# Patient Record
Sex: FEMALE | Race: WHITE | NOT HISPANIC OR LATINO | Employment: FULL TIME | URBAN - NONMETROPOLITAN AREA
[De-identification: names, ages, dates, MRNs, and addresses within clinical notes are randomized per-mention and may not be internally consistent; named-entity substitution may affect disease eponyms.]

---

## 2017-04-21 DIAGNOSIS — K76.0 FATTY (CHANGE OF) LIVER, NOT ELSEWHERE CLASSIFIED: ICD-10-CM

## 2017-05-05 ENCOUNTER — TRANSCRIBE ORDERS (OUTPATIENT)
Dept: LAB | Facility: MEDICAL CENTER | Age: 22
End: 2017-05-05

## 2017-05-05 ENCOUNTER — APPOINTMENT (OUTPATIENT)
Dept: LAB | Facility: MEDICAL CENTER | Age: 22
End: 2017-05-05
Payer: COMMERCIAL

## 2017-05-05 ENCOUNTER — TRANSCRIBE ORDERS (OUTPATIENT)
Dept: ADMINISTRATIVE | Facility: HOSPITAL | Age: 22
End: 2017-05-05

## 2017-05-05 DIAGNOSIS — M25.562 LEFT KNEE PAIN, UNSPECIFIED CHRONICITY: Primary | ICD-10-CM

## 2017-05-05 DIAGNOSIS — K76.0 FATTY (CHANGE OF) LIVER, NOT ELSEWHERE CLASSIFIED: ICD-10-CM

## 2017-05-05 LAB
ALBUMIN SERPL BCP-MCNC: 3.6 G/DL (ref 3.5–5)
ALP SERPL-CCNC: 68 U/L (ref 46–116)
ALT SERPL W P-5'-P-CCNC: 24 U/L (ref 12–78)
ANION GAP SERPL CALCULATED.3IONS-SCNC: 8 MMOL/L (ref 4–13)
AST SERPL W P-5'-P-CCNC: 14 U/L (ref 5–45)
BILIRUB SERPL-MCNC: 0.39 MG/DL (ref 0.2–1)
BUN SERPL-MCNC: 10 MG/DL (ref 5–25)
CALCIUM SERPL-MCNC: 9.4 MG/DL (ref 8.3–10.1)
CHLORIDE SERPL-SCNC: 107 MMOL/L (ref 100–108)
CO2 SERPL-SCNC: 28 MMOL/L (ref 21–32)
CREAT SERPL-MCNC: 0.75 MG/DL (ref 0.6–1.3)
GFR SERPL CREATININE-BSD FRML MDRD: >60 ML/MIN/1.73SQ M
GLUCOSE P FAST SERPL-MCNC: 68 MG/DL (ref 65–99)
POTASSIUM SERPL-SCNC: 3.8 MMOL/L (ref 3.5–5.3)
PROT SERPL-MCNC: 8 G/DL (ref 6.4–8.2)
SODIUM SERPL-SCNC: 143 MMOL/L (ref 136–145)

## 2017-05-05 PROCEDURE — 36415 COLL VENOUS BLD VENIPUNCTURE: CPT

## 2017-05-05 PROCEDURE — 80053 COMPREHEN METABOLIC PANEL: CPT

## 2017-05-12 ENCOUNTER — HOSPITAL ENCOUNTER (OUTPATIENT)
Dept: MRI IMAGING | Facility: HOSPITAL | Age: 22
Discharge: HOME | End: 2017-05-12
Payer: COMMERCIAL

## 2017-05-12 DIAGNOSIS — M25.562 LEFT KNEE PAIN, UNSPECIFIED CHRONICITY: ICD-10-CM

## 2017-05-12 PROCEDURE — 73721 MRI JNT OF LWR EXTRE W/O DYE: CPT

## 2017-05-30 ENCOUNTER — OFFICE VISIT (OUTPATIENT)
Dept: URGENT CARE | Facility: CLINIC | Age: 22
End: 2017-05-30
Payer: COMMERCIAL

## 2017-05-30 PROCEDURE — 99214 OFFICE O/P EST MOD 30 MIN: CPT

## 2017-07-05 ENCOUNTER — APPOINTMENT (OUTPATIENT)
Dept: FAMILY MEDICINE CLINIC | Facility: CLINIC | Age: 22
End: 2017-07-05
Payer: COMMERCIAL

## 2017-07-05 PROCEDURE — 86580 TB INTRADERMAL TEST: CPT | Performed by: FAMILY MEDICINE

## 2017-07-19 ENCOUNTER — ALLSCRIPTS OFFICE VISIT (OUTPATIENT)
Dept: FAMILY MEDICINE CLINIC | Facility: CLINIC | Age: 22
End: 2017-07-19
Payer: COMMERCIAL

## 2017-07-19 DIAGNOSIS — R16.0 HEPATOMEGALY, NOT ELSEWHERE CLASSIFIED: ICD-10-CM

## 2017-07-19 PROCEDURE — 99213 OFFICE O/P EST LOW 20 MIN: CPT | Performed by: FAMILY MEDICINE

## 2017-07-21 ENCOUNTER — APPOINTMENT (OUTPATIENT)
Dept: LAB | Facility: MEDICAL CENTER | Age: 22
End: 2017-07-21
Payer: COMMERCIAL

## 2017-07-21 ENCOUNTER — TRANSCRIBE ORDERS (OUTPATIENT)
Dept: LAB | Facility: MEDICAL CENTER | Age: 22
End: 2017-07-21

## 2017-07-21 DIAGNOSIS — R16.0 HEPATOMEGALY, NOT ELSEWHERE CLASSIFIED: ICD-10-CM

## 2017-07-21 LAB
ALBUMIN SERPL BCP-MCNC: 3.7 G/DL (ref 3.5–5)
ALP SERPL-CCNC: 62 U/L (ref 46–116)
ALT SERPL W P-5'-P-CCNC: 23 U/L (ref 12–78)
ANION GAP SERPL CALCULATED.3IONS-SCNC: 8 MMOL/L (ref 4–13)
AST SERPL W P-5'-P-CCNC: 16 U/L (ref 5–45)
BILIRUB SERPL-MCNC: 0.34 MG/DL (ref 0.2–1)
BUN SERPL-MCNC: 7 MG/DL (ref 5–25)
CALCIUM SERPL-MCNC: 9.3 MG/DL (ref 8.3–10.1)
CHLORIDE SERPL-SCNC: 104 MMOL/L (ref 100–108)
CO2 SERPL-SCNC: 26 MMOL/L (ref 21–32)
CREAT SERPL-MCNC: 0.65 MG/DL (ref 0.6–1.3)
GFR SERPL CREATININE-BSD FRML MDRD: >60 ML/MIN/1.73SQ M
GLUCOSE SERPL-MCNC: 71 MG/DL (ref 65–140)
POTASSIUM SERPL-SCNC: 3.7 MMOL/L (ref 3.5–5.3)
PROT SERPL-MCNC: 7.8 G/DL (ref 6.4–8.2)
SODIUM SERPL-SCNC: 138 MMOL/L (ref 136–145)

## 2017-07-21 PROCEDURE — 36415 COLL VENOUS BLD VENIPUNCTURE: CPT

## 2017-07-21 PROCEDURE — 80053 COMPREHEN METABOLIC PANEL: CPT

## 2017-11-09 ENCOUNTER — GENERIC CONVERSION - ENCOUNTER (OUTPATIENT)
Dept: OTHER | Facility: OTHER | Age: 22
End: 2017-11-09

## 2017-11-09 DIAGNOSIS — E16.2 HYPOGLYCEMIA: ICD-10-CM

## 2018-01-10 NOTE — PROGRESS NOTES
Chief Complaint  pt is here today for a a PPD, TdaP, and BLOOD WORK  Review of Systems    Constitutional: negative  ENT: negative  Cardiovascular: negative  Respiratory: negative  Gastrointestinal: negative  Genitourinary: negative  Musculoskeletal: negative  Integumentary and Breasts: negative  Neurological: negative  Current Meds   1  Cetirizine HCl - 10 MG Oral Tablet; take 1 tablet by mouth daily; Therapy: 42CVF8953 to (Evaluate:53Gpj0182)  Requested for: 13Jun2016; Last   Rx:13Jun2016 Ordered    Allergies    1  No Known Drug Allergies    Plan   Health Maintenance    · PPD   · Tdap (Boostrix)    (LC) Hepatitis B Surf Ab Quant; Status:Active - Retrospective By Protocol Authorization; Requested for:48Vtn8895;   Perform:[facility]; Due:61Bth8727; Last Updated By:Geovanny Valle; 7/18/2016 11:54:19 AM;Ordered;    For:Health Maintenance; Ordered By:Naomi Carson;   (1) VARICELLA ZOSTER IMMUNITY(IGG); Status:Active - Retrospective By Protocol Authorization; Requested for:89Cal5688;   Perform:[facility]; Due:25Olo9344; Last Updated [facility]; 7/18/2016 11:54:20 AM;Ordered;    For:Health Maintenance; Ordered By:Naomi Carson;   (1) RUBELLA IMMUNITY; Status:Active - Retrospective By Protocol Authorization; Requested for:96Pom4093;   Perform:[facility]; Due:68Rxu3286; Last Updated [facility]; 7/18/2016 11:54:21 AM;Ordered;    For:Health Maintenance; Ordered By:Naomi Carson;    Health Maintenance (V70 0) (Z00 00)          Signatures   Electronically signed by : Stacy Slade, [facility]; Jul 18 2016  2:11PM EST                       (Author)    Electronically signed by : Zia Villegas MD; Jan 12 2017  1:05PM EST                       (Author)

## 2018-01-13 VITALS — SYSTOLIC BLOOD PRESSURE: 110 MMHG | WEIGHT: 150 LBS | DIASTOLIC BLOOD PRESSURE: 70 MMHG | HEART RATE: 62 BPM

## 2018-01-16 NOTE — PROGRESS NOTES
Chief Complaint  Patient is here for Hep B vaccine  Active Problems    1  Low iron (280 9) (E61 1)   2  Screening for condition (V82 9) (Z13 9)    Current Meds   1  Cetirizine HCl - 10 MG Oral Tablet; take 1 tablet by mouth daily; Therapy: 82IDY7485 to (Evaluate:55Jav1043)  Requested for: 13Jun2016; Last   Rx:13Jun2016 Ordered    Allergies    1  No Known Drug Allergies    Plan  Health Maintenance    · Hepatitis B; INJECT 0 5  ML Intramuscular; To Be Done: 30TKY1595    Signatures   Electronically signed by : LISETTE Carrasco;  Aug  8 2016  8:32AM EST                       (Author)    Electronically signed by : Jocelyn Pena MD; Jan 12 2017  1:05PM EST                       (Author)

## 2018-01-17 NOTE — RESULT NOTES
Verified Results  (1) HEP B SURFACE ANTIBODY 73XRU1374 02:29PM Doyle Dov     Test Name Result Flag Reference   HEPATITIS B SURFACE ANTIBODY 143 30 mIU/mL     Protective Immunity: Hep B Surface Antibody >= 10 mIu/ml (Traceable to [facility] Reference Preparation)

## 2018-01-17 NOTE — PROGRESS NOTES
Chief Complaint  Patient is here for her 2nd ppd and a hemoglobin  Active Problems    1  Screening for condition (V82 9) (Z13 9)    Current Meds   1  Cetirizine HCl - 10 MG Oral Tablet; take 1 tablet by mouth daily; Therapy: 98NNE1546 to (Evaluate:72Wlm6670)  Requested for: 13Jun2016; Last   Rx:13Jun2016 Ordered    Allergies    1  No Known Drug Allergies    Assessment    1   Low iron (280 9) (E61 1)    Plan   PPD; INJECT 0 1  ML Intradermal; Dose: 0 1; Route: Intradermal; Site: Right Forearm; Done: 96VOX9602 08:06AM; Status: Complete - Retrospective By Protocol Authorization;  Ordered  For: Health Maintenance; Ordered By:Jordon Carson; Effective Date:25Jul2016; Administered by: Keturah Nolen: 7/25/2016 8:06:00 AM; Last Updated By: Keturah Nolen; 8/3/2016 3:08:37 PM     Signatures   Electronically signed by : Dorota Gonzalez, [facility]; Jul 25 2016  9:19AM EST                       (Author)    Electronically signed by : Kenisha Hopkins MD; Sep 13 2016  8:15PM EST                       (Author)

## 2018-03-19 ENCOUNTER — OFFICE VISIT (OUTPATIENT)
Dept: URGENT CARE | Facility: CLINIC | Age: 23
End: 2018-03-19
Payer: COMMERCIAL

## 2018-03-19 VITALS
OXYGEN SATURATION: 99 % | TEMPERATURE: 97.5 F | DIASTOLIC BLOOD PRESSURE: 86 MMHG | HEART RATE: 70 BPM | SYSTOLIC BLOOD PRESSURE: 129 MMHG

## 2018-03-19 DIAGNOSIS — J30.9 CHRONIC ALLERGIC RHINITIS, UNSPECIFIED SEASONALITY, UNSPECIFIED TRIGGER: Primary | ICD-10-CM

## 2018-03-19 PROCEDURE — 99213 OFFICE O/P EST LOW 20 MIN: CPT | Performed by: NURSE PRACTITIONER

## 2018-03-19 RX ORDER — FLUTICASONE PROPIONATE 50 MCG
2 SPRAY, SUSPENSION (ML) NASAL DAILY
Qty: 16 G | Refills: 0 | Status: SHIPPED | OUTPATIENT
Start: 2018-03-19 | End: 2018-12-04

## 2018-03-19 RX ORDER — PREDNISONE 20 MG/1
TABLET ORAL
Qty: 15 TABLET | Refills: 0 | Status: SHIPPED | OUTPATIENT
Start: 2018-03-19 | End: 2018-05-24 | Stop reason: ALTCHOICE

## 2018-03-19 NOTE — PATIENT INSTRUCTIONS
Discussed viral vs allergic vs bacterial infection  RX medication as directed  Continue OTC cough/cold medications as directed, take allergy medication daily  Call or return for problems/concerns

## 2018-03-19 NOTE — PROGRESS NOTES
[address] [facility]        NAME: Eduard To is a 25 y o  female  : 1995    MRN: [id number]  DATE: 2018  TIME: 9:11 AM    Assessment and Plan   Chronic allergic rhinitis, unspecified seasonality, unspecified trigger [J30 9]  1  Chronic allergic rhinitis, unspecified seasonality, unspecified trigger  predniSONE 20 mg tablet    fluticasone (FLONASE) 50 mcg/act nasal spray         Patient Instructions     Patient Instructions   Discussed viral vs allergic vs bacterial infection  RX medication as directed  Continue OTC cough/cold medications as directed, take allergy medication daily  Call or return for problems/concerns        Follow up with PCP in 3-5 days  Proceed to  ER if symptoms worsen  Chief Complaint     Chief Complaint   Patient presents with    Earache     and sinus congestion x 3 days         History of Present Illness       3 days of sore throat, sinus Ha, congestion  Has allergies, has not been on her allergy medication  No fevers      Earache    Associated symptoms include coughing, headaches, rhinorrhea and a sore throat  Pertinent negatives include no diarrhea, rash or vomiting  Review of Systems   Review of Systems   Constitutional: Negative for activity change, chills, fatigue and fever  HENT: Positive for congestion, ear pain, postnasal drip, rhinorrhea, sinus pressure and sore throat  Eyes: Negative for pain, discharge and redness  Respiratory: Positive for cough  Negative for wheezing  Cardiovascular: Negative for chest pain  Gastrointestinal: Negative for constipation, diarrhea, nausea and vomiting  Musculoskeletal: Negative for myalgias  Skin: Negative for rash  Neurological: Positive for headaches  Negative for dizziness           Current Medications       Current Outpatient Prescriptions:     fluticasone (FLONASE) 50 mcg/act nasal spray, 2 sprays into each nostril daily, Disp: 16 g, Rfl: 0    predniSONE 20 mg tablet, 20mg PO BID x 5 days then 20mg PO daily x 5 days, Disp: 15 tablet, Rfl: 0    Current Allergies     Allergies as of 03/19/2018 - Reviewed 03/19/2018   Allergen Reaction Noted    Levofloxacin Hives 11/23/2016    Sulfa antibiotics Hives 11/23/2016            The following portions of the patient's history were reviewed and updated as appropriate: allergies, current medications, past family history, past medical history, past social history, past surgical history and problem list      No past medical history on file  No past surgical history on file  No family history on file  Medications have been verified  Objective   /86   Pulse 70   Temp 97 5 °F (36 4 °C)   SpO2 99%        Physical Exam     Physical Exam   Constitutional: She is oriented to person, place, and time  She appears well-developed and well-nourished  No distress  HENT:   Head: Normocephalic and atraumatic  Right Ear: Tympanic membrane, external ear and ear canal normal    Left Ear: Tympanic membrane, external ear and ear canal normal    Nose: Rhinorrhea present  No epistaxis  Right sinus exhibits no maxillary sinus tenderness and no frontal sinus tenderness  Left sinus exhibits no maxillary sinus tenderness and no frontal sinus tenderness  Mouth/Throat: Uvula is midline, oropharynx is clear and moist and mucous membranes are normal    Cardiovascular: Normal rate, regular rhythm and normal heart sounds  Exam reveals no gallop and no friction rub  No murmur heard  Pulmonary/Chest: Effort normal and breath sounds normal  No respiratory distress  She has no wheezes  She has no rales  Abdominal: She exhibits no distension  Musculoskeletal: Normal range of motion  Neurological: She is alert and oriented to person, place, and time  Skin: She is not diaphoretic  Psychiatric: She has a normal mood and affect  Her behavior is normal  Judgment and thought content normal    Nursing note and vitals reviewed

## 2018-05-24 ENCOUNTER — OFFICE VISIT (OUTPATIENT)
Dept: URGENT CARE | Facility: CLINIC | Age: 23
End: 2018-05-24
Payer: COMMERCIAL

## 2018-05-24 VITALS
WEIGHT: 178 LBS | HEIGHT: 67 IN | SYSTOLIC BLOOD PRESSURE: 121 MMHG | RESPIRATION RATE: 18 BRPM | TEMPERATURE: 98.9 F | DIASTOLIC BLOOD PRESSURE: 76 MMHG | HEART RATE: 80 BPM | OXYGEN SATURATION: 99 % | BODY MASS INDEX: 27.94 KG/M2

## 2018-05-24 DIAGNOSIS — J01.00 ACUTE MAXILLARY SINUSITIS, RECURRENCE NOT SPECIFIED: Primary | ICD-10-CM

## 2018-05-24 PROCEDURE — G0382 LEV 3 HOSP TYPE B ED VISIT: HCPCS | Performed by: PHYSICIAN ASSISTANT

## 2018-05-24 PROCEDURE — 99283 EMERGENCY DEPT VISIT LOW MDM: CPT | Performed by: PHYSICIAN ASSISTANT

## 2018-05-24 RX ORDER — AMOXICILLIN AND CLAVULANATE POTASSIUM 875; 125 MG/1; MG/1
1 TABLET, FILM COATED ORAL EVERY 12 HOURS SCHEDULED
Qty: 20 TABLET | Refills: 0 | Status: SHIPPED | OUTPATIENT
Start: 2018-05-24 | End: 2018-06-03

## 2018-05-24 NOTE — PROGRESS NOTES
[address]  (office) [phone]  (fax) [phone]        NAME: Yvetta Hashimoto is a 25 y o  female  : 1995    MRN: [id number]  DATE: May 24, 2018  TIME: 6:07 PM    Assessment and Plan   Acute maxillary sinusitis, recurrence not specified [J01 00]  1  Acute maxillary sinusitis, recurrence not specified  amoxicillin-clavulanate (AUGMENTIN) 875-125 mg per tablet       Patient Instructions   I have prescribed an antibiotic for the infection  Please take the antibiotic as prescribed and finish the entire prescription  I recommend that the patient takes an over the counter probiotic or eats yogurt with live cultures in it [place]) to keep good bacteria in the gut and help prevent diarrhea  Wash hands frequently to prevent the spread of infection  Can use over the counter cough and cold medications to help with symptoms  Ibuprofen and/or tylenol as needed for pain or fever  If not improving over the next 3-5 days, follow up with PCP  To present to the ER if symptoms worsen  Chief Complaint     Chief Complaint   Patient presents with    Sore Throat     x 5 days  Achilles Fu LPN    Earache     Bilateral ear pain    Cold Like Symptoms         History of Present Illness   Yvetta Hashimoto presents to the clinic c/o    Sore Throat    This is a new problem  The current episode started in the past 7 days  The problem has been gradually worsening  Neither side of throat is experiencing more pain than the other  There has been no fever  The pain is moderate  Associated symptoms include ear pain  Pertinent negatives include no abdominal pain, congestion, coughing, diarrhea, ear discharge, headaches, neck pain, shortness of breath, swollen glands, trouble swallowing or vomiting  She has tried nothing for the symptoms  The treatment provided no relief  Earache    There is pain in both ears  This is a new problem   The current episode started in the past 7 days  The problem occurs constantly  The problem has been unchanged  There has been no fever  Pertinent negatives include no abdominal pain, coughing, diarrhea, ear discharge, headaches, neck pain, rash, rhinorrhea, sore throat or vomiting  She has tried nothing for the symptoms  The treatment provided no relief  Review of Systems   Review of Systems   Constitutional: Negative for activity change, appetite change, chills, diaphoresis, fatigue and fever  HENT: Positive for ear pain, sinus pain and sinus pressure  Negative for congestion, ear discharge, facial swelling, rhinorrhea, sneezing, sore throat and trouble swallowing  Eyes: Negative for photophobia, pain, discharge, redness, itching and visual disturbance  Respiratory: Negative for apnea, cough, chest tightness, shortness of breath and wheezing  Cardiovascular: Negative for chest pain  Gastrointestinal: Negative for abdominal distention, abdominal pain, anal bleeding, blood in stool, constipation, diarrhea, nausea and vomiting  Genitourinary: Negative for dysuria, flank pain, frequency, hematuria and urgency  Musculoskeletal: Negative for arthralgias, back pain, gait problem, joint swelling, myalgias, neck pain and neck stiffness  Skin: Negative for color change, rash and wound  Allergic/Immunologic: Negative for immunocompromised state  Neurological: Negative for dizziness, facial asymmetry and headaches  Hematological: Negative for adenopathy  Psychiatric/Behavioral: Negative for confusion and decreased concentration           Current Medications     Long-Term Prescriptions   Medication Sig Dispense Refill    fluticasone (FLONASE) 50 mcg/act nasal spray 2 sprays into each nostril daily 16 g 0       Current Allergies     Allergies as of 05/24/2018 - Reviewed 05/24/2018   Allergen Reaction Noted    Levofloxacin Hives 11/23/2016    Sulfa antibiotics Hives 11/23/2016            The following portions of the patient's history were reviewed and updated as appropriate: allergies, current medications, past family history, past medical history, past social history, past surgical history and problem list   No past medical history on file  No past surgical history on file  Social History     Social History    Marital status: Single     Spouse name: N/A    Number of children: N/A    Years of education: N/A     Occupational History    Not on file  Social History Main Topics    Smoking status: Never Smoker    Smokeless tobacco: Never Used    Alcohol use Yes      Comment: socially    Drug use: No    Sexual activity: Not on file     Other Topics Concern    Not on file     Social History Narrative    No narrative on file       Objective   /76 (BP Location: Right arm, Patient Position: Sitting, Cuff Size: Standard)   Pulse 80   Temp 98 9 °F (37 2 °C) (Tympanic)   Resp 18   Ht 5' 7" (1 702 m)   Wt 80 7 kg (178 lb)   SpO2 99%   BMI 27 88 kg/m²      Physical Exam     Physical Exam   Constitutional: She is oriented to person, place, and time  She appears well-developed and well-nourished  No distress  HENT:   Head: Normocephalic and atraumatic  Right Ear: Tympanic membrane and external ear normal    Left Ear: Tympanic membrane and external ear normal    Nose: Mucosal edema present  Right sinus exhibits maxillary sinus tenderness  Right sinus exhibits no frontal sinus tenderness  Left sinus exhibits maxillary sinus tenderness  Left sinus exhibits no frontal sinus tenderness  Mouth/Throat: No oropharyngeal exudate or posterior oropharyngeal erythema  Eyes: Conjunctivae and EOM are normal  Pupils are equal, round, and reactive to light  Right eye exhibits no discharge  Left eye exhibits no discharge  No scleral icterus  Neck: Normal range of motion  Neck supple  No JVD present  No tracheal deviation present  No thyromegaly present  Cardiovascular: Normal rate, regular rhythm and normal heart sounds    Exam reveals no gallop and no friction rub  No murmur heard  Pulmonary/Chest: Effort normal and breath sounds normal  No stridor  No respiratory distress  She has no wheezes  She has no rales  She exhibits no tenderness  Abdominal: Soft  Bowel sounds are normal  She exhibits no distension and no mass  There is no tenderness  There is no rebound and no guarding  Musculoskeletal: Normal range of motion  She exhibits no tenderness or deformity  Lymphadenopathy:     She has no cervical adenopathy  Neurological: She is alert and oriented to person, place, and time  She has normal reflexes  Coordination normal    Skin: Skin is warm and dry  No rash noted  She is not diaphoretic  No erythema  No pallor  Psychiatric: She has a normal mood and affect  Her behavior is normal  Judgment and thought content normal    Nursing note and vitals reviewed        Fernando Mathur PA-C

## 2018-08-10 ENCOUNTER — VBI (OUTPATIENT)
Dept: ADMINISTRATIVE | Facility: OTHER | Age: 23
End: 2018-08-10

## 2018-11-06 ENCOUNTER — OFFICE VISIT (OUTPATIENT)
Dept: URGENT CARE | Facility: CLINIC | Age: 23
End: 2018-11-06
Payer: COMMERCIAL

## 2018-11-06 VITALS
HEIGHT: 67 IN | HEART RATE: 92 BPM | WEIGHT: 178 LBS | TEMPERATURE: 97 F | DIASTOLIC BLOOD PRESSURE: 60 MMHG | OXYGEN SATURATION: 99 % | RESPIRATION RATE: 20 BRPM | SYSTOLIC BLOOD PRESSURE: 108 MMHG | BODY MASS INDEX: 27.94 KG/M2

## 2018-11-06 DIAGNOSIS — J01.40 ACUTE NON-RECURRENT PANSINUSITIS: Primary | ICD-10-CM

## 2018-11-06 PROCEDURE — G0382 LEV 3 HOSP TYPE B ED VISIT: HCPCS | Performed by: FAMILY MEDICINE

## 2018-11-06 PROCEDURE — 99283 EMERGENCY DEPT VISIT LOW MDM: CPT | Performed by: FAMILY MEDICINE

## 2018-11-06 RX ORDER — DOXYCYCLINE 100 MG/1
100 TABLET ORAL 2 TIMES DAILY
Qty: 20 TABLET | Refills: 0 | Status: SHIPPED | OUTPATIENT
Start: 2018-11-06 | End: 2018-11-16

## 2018-11-06 RX ORDER — AMOXICILLIN AND CLAVULANATE POTASSIUM 875; 125 MG/1; MG/1
1 TABLET, FILM COATED ORAL EVERY 12 HOURS SCHEDULED
COMMUNITY
End: 2018-12-04

## 2018-11-06 NOTE — LETTER
November 6, 2018     Patient: Jacob Wong   YOB: 1995   Date of Visit: 11/6/2018       To Whom it May Concern:    Jacob Wong was seen in my clinic on 11/6/2018  If you have any questions or concerns, please don't hesitate to call           Sincerely,          Sarina aMguire DO        CC: No Recipients

## 2018-11-07 NOTE — PATIENT INSTRUCTIONS
Plain Mucinex during the day and Mucinex p m  during the evening  Follow up with PCP in 3-5 days  Proceed to  ER if symptoms worsen  Sinusitis   AMBULATORY CARE:   Sinusitis  is inflammation or infection of your sinuses  It is most often caused by a virus  Acute sinusitis may last up to 12 weeks  Chronic sinusitis lasts longer than 12 weeks  Recurrent sinusitis means you have 4 or more times in 1 year  Common symptoms include the following:   · Fever    · Pain, pressure, redness, or swelling around the forehead, cheeks, or eyes    · Thick yellow or green discharge from your nose    · Tenderness when you touch your face over your sinuses    · Dry cough that happens mostly at night or when you lie down    · Headache and face pain that is worse when you lean forward    · Tooth pain, or pain when you chew  Seek care immediately if:   · Your eye and eyelid are red, swollen, and painful  · You cannot open your eye  · You have vision changes, such as double vision  · Your eyeball bulges out or you cannot move your eye  · You are more sleepy than normal, or you notice changes in your ability to think, move, or talk  · You have a stiff neck, a fever, or a bad headache  · You have swelling of your forehead or scalp  Contact your healthcare provider if:   · Your symptoms do not improve after 3 days  · Your symptoms do not go away after 10 days  · You have nausea and are vomiting  · Your nose is bleeding  · You have questions or concerns about your condition or care  Treatment for sinusitis:  Your symptoms may go away on their own  Your healthcare provider may recommend watchful waiting for up to 10 days before starting antibiotics  You may  need any of the following:  · Acetaminophen  decreases pain and fever  It is available without a doctor's order  Ask how much to take and how often to take it  Follow directions   Read the labels of all other medicines you are using to see if they also contain acetaminophen, or ask your doctor or pharmacist  Acetaminophen can cause liver damage if not taken correctly  Do not use more than 4 grams (4,000 milligrams) total of acetaminophen in one day  · NSAIDs , such as ibuprofen, help decrease swelling, pain, and fever  This medicine is available with or without a doctor's order  NSAIDs can cause stomach bleeding or kidney problems in certain people  If you take blood thinner medicine, always ask your healthcare provider if NSAIDs are safe for you  Always read the medicine label and follow directions  · Nasal steroid sprays  may help decrease inflammation in your nose and sinuses  · Decongestants  help reduce swelling and drain mucus in the nose and sinuses  They may help you breathe easier  · Antihistamines  help dry mucus in the nose and relieve sneezing  · Antibiotics  help treat or prevent a bacterial infection  · Take your medicine as directed  Contact your healthcare provider if you think your medicine is not helping or if you have side effects  Tell him or her if you are allergic to any medicine  Keep a list of the medicines, vitamins, and herbs you take  Include the amounts, and when and why you take them  Bring the list or the pill bottles to follow-up visits  Carry your medicine list with you in case of an emergency  Self-care:   · Rinse your sinuses  Use a sinus rinse device to rinse your nasal passages with a saline (salt water) solution or distilled water  Do not use tap water  This will help thin the mucus in your nose and rinse away pollen and dirt  It will also help reduce swelling so you can breathe normally  Ask your healthcare provider how often to do this  · Breathe in steam   Heat a bowl of water until you see steam  Lean over the bowl and make a tent over your head with a large towel  Breathe deeply for about 20 minutes  Be careful not to get too close to the steam or burn yourself  Do this 3 times a day   You can also breathe deeply when you take a hot shower  · Sleep with your head elevated  Place an extra pillow under your head before you go to sleep to help your sinuses drain  · Drink liquids as directed  Ask your healthcare provider how much liquid to drink each day and which liquids are best for you  Liquids will thin the mucus in your nose and help it drain  Avoid drinks that contain alcohol or caffeine  · Do not smoke, and avoid secondhand smoke  Nicotine and other chemicals in cigarettes and cigars can make your symptoms worse  Ask your healthcare provider for information if you currently smoke and need help to quit  E-cigarettes or smokeless tobacco still contain nicotine  Talk to your healthcare provider before you use these products  Prevent the spread of germs that cause sinusitis:  Wash your hands often with soap and water  Wash your hands after you use the bathroom, change a child's diaper, or sneeze  Wash your hands before you prepare or eat food  Follow up with your healthcare provider as directed: You may be referred to an ear, nose, and throat specialist  Write down your questions so you remember to ask them during your visits  © 2017 [facility] Information is for End User's use only and may not be sold, redistributed or otherwise used for commercial purposes  All illustrations and images included in CareNotes® are the copyrighted property of A D A M [employer] Inc  or [employer] Soriano  The above information is an  only  It is not intended as medical advice for individual conditions or treatments  Talk to your doctor, nurse or pharmacist before following any medical regimen to see if it is safe and effective for you

## 2018-11-07 NOTE — PROGRESS NOTES
[facility]        NAME: Anand Jain is a 21 y o  female  : 1995    MRN: [id number]  DATE: 2018  TIME: 8:28 PM    Assessment and Plan   Acute non-recurrent pansinusitis [J01 40]  1  Acute non-recurrent pansinusitis  doxycycline (ADOXA) 100 MG tablet         Patient Instructions     Plain Mucinex during the day and Mucinex p m  during the evening  Follow up with PCP in 3-5 days  Proceed to  ER if symptoms worsen  Chief Complaint     Chief Complaint   Patient presents with    Fever     fever,body aches,ear pain for 10 days  Presently on Augmentin for sinusitis 6 days with minimal improvement         History of Present Illness       Fever (fever,body aches,ear pain for 10 days  Presently on Augmentin for sinusitis 6 days with minimal improvement)      Fever   Associated symptoms include chills, congestion and fatigue  Pertinent negatives include no fever  Review of Systems   Review of Systems   Constitutional: Positive for chills and fatigue  Negative for fever  HENT: Positive for congestion, postnasal drip and sinus pain  Respiratory: Negative  Cardiovascular: Negative            Current Medications       Current Outpatient Prescriptions:     amoxicillin-clavulanate (AUGMENTIN) 875-125 mg per tablet, Take 1 tablet by mouth every 12 (twelve) hours, Disp: , Rfl:     doxycycline (ADOXA) 100 MG tablet, Take 1 tablet (100 mg total) by mouth 2 (two) times a day for 10 days, Disp: 20 tablet, Rfl: 0    fluticasone (FLONASE) 50 mcg/act nasal spray, 2 sprays into each nostril daily (Patient not taking: Reported on 2018 ), Disp: 16 g, Rfl: 0    Current Allergies     Allergies as of 2018 - Reviewed 2018   Allergen Reaction Noted    Levofloxacin Hives 2016    Sulfa antibiotics Hives 2016            The following portions of the patient's history were reviewed and updated as appropriate: allergies, current medications, past family history, past medical history, past social history, past surgical history and problem list      History reviewed  No pertinent past medical history  History reviewed  No pertinent surgical history  History reviewed  No pertinent family history  Medications have been verified  Objective   /60   Pulse 92   Temp (!) 97 °F (36 1 °C) (Tympanic)   Resp 20   Ht 5' 7" (1 702 m)   Wt 80 7 kg (178 lb)   LMP 10/22/2018   SpO2 99%   BMI 27 88 kg/m²        Physical Exam     Physical Exam   Constitutional: She is oriented to person, place, and time  She appears well-developed and well-nourished  She appears ill  HENT:   Right Ear: External ear normal    Left Ear: External ear normal    Nose: Right sinus exhibits maxillary sinus tenderness and frontal sinus tenderness  Left sinus exhibits maxillary sinus tenderness and frontal sinus tenderness  Mouth/Throat: Oropharynx is clear and moist  No oropharyngeal exudate  Eyes: Conjunctivae are normal    Neck: Normal range of motion  Neck supple  Cardiovascular: Normal rate, regular rhythm and normal heart sounds  No murmur heard  Pulmonary/Chest: Effort normal and breath sounds normal  No respiratory distress  She has no wheezes  She has no rales  She exhibits no tenderness  Abdominal: Soft  Musculoskeletal: Normal range of motion  Lymphadenopathy:     She has no cervical adenopathy  Neurological: She is alert and oriented to person, place, and time  Skin: Skin is warm  No rash noted  No erythema

## 2018-12-04 ENCOUNTER — TELEPHONE (OUTPATIENT)
Dept: INTERNAL MEDICINE CLINIC | Facility: CLINIC | Age: 23
End: 2018-12-04

## 2018-12-04 ENCOUNTER — OFFICE VISIT (OUTPATIENT)
Dept: INTERNAL MEDICINE CLINIC | Facility: CLINIC | Age: 23
End: 2018-12-04
Payer: COMMERCIAL

## 2018-12-04 VITALS
OXYGEN SATURATION: 97 % | WEIGHT: 183.4 LBS | DIASTOLIC BLOOD PRESSURE: 74 MMHG | BODY MASS INDEX: 28.79 KG/M2 | TEMPERATURE: 98.4 F | HEIGHT: 67 IN | HEART RATE: 75 BPM | SYSTOLIC BLOOD PRESSURE: 118 MMHG | RESPIRATION RATE: 18 BRPM

## 2018-12-04 DIAGNOSIS — E55.9 VITAMIN D DEFICIENCY: ICD-10-CM

## 2018-12-04 DIAGNOSIS — Z13.1 SCREENING FOR DIABETES MELLITUS: ICD-10-CM

## 2018-12-04 DIAGNOSIS — Z13.0 SCREENING, ANEMIA, DEFICIENCY, IRON: ICD-10-CM

## 2018-12-04 DIAGNOSIS — Z13.29 SCREENING FOR HYPOTHYROIDISM: ICD-10-CM

## 2018-12-04 DIAGNOSIS — Z13.220 SCREENING FOR HYPERLIPIDEMIA: ICD-10-CM

## 2018-12-04 DIAGNOSIS — J30.2 SEASONAL ALLERGIES: ICD-10-CM

## 2018-12-04 DIAGNOSIS — K76.9 LESION OF LIVER: Primary | ICD-10-CM

## 2018-12-04 DIAGNOSIS — R16.0 LIVER MASS: Primary | ICD-10-CM

## 2018-12-04 PROCEDURE — 3008F BODY MASS INDEX DOCD: CPT | Performed by: PHYSICIAN ASSISTANT

## 2018-12-04 PROCEDURE — 99214 OFFICE O/P EST MOD 30 MIN: CPT | Performed by: PHYSICIAN ASSISTANT

## 2018-12-04 RX ORDER — MOMETASONE FUROATE 50 UG/1
2 SPRAY, METERED NASAL DAILY
Qty: 17 G | Refills: 0 | Status: SHIPPED | OUTPATIENT
Start: 2018-12-04 | End: 2018-12-04 | Stop reason: SDUPTHER

## 2018-12-04 RX ORDER — MOMETASONE FUROATE 50 UG/1
2 SPRAY, METERED NASAL DAILY
Qty: 17 G | Refills: 0 | Status: SHIPPED | OUTPATIENT
Start: 2018-12-04

## 2018-12-04 RX ORDER — COPPER 313.4 MG/1
1 INTRAUTERINE DEVICE INTRAUTERINE ONCE
COMMUNITY
End: 2020-02-08

## 2018-12-04 NOTE — TELEPHONE ENCOUNTER
Reviewed patients ultrasound and MRI abdomen  I would recommend repeat MRI abdomen with Eovist contrast to get a better image and see if the mass has grown since 2017  I will put in the order

## 2018-12-04 NOTE — PROGRESS NOTES
Assessment/Plan:    Seasonal allergies  Start nasonex for allergy symptoms  Liver mass  According to pt this is likely FNP  Will get records of ultrasound and MRI and tailor treatment plan once reviewed  Will update routine labs  Diagnoses and all orders for this visit:    Screening, anemia, deficiency, iron  -     CBC and differential    Screening for diabetes mellitus  -     Comprehensive metabolic panel    Screening for hyperlipidemia  -     Lipid Panel with Direct LDL reflex    Screening for hypothyroidism  -     TSH, 3rd generation with Free T4 reflex    Vitamin D deficiency  -     Vitamin D 25 hydroxy    Seasonal allergies  -     Discontinue: mometasone (NASONEX) 50 mcg/act nasal spray; 2 sprays into each nostril daily  -     mometasone (NASONEX) 50 mcg/act nasal spray; 2 sprays into each nostril daily    Other orders  -     intrauterine copper (PARAGARD) IUD; 1 each by Intrauterine route once          Subjective:      Patient ID: Benjaman Gowers is a 21 y o  female  Pt presents to establish care as a transfer from [facility]  PMHx includes liver mass likely FNH  PSurgHx includes lipoma removal and tonsillectomy  Allergy includes levaquin and bee stings  Medications include paraguard IUD and epipen  She denies tobacco or drug use  She works as an ultrasound technologist  She is due for labs  She had flu vaccine and gardasil  She is concerned about this liver mass  She underwent ultrasound and MRI but these records are not yet available  Will call to get a copy and then determine next step  She is also complaining of nasal congestion, runny nose, itchy watery eyes for several weeks  The following portions of the patient's history were reviewed and updated as appropriate:   She  has a past medical history of Liver mass    She   Patient Active Problem List    Diagnosis Date Noted    Seasonal allergies 12/04/2018    Liver mass 12/04/2018     She  has a past surgical history that includes Knee surgery and Tonsillectomy  Her family history includes Woods's disease in her father; Coronary artery disease in her father; Heart attack in her father; Lupus in her paternal grandfather; No Known Problems in her maternal grandmother, mother, and paternal grandmother; Testicular cancer in her father  She  reports that she has never smoked  She has never used smokeless tobacco  She reports that she drinks alcohol  She reports that she does not use drugs  Current Outpatient Prescriptions   Medication Sig Dispense Refill    intrauterine copper (PARAGARD) IUD 1 each by Intrauterine route once      mometasone (NASONEX) 50 mcg/act nasal spray 2 sprays into each nostril daily 17 g 0     No current facility-administered medications for this visit  Current Outpatient Prescriptions on File Prior to Visit   Medication Sig    [DISCONTINUED] amoxicillin-clavulanate (AUGMENTIN) 875-125 mg per tablet Take 1 tablet by mouth every 12 (twelve) hours    [DISCONTINUED] fluticasone (FLONASE) 50 mcg/act nasal spray 2 sprays into each nostril daily (Patient not taking: Reported on 11/6/2018 )     No current facility-administered medications on file prior to visit  She is allergic to levofloxacin       Review of Systems   Constitutional: Negative for chills and fever  HENT: Positive for congestion, postnasal drip and rhinorrhea  Negative for ear pain, hearing loss, sinus pain, sinus pressure, sore throat and trouble swallowing  Eyes: Positive for itching  Negative for pain and visual disturbance  Respiratory: Negative for cough, chest tightness, shortness of breath and wheezing  Cardiovascular: Negative  Negative for chest pain, palpitations and leg swelling  Gastrointestinal: Negative for abdominal pain, blood in stool, constipation, diarrhea, nausea and vomiting  Endocrine: Negative for cold intolerance, heat intolerance, polydipsia, polyphagia and polyuria     Genitourinary: Negative for difficulty urinating, dysuria, flank pain and urgency  Musculoskeletal: Negative for arthralgias, back pain, gait problem and myalgias  Skin: Negative for rash  Allergic/Immunologic: Negative  Neurological: Negative for dizziness, weakness, light-headedness and headaches  Hematological: Negative  Psychiatric/Behavioral: Negative for behavioral problems, dysphoric mood and sleep disturbance  The patient is not nervous/anxious  Objective:      /74 (BP Location: Left arm, Patient Position: Sitting, Cuff Size: Large)   Pulse 75   Temp 98 4 °F (36 9 °C)   Resp 18   Ht 5' 7" (1 702 m)   Wt 83 2 kg (183 lb 6 4 oz)   SpO2 97%   BMI 28 72 kg/m²          Physical Exam   Constitutional: She is oriented to person, place, and time  She appears well-developed and well-nourished  No distress  HENT:   Head: Normocephalic and atraumatic  Right Ear: External ear normal    Left Ear: External ear normal    Nose: Mucosal edema and rhinorrhea present  Mouth/Throat: Oropharynx is clear and moist  No oropharyngeal exudate  Eyes: Pupils are equal, round, and reactive to light  Conjunctivae and EOM are normal  Right eye exhibits no discharge  Left eye exhibits no discharge  No scleral icterus  Neck: Normal range of motion  Neck supple  No thyromegaly present  Cardiovascular: Normal rate, regular rhythm and normal heart sounds  Exam reveals no gallop and no friction rub  No murmur heard  Pulmonary/Chest: Effort normal and breath sounds normal  No respiratory distress  She has no wheezes  She has no rales  Abdominal: Soft  Bowel sounds are normal  She exhibits no distension  There is no tenderness  Musculoskeletal: Normal range of motion  She exhibits no edema, tenderness or deformity  Neurological: She is alert and oriented to person, place, and time  No cranial nerve deficit  Skin: Skin is warm and dry  She is not diaphoretic  Psychiatric: She has a normal mood and affect   Her behavior is normal  Judgment and thought content normal

## 2018-12-04 NOTE — ASSESSMENT & PLAN NOTE
According to pt this is likely FNP  Will get records of ultrasound and MRI and tailor treatment plan once reviewed  Will update routine labs

## 2018-12-14 ENCOUNTER — APPOINTMENT (OUTPATIENT)
Dept: LAB | Facility: HOSPITAL | Age: 23
End: 2018-12-14
Payer: COMMERCIAL

## 2018-12-14 ENCOUNTER — TRANSCRIBE ORDERS (OUTPATIENT)
Dept: ADMINISTRATIVE | Facility: HOSPITAL | Age: 23
End: 2018-12-14

## 2018-12-14 DIAGNOSIS — E55.9 VITAMIN D DEFICIENCY DISEASE: ICD-10-CM

## 2018-12-14 DIAGNOSIS — Z13.0 SCREENING FOR IRON DEFICIENCY ANEMIA: ICD-10-CM

## 2018-12-14 DIAGNOSIS — Z13.220 SCREENING FOR LIPOID DISORDERS: ICD-10-CM

## 2018-12-14 DIAGNOSIS — Z13.1 SCREENING FOR DIABETES MELLITUS: ICD-10-CM

## 2018-12-14 DIAGNOSIS — Z13.29 SCREENING FOR THYROID DISORDER: ICD-10-CM

## 2018-12-14 DIAGNOSIS — Z13.0 SCREENING FOR IRON DEFICIENCY ANEMIA: Primary | ICD-10-CM

## 2018-12-14 LAB
ALBUMIN SERPL BCP-MCNC: 3.8 G/DL (ref 3.5–5)
ALP SERPL-CCNC: 92 U/L (ref 46–116)
ALT SERPL W P-5'-P-CCNC: 29 U/L (ref 12–78)
ANION GAP SERPL CALCULATED.3IONS-SCNC: 8 MMOL/L (ref 4–13)
AST SERPL W P-5'-P-CCNC: 14 U/L (ref 5–45)
BILIRUB SERPL-MCNC: 0.2 MG/DL (ref 0.2–1)
BUN SERPL-MCNC: 11 MG/DL (ref 5–25)
CALCIUM SERPL-MCNC: 8.5 MG/DL (ref 8.3–10.1)
CHLORIDE SERPL-SCNC: 102 MMOL/L (ref 100–108)
CO2 SERPL-SCNC: 30 MMOL/L (ref 21–32)
CREAT SERPL-MCNC: 0.81 MG/DL (ref 0.6–1.3)
GFR SERPL CREATININE-BSD FRML MDRD: 103 ML/MIN/1.73SQ M
GLUCOSE SERPL-MCNC: 101 MG/DL (ref 65–140)
POTASSIUM SERPL-SCNC: 4 MMOL/L (ref 3.5–5.3)
PROT SERPL-MCNC: 7.5 G/DL (ref 6.4–8.2)
SODIUM SERPL-SCNC: 140 MMOL/L (ref 136–145)

## 2018-12-14 PROCEDURE — 80053 COMPREHEN METABOLIC PANEL: CPT

## 2018-12-14 PROCEDURE — 36415 COLL VENOUS BLD VENIPUNCTURE: CPT

## 2018-12-16 LAB
25(OH)D3 SERPL-MCNC: 23 NG/ML (ref 30–100)
ALBUMIN SERPL-MCNC: 4.3 G/DL (ref 3.6–5.1)
ALBUMIN/GLOB SERPL: 1.4 (CALC) (ref 1–2.5)
ALP SERPL-CCNC: 85 U/L (ref 33–115)
ALT SERPL-CCNC: 13 U/L (ref 6–29)
AST SERPL-CCNC: 13 U/L (ref 10–30)
BASOPHILS # BLD AUTO: 19 CELLS/UL (ref 0–200)
BASOPHILS NFR BLD AUTO: 0.3 %
BILIRUB SERPL-MCNC: 0.4 MG/DL (ref 0.2–1.2)
BUN SERPL-MCNC: 11 MG/DL (ref 7–25)
BUN/CREAT SERPL: NORMAL (CALC) (ref 6–22)
CALCIUM SERPL-MCNC: 9.1 MG/DL (ref 8.6–10.2)
CHLORIDE SERPL-SCNC: 104 MMOL/L (ref 98–110)
CHOLEST SERPL-MCNC: 158 MG/DL
CHOLEST/HDLC SERPL: 2.5 (CALC)
CO2 SERPL-SCNC: 27 MMOL/L (ref 20–32)
CREAT SERPL-MCNC: 0.65 MG/DL (ref 0.5–1.1)
EOSINOPHIL # BLD AUTO: 51 CELLS/UL (ref 15–500)
EOSINOPHIL NFR BLD AUTO: 0.8 %
ERYTHROCYTE [DISTWIDTH] IN BLOOD BY AUTOMATED COUNT: 13.2 % (ref 11–15)
GLOBULIN SER CALC-MCNC: 3 G/DL (CALC) (ref 1.9–3.7)
GLUCOSE SERPL-MCNC: 79 MG/DL (ref 65–99)
HCT VFR BLD AUTO: 35.4 % (ref 35–45)
HDLC SERPL-MCNC: 64 MG/DL
HGB BLD-MCNC: 11.2 G/DL (ref 11.7–15.5)
LDLC SERPL CALC-MCNC: 80 MG/DL (CALC)
LYMPHOCYTES # BLD AUTO: 1574 CELLS/UL (ref 850–3900)
LYMPHOCYTES NFR BLD AUTO: 24.6 %
MCH RBC QN AUTO: 26.3 PG (ref 27–33)
MCHC RBC AUTO-ENTMCNC: 31.6 G/DL (ref 32–36)
MCV RBC AUTO: 83.1 FL (ref 80–100)
MONOCYTES # BLD AUTO: 416 CELLS/UL (ref 200–950)
MONOCYTES NFR BLD AUTO: 6.5 %
NEUTROPHILS # BLD AUTO: 4339 CELLS/UL (ref 1500–7800)
NEUTROPHILS NFR BLD AUTO: 67.8 %
NONHDLC SERPL-MCNC: 94 MG/DL (CALC)
PLATELET # BLD AUTO: 300 THOUSAND/UL (ref 140–400)
PMV BLD REES-ECKER: 10.6 FL (ref 7.5–12.5)
POTASSIUM SERPL-SCNC: 4.4 MMOL/L (ref 3.5–5.3)
PROT SERPL-MCNC: 7.3 G/DL (ref 6.1–8.1)
RBC # BLD AUTO: 4.26 MILLION/UL (ref 3.8–5.1)
SL AMB EGFR AFRICAN AMERICAN: 145 ML/MIN/1.73M2
SL AMB EGFR NON AFRICAN AMERICAN: 125 ML/MIN/1.73M2
SODIUM SERPL-SCNC: 139 MMOL/L (ref 135–146)
TRIGL SERPL-MCNC: 64 MG/DL
TSH SERPL-ACNC: 1.91 MIU/L
WBC # BLD AUTO: 6.4 THOUSAND/UL (ref 3.8–10.8)

## 2018-12-17 ENCOUNTER — HOSPITAL ENCOUNTER (OUTPATIENT)
Dept: MRI IMAGING | Facility: HOSPITAL | Age: 23
Discharge: HOME | End: 2018-12-17
Payer: COMMERCIAL

## 2018-12-17 DIAGNOSIS — K76.9 LESION OF LIVER: ICD-10-CM

## 2018-12-17 PROCEDURE — 74183 MRI ABD W/O CNTR FLWD CNTR: CPT

## 2018-12-17 PROCEDURE — A9581 GADOXETATE DISODIUM INJ: HCPCS | Performed by: RADIOLOGY

## 2018-12-17 RX ADMIN — GADOXETATE DISODIUM 8 ML: 181.43 INJECTION, SOLUTION INTRAVENOUS at 13:31

## 2018-12-20 ENCOUNTER — OFFICE VISIT (OUTPATIENT)
Dept: URGENT CARE | Facility: CLINIC | Age: 23
End: 2018-12-20
Payer: COMMERCIAL

## 2018-12-20 VITALS
RESPIRATION RATE: 18 BRPM | OXYGEN SATURATION: 97 % | SYSTOLIC BLOOD PRESSURE: 126 MMHG | TEMPERATURE: 97.5 F | HEIGHT: 67 IN | HEART RATE: 94 BPM | WEIGHT: 180 LBS | DIASTOLIC BLOOD PRESSURE: 71 MMHG | BODY MASS INDEX: 28.25 KG/M2

## 2018-12-20 DIAGNOSIS — J01.00 ACUTE MAXILLARY SINUSITIS, RECURRENCE NOT SPECIFIED: Primary | ICD-10-CM

## 2018-12-20 PROCEDURE — 99213 OFFICE O/P EST LOW 20 MIN: CPT | Performed by: PHYSICIAN ASSISTANT

## 2018-12-20 RX ORDER — METHYLPREDNISOLONE 4 MG/1
TABLET ORAL
Qty: 21 TABLET | Refills: 0 | Status: SHIPPED | OUTPATIENT
Start: 2018-12-20 | End: 2020-02-08

## 2018-12-20 NOTE — PROGRESS NOTES
[facility] [address] [facility]  (office) [phone]  (fax) [phone]        NAME: Youlanda Koyanagi is a 21 y o  female  : 1995    MRN: [id number]  DATE: 2018  TIME: 9:08 AM    Assessment and Plan   Acute maxillary sinusitis, recurrence not specified [J01 00]  1  Acute maxillary sinusitis, recurrence not specified  methylprednisolone (MEDROL) 4 mg tablet       Patient Instructions   Infection appears viral   Recommend symptomatic treatment  Can take ibuprofen or tylenol as needed for pain or fever  Over the counter cough and cold medications to help with symptoms  Use salt water gargles for sore throat and throat lozenges  Cough drops as needed  Wash hands frequently to prevent the spread of infection  Continue nasal sprays and Sudafed  If not improving over the next 7-10 days, follow up with PCP  Symptoms may persist for 10-14 days  To present to the ER if symptoms worsen  Chief Complaint     Chief Complaint   Patient presents with    Cold Like Symptoms     nasal congestion, sinus pressure, left ear pain started this am         History of Present Illness   Youlanda Koyanagi presents to the clinic c/o    Sinusitis   This is a new problem  The current episode started today  The problem is unchanged  There has been no fever  The pain is moderate  Associated symptoms include congestion, sinus pressure and a sore throat  Pertinent negatives include no chills, coughing, diaphoresis, ear pain, headaches, hoarse voice, neck pain, shortness of breath, sneezing or swollen glands  Past treatments include nothing  The treatment provided no relief  Review of Systems   Review of Systems   Constitutional: Negative for activity change, appetite change, chills, diaphoresis, fatigue and fever  HENT: Positive for congestion, sinus pressure and sore throat   Negative for ear discharge, ear pain, facial swelling, hoarse voice, rhinorrhea, sinus pain and sneezing  Eyes: Negative for photophobia, pain, discharge, redness, itching and visual disturbance  Respiratory: Negative for apnea, cough, chest tightness, shortness of breath and wheezing  Cardiovascular: Negative for chest pain  Gastrointestinal: Negative for abdominal distention, abdominal pain, anal bleeding, blood in stool, constipation, diarrhea, nausea and vomiting  Genitourinary: Negative for dysuria, flank pain, frequency, hematuria and urgency  Musculoskeletal: Negative for arthralgias, back pain, gait problem, joint swelling, myalgias, neck pain and neck stiffness  Skin: Negative for color change, rash and wound  Allergic/Immunologic: Negative for immunocompromised state  Neurological: Negative for dizziness, facial asymmetry and headaches  Hematological: Negative for adenopathy  Psychiatric/Behavioral: Negative for confusion and decreased concentration           Current Medications     Long-Term Prescriptions   Medication Sig Dispense Refill    intrauterine copper (PARAGARD) IUD 1 each by Intrauterine route once      mometasone (NASONEX) 50 mcg/act nasal spray 2 sprays into each nostril daily 17 g 0       Current Allergies     Allergies as of 12/20/2018 - Reviewed 12/20/2018   Allergen Reaction Noted    Levofloxacin Hives 11/23/2016            The following portions of the patient's history were reviewed and updated as appropriate: allergies, current medications, past family history, past medical history, past social history, past surgical history and problem list   Past Medical History:   Diagnosis Date    Liver mass      Past Surgical History:   Procedure Laterality Date    KNEE SURGERY      TONSILLECTOMY       Social History     Social History    Marital status: Single     Spouse name: N/A    Number of children: N/A    Years of education: N/A     Occupational History    EMPLOYED      Social History Main Topics    Smoking status: Never Smoker    Smokeless tobacco: Never Used    Alcohol use Yes      Comment: socially    Drug use: No    Sexual activity: Not on file     Other Topics Concern    Not on file     Social History Narrative    EMPLOYED       Objective   /71 (BP Location: Right arm, Patient Position: Sitting, Cuff Size: Standard)   Pulse 94   Temp 97 5 °F (36 4 °C) (Tympanic)   Resp 18   Ht 5' 7" (1 702 m)   Wt 81 6 kg (180 lb)   SpO2 97%   BMI 28 19 kg/m²      Physical Exam     Physical Exam   Constitutional: She is oriented to person, place, and time  She appears well-developed and well-nourished  No distress  HENT:   Head: Normocephalic and atraumatic  Right Ear: Tympanic membrane and external ear normal    Left Ear: Tympanic membrane and external ear normal    Nose: No mucosal edema  Right sinus exhibits maxillary sinus tenderness  Right sinus exhibits no frontal sinus tenderness  Left sinus exhibits maxillary sinus tenderness  Left sinus exhibits no frontal sinus tenderness  Mouth/Throat: Oropharynx is clear and moist  No oropharyngeal exudate or posterior oropharyngeal erythema  Eyes: Pupils are equal, round, and reactive to light  Conjunctivae and EOM are normal  Right eye exhibits no discharge  Left eye exhibits no discharge  No scleral icterus  Neck: Normal range of motion  Neck supple  No JVD present  No tracheal deviation present  No thyromegaly present  Cardiovascular: Normal rate, regular rhythm and normal heart sounds  Exam reveals no gallop and no friction rub  No murmur heard  Pulmonary/Chest: Effort normal and breath sounds normal  No stridor  No respiratory distress  She has no decreased breath sounds  She has no wheezes  She has no rhonchi  She has no rales  She exhibits no tenderness  Musculoskeletal: Normal range of motion  She exhibits no tenderness or deformity  Lymphadenopathy:     She has no cervical adenopathy  Neurological: She is alert and oriented to person, place, and time  She has normal reflexes  Coordination normal    Skin: Skin is warm and dry  No rash noted  She is not diaphoretic  No erythema  No pallor  Psychiatric: She has a normal mood and affect  Her behavior is normal  Judgment and thought content normal    Nursing note and vitals reviewed        Amelia Samuels PA-C

## 2019-03-04 ENCOUNTER — TRANSCRIBE ORDERS (OUTPATIENT)
Dept: ADMINISTRATIVE | Facility: HOSPITAL | Age: 24
End: 2019-03-04

## 2019-03-04 DIAGNOSIS — H93.A1 PULSATILE TINNITUS OF RIGHT EAR: Primary | ICD-10-CM

## 2019-03-21 ENCOUNTER — HOSPITAL ENCOUNTER (OUTPATIENT)
Dept: MRI IMAGING | Facility: HOSPITAL | Age: 24
Discharge: HOME | End: 2019-03-21
Payer: COMMERCIAL

## 2019-03-21 ENCOUNTER — HOSPITAL ENCOUNTER (OUTPATIENT)
Dept: MRI IMAGING | Facility: HOSPITAL | Age: 24
End: 2019-03-21
Payer: COMMERCIAL

## 2019-03-21 DIAGNOSIS — H93.A1 PULSATILE TINNITUS OF RIGHT EAR: ICD-10-CM

## 2019-03-21 PROCEDURE — 70544 MR ANGIOGRAPHY HEAD W/O DYE: CPT

## 2019-03-21 PROCEDURE — A9585 GADOBUTROL INJECTION: HCPCS | Performed by: SPECIALIST

## 2019-03-21 PROCEDURE — 70553 MRI BRAIN STEM W/O & W/DYE: CPT

## 2019-03-21 PROCEDURE — 70549 MR ANGIOGRAPH NECK W/O&W/DYE: CPT

## 2019-03-21 RX ADMIN — GADOBUTROL 8 ML: 604.72 INJECTION INTRAVENOUS at 09:58

## 2019-05-08 ENCOUNTER — OFFICE VISIT (OUTPATIENT)
Dept: URGENT CARE | Facility: CLINIC | Age: 24
End: 2019-05-08
Payer: COMMERCIAL

## 2019-05-08 VITALS
HEIGHT: 67 IN | DIASTOLIC BLOOD PRESSURE: 82 MMHG | BODY MASS INDEX: 28.25 KG/M2 | SYSTOLIC BLOOD PRESSURE: 126 MMHG | TEMPERATURE: 99.9 F | WEIGHT: 180 LBS | RESPIRATION RATE: 18 BRPM | HEART RATE: 88 BPM | OXYGEN SATURATION: 99 %

## 2019-05-08 DIAGNOSIS — J06.9 VIRAL URI: Primary | ICD-10-CM

## 2019-05-08 PROCEDURE — 99213 OFFICE O/P EST LOW 20 MIN: CPT

## 2019-05-08 RX ORDER — PREDNISONE 10 MG/1
TABLET ORAL
Qty: 30 TABLET | Refills: 0 | Status: SHIPPED | OUTPATIENT
Start: 2019-05-08 | End: 2020-02-08

## 2019-09-25 ENCOUNTER — APPOINTMENT (OUTPATIENT)
Dept: URGENT CARE | Facility: CLINIC | Age: 24
End: 2019-09-25

## 2019-09-25 DIAGNOSIS — Z02.1 PHYSICAL EXAM, PRE-EMPLOYMENT: Primary | ICD-10-CM

## 2019-09-25 PROCEDURE — 86480 TB TEST CELL IMMUN MEASURE: CPT | Performed by: FAMILY MEDICINE

## 2019-09-27 LAB
GAMMA INTERFERON BACKGROUND BLD IA-ACNC: 0.04 IU/ML
M TB IFN-G BLD-IMP: NEGATIVE
M TB IFN-G CD4+ BCKGRND COR BLD-ACNC: 0 IU/ML
M TB IFN-G CD4+ BCKGRND COR BLD-ACNC: 0 IU/ML
MITOGEN IGNF BCKGRD COR BLD-ACNC: >10 IU/ML

## 2019-11-21 ENCOUNTER — TELEPHONE (OUTPATIENT)
Dept: OBGYN CLINIC | Facility: MEDICAL CENTER | Age: 24
End: 2019-11-21

## 2019-11-21 NOTE — TELEPHONE ENCOUNTER
----- Message from Terrell Morton sent at 11/21/2019  8:42 AM EST -----  Pt coming in for a possible IUD removal  New to us  I looked up her ID# on an[employer]  ID # is [id number]  I contacted ramya garcia's insurance  Pt effective 10/7/17  Pt is covered at 100% for removal  No PA needed   Refugio Serrato Ref# [id number]

## 2019-11-27 ENCOUNTER — OFFICE VISIT (OUTPATIENT)
Dept: OBGYN CLINIC | Facility: MEDICAL CENTER | Age: 24
End: 2019-11-27
Payer: COMMERCIAL

## 2019-11-27 VITALS — DIASTOLIC BLOOD PRESSURE: 70 MMHG | SYSTOLIC BLOOD PRESSURE: 110 MMHG | BODY MASS INDEX: 26.33 KG/M2 | WEIGHT: 168.1 LBS

## 2019-11-27 DIAGNOSIS — Z30.432 ENCOUNTER FOR IUD REMOVAL: ICD-10-CM

## 2019-11-27 DIAGNOSIS — Z01.419 ENCOUNTER FOR WELL WOMAN EXAM WITH ROUTINE GYNECOLOGICAL EXAM: Primary | ICD-10-CM

## 2019-11-27 PROCEDURE — 99385 PREV VISIT NEW AGE 18-39: CPT | Performed by: OBSTETRICS & GYNECOLOGY

## 2019-11-27 PROCEDURE — 58301 REMOVE INTRAUTERINE DEVICE: CPT | Performed by: OBSTETRICS & GYNECOLOGY

## 2019-11-27 NOTE — PROGRESS NOTES
ASSESSMENT & PLAN: Melina Tate is a 25 y o  Walda Dial with normal gynecologic exam     1   Routine well woman exam done today  2  Pap:  The patient's last pap was 2018  It was normal     Pap was not done today  Current ASCCP Guidelines reviewed  Due   3  STD testing  was not done   4  The following were reviewed in today's visit: breast self exam  5  Paraguard removed     CC:  Annual Gynecologic Examination    HPI: Melina Tate is a 25 y o  Walda Dial who presents for annual gynecologic examination  She has the following concerns:  Patient has a history of FNH  Paraguard was placed about 2 years ago  Has been experiencing increased bleeding, where she changes her super plus tampon every 45 minutes, and increased cramping  She states her bleeding is heavy for 4-7 days  Was using Lysteda with some relief  at times, she would bleed through her clothes  Would like to stay off contraception for now  Discussed progestin releasing IUDs as well  Health Maintenance:    She wears her seatbelt routinely  She does perform regular monthly self breast exams  She feels safe at home  Past Medical History:   Diagnosis Date    Liver mass        Past Surgical History:   Procedure Laterality Date    KNEE SURGERY      TONSILLECTOMY         OB/Gyn History:    Pt has menstrual issues  See above    History of sexually transmitted infection: No   History of abnormal pap smears: No      Patient is currently sexually active  The current method of family planning is none      OB History        0    Para   0    Term   0       0    AB   0    Living   0       SAB   0    TAB   0    Ectopic   0    Multiple   0    Live Births   0                 Family History   Problem Relation Age of Onset    No Known Problems Mother     Coronary artery disease Father     Testicular cancer Father     Heart attack Father     Royce's disease Father     No Known Problems Maternal Grandmother     No Known Problems Paternal Grandmother     Lupus Paternal Grandfather        Social History:  Social History     Socioeconomic History    Marital status: Single     Spouse name: Not on file    Number of children: Not on file    Years of education: Not on file    Highest education level: Not on file   Occupational History    Occupation: EMPLOYED   Social Needs    Financial resource strain: Not on file    Food insecurity:     Worry: Not on file     Inability: Not on file    Transportation needs:     Medical: Not on file     Non-medical: Not on file   Tobacco Use    Smoking status: Never Smoker    Smokeless tobacco: Never Used   Substance and Sexual Activity    Alcohol use: Yes     Frequency: 2-4 times a month     Comment: socially    Drug use: No    Sexual activity: Yes     Partners: Male     Birth control/protection: Implant   Lifestyle    Physical activity:     Days per week: Not on file     Minutes per session: Not on file    Stress: Not on file   Relationships    Social connections:     Talks on phone: Not on file     Gets together: Not on file     Attends Alevism service: Not on file     Active member of club or organization: Not on file     Attends meetings of clubs or organizations: Not on file     Relationship status: Not on file    Intimate partner violence:     Fear of current or ex partner: Not on file     Emotionally abused: Not on file     Physically abused: Not on file     Forced sexual activity: Not on file   Other Topics Concern    Not on file   Social History Narrative    EMPLOYED     Patient is single    Patient is currently employed     Allergies   Allergen Reactions    Levofloxacin Hives         Current Outpatient Medications:     intrauterine copper (PARAGARD) IUD, 1 each by Intrauterine route once, Disp: , Rfl:     methylprednisolone (MEDROL) 4 mg tablet, Medrol dosepak, take as directed (Patient not taking: Reported on 11/27/2019), Disp: 21 tablet, Rfl: 0    mometasone (NASONEX) 50 mcg/act nasal spray, 2 sprays into each nostril daily (Patient not taking: Reported on 11/27/2019), Disp: 17 g, Rfl: 0    predniSONE 10 mg tablet, 5 X 2 days, 4 X 2, 3 X 2, 2 X 2, 1 x 2 (Patient not taking: Reported on 11/27/2019), Disp: 30 tablet, Rfl: 0    Review of Systems:  Constitutional :no fever, feels well, no tiredness, no recent weight gain or loss  ENT: no ear ache, no loss of hearing, no nosebleeds or nasal discharge, no sore throat or hoarseness  Cardiovascular: no complaints of slow or fast heart beat, no chest pain, no palpitations, no leg claudication or lower extremity edema  Respiratory: no complaints of shortness of shortness of breath, no FRIAS  Breasts:no complaints of breast pain, breast lump, or nipple discharge  Gastrointestinal: no complaints of abdominal pain, constipation, nausea, vomiting, or diarrhea or bloody stools  Genitourinary : no complaints of dysuria, incontinence, pelvic pain, no dysmenorrhea, vaginal discharge or abnormal vaginal bleeding and as noted in HPI  Musculoskeletal: no complaints of arthralgia, no myalgia, no joint swelling or stiffness, no limb pain or swelling    Integumentary: no complaints of skin rash or lesion, itching or dry skin  Neurological: no complaints of headache, no confusion, no numbness or tingling, no dizziness or fainting    Objective      /70   Wt 76 2 kg (168 lb 1 6 oz)   LMP 11/20/2019   BMI 26 33 kg/m²     General:   appears stated age, cooperative, alert normal mood and affect   Neck: normal, supple,trachea midline, no masses   Heart: regular rate and rhythm, S1, S2 normal, no murmur, click, rub or gallop   Lungs: clear to auscultation bilaterally   Breasts: normal appearance, no masses or tenderness, Inspection negative, No nipple retraction or dimpling, No nipple discharge or bleeding, No axillary or supraclavicular adenopathy, Normal to palpation without dominant masses   Abdomen: soft, non-tender, without masses or organomegaly Vulva: normal female genitalia, Bartholin's, Urethra, Chical normal, no lesions, normal female hair distribution   Vagina: normal vagina, no discharge, exudate, lesion, or erythema   Urethra: normal   Cervix: Normal, no discharge  IUD strings present  IUD removed today, see separate procedure note   Uterus: normal size, contour, position, consistency, mobility, non-tender   Adnexa: normal adnexa and no mass, fullness, tenderness   Lymphatic palpation of lymph nodes in neck, axilla, groin and/or other locations: no lymphadenopathy or masses noted   Skin normal skin turgor and no rashes     Psychiatric orientation to person, place, and time: normal  mood and affect: normal

## 2019-11-27 NOTE — PROGRESS NOTES
Iud removal  Date/Time: 11/27/2019 2:30 PM  Performed by: Hans Canavan, MD  Authorized by: Hans Canavan, MD     Consent:     Consent obtained:  Verbal and written    Consent given by:  Patient    Procedure risks and benefits discussed: yes      Patient questions answered: yes      Patient agrees, verbalizes understanding, and wants to proceed: yes    Procedure:     Removed with no complications: yes      Other reason for removal:  Intolerable side effects

## 2020-01-28 ENCOUNTER — TELEPHONE (OUTPATIENT)
Dept: INTERNAL MEDICINE CLINIC | Facility: CLINIC | Age: 25
End: 2020-01-28

## 2020-01-28 DIAGNOSIS — M25.542 ARTHRALGIA OF LEFT HAND: ICD-10-CM

## 2020-01-28 DIAGNOSIS — Z13.29 SCREENING FOR THYROID DISORDER: ICD-10-CM

## 2020-01-28 DIAGNOSIS — I10 BENIGN ESSENTIAL HTN: Primary | ICD-10-CM

## 2020-01-28 DIAGNOSIS — Z13.220 SCREENING, LIPID: ICD-10-CM

## 2020-01-28 DIAGNOSIS — Z13.1 SCREENING FOR DIABETES MELLITUS: ICD-10-CM

## 2020-01-28 DIAGNOSIS — E55.9 VITAMIN D DEFICIENCY: ICD-10-CM

## 2020-01-28 DIAGNOSIS — Z13.0 SCREENING FOR DEFICIENCY ANEMIA: ICD-10-CM

## 2020-01-28 NOTE — TELEPHONE ENCOUNTER
Pt called-she has an appt next Saturday with you but she has extreme pain in her ring finger of left hand  She has a constant pain in her finger-it was really bad on Saturday but it is not as bad now but still hurting  There is no known injury  It is tender and a little swollen around the knucke  It feels like a tendonitis or arthritis or something she said-any advice for her?

## 2020-01-30 ENCOUNTER — APPOINTMENT (OUTPATIENT)
Dept: RADIOLOGY | Facility: CLINIC | Age: 25
End: 2020-01-30
Payer: COMMERCIAL

## 2020-01-30 ENCOUNTER — APPOINTMENT (OUTPATIENT)
Dept: LAB | Facility: CLINIC | Age: 25
End: 2020-01-30
Payer: COMMERCIAL

## 2020-01-30 DIAGNOSIS — E55.9 VITAMIN D DEFICIENCY: ICD-10-CM

## 2020-01-30 DIAGNOSIS — M25.542 ARTHRALGIA OF LEFT HAND: ICD-10-CM

## 2020-01-30 DIAGNOSIS — Z13.0 SCREENING FOR DEFICIENCY ANEMIA: ICD-10-CM

## 2020-01-30 DIAGNOSIS — Z13.220 SCREENING, LIPID: ICD-10-CM

## 2020-01-30 DIAGNOSIS — Z13.1 SCREENING FOR DIABETES MELLITUS: ICD-10-CM

## 2020-01-30 DIAGNOSIS — Z13.29 SCREENING FOR THYROID DISORDER: ICD-10-CM

## 2020-01-30 LAB
25(OH)D3 SERPL-MCNC: 17.9 NG/ML (ref 30–100)
ALBUMIN SERPL BCP-MCNC: 3.9 G/DL (ref 3.5–5)
ALP SERPL-CCNC: 74 U/L (ref 46–116)
ALT SERPL W P-5'-P-CCNC: 21 U/L (ref 12–78)
ANION GAP SERPL CALCULATED.3IONS-SCNC: 2 MMOL/L (ref 4–13)
AST SERPL W P-5'-P-CCNC: 11 U/L (ref 5–45)
BASOPHILS # BLD AUTO: 0.02 THOUSANDS/ΜL (ref 0–0.1)
BASOPHILS NFR BLD AUTO: 0 % (ref 0–1)
BILIRUB SERPL-MCNC: 0.42 MG/DL (ref 0.2–1)
BUN SERPL-MCNC: 10 MG/DL (ref 5–25)
CALCIUM SERPL-MCNC: 9 MG/DL (ref 8.3–10.1)
CHLORIDE SERPL-SCNC: 109 MMOL/L (ref 100–108)
CHOLEST SERPL-MCNC: 163 MG/DL (ref 50–200)
CO2 SERPL-SCNC: 29 MMOL/L (ref 21–32)
CREAT SERPL-MCNC: 0.72 MG/DL (ref 0.6–1.3)
CRP SERPL QL: <3 MG/L
EOSINOPHIL # BLD AUTO: 0.05 THOUSAND/ΜL (ref 0–0.61)
EOSINOPHIL NFR BLD AUTO: 1 % (ref 0–6)
ERYTHROCYTE [DISTWIDTH] IN BLOOD BY AUTOMATED COUNT: 14.5 % (ref 11.6–15.1)
GFR SERPL CREATININE-BSD FRML MDRD: 118 ML/MIN/1.73SQ M
GLUCOSE P FAST SERPL-MCNC: 92 MG/DL (ref 65–99)
HCT VFR BLD AUTO: 38.7 % (ref 34.8–46.1)
HDLC SERPL-MCNC: 60 MG/DL
HGB BLD-MCNC: 11.7 G/DL (ref 11.5–15.4)
IMM GRANULOCYTES # BLD AUTO: 0.01 THOUSAND/UL (ref 0–0.2)
IMM GRANULOCYTES NFR BLD AUTO: 0 % (ref 0–2)
LDLC SERPL CALC-MCNC: 91 MG/DL (ref 0–100)
LYMPHOCYTES # BLD AUTO: 1.73 THOUSANDS/ΜL (ref 0.6–4.47)
LYMPHOCYTES NFR BLD AUTO: 34 % (ref 14–44)
MCH RBC QN AUTO: 26.1 PG (ref 26.8–34.3)
MCHC RBC AUTO-ENTMCNC: 30.2 G/DL (ref 31.4–37.4)
MCV RBC AUTO: 86 FL (ref 82–98)
MONOCYTES # BLD AUTO: 0.36 THOUSAND/ΜL (ref 0.17–1.22)
MONOCYTES NFR BLD AUTO: 7 % (ref 4–12)
NEUTROPHILS # BLD AUTO: 2.97 THOUSANDS/ΜL (ref 1.85–7.62)
NEUTS SEG NFR BLD AUTO: 58 % (ref 43–75)
NONHDLC SERPL-MCNC: 103 MG/DL
NRBC BLD AUTO-RTO: 0 /100 WBCS
PLATELET # BLD AUTO: 282 THOUSANDS/UL (ref 149–390)
PMV BLD AUTO: 10.7 FL (ref 8.9–12.7)
POTASSIUM SERPL-SCNC: 4.8 MMOL/L (ref 3.5–5.3)
PROT SERPL-MCNC: 8 G/DL (ref 6.4–8.2)
RBC # BLD AUTO: 4.48 MILLION/UL (ref 3.81–5.12)
RHEUMATOID FACT SER QL LA: NEGATIVE
SODIUM SERPL-SCNC: 140 MMOL/L (ref 136–145)
TRIGL SERPL-MCNC: 61 MG/DL
TSH SERPL DL<=0.05 MIU/L-ACNC: 2.21 UIU/ML (ref 0.36–3.74)
URATE SERPL-MCNC: 6 MG/DL (ref 2–6.8)
WBC # BLD AUTO: 5.14 THOUSAND/UL (ref 4.31–10.16)

## 2020-01-30 PROCEDURE — 80053 COMPREHEN METABOLIC PANEL: CPT

## 2020-01-30 PROCEDURE — 86618 LYME DISEASE ANTIBODY: CPT

## 2020-01-30 PROCEDURE — 36415 COLL VENOUS BLD VENIPUNCTURE: CPT

## 2020-01-30 PROCEDURE — 86140 C-REACTIVE PROTEIN: CPT

## 2020-01-30 PROCEDURE — 82306 VITAMIN D 25 HYDROXY: CPT

## 2020-01-30 PROCEDURE — 80061 LIPID PANEL: CPT

## 2020-01-30 PROCEDURE — 85025 COMPLETE CBC W/AUTO DIFF WBC: CPT

## 2020-01-30 PROCEDURE — 86038 ANTINUCLEAR ANTIBODIES: CPT

## 2020-01-30 PROCEDURE — 86430 RHEUMATOID FACTOR TEST QUAL: CPT

## 2020-01-30 PROCEDURE — 73130 X-RAY EXAM OF HAND: CPT

## 2020-01-30 PROCEDURE — 84550 ASSAY OF BLOOD/URIC ACID: CPT

## 2020-01-30 PROCEDURE — 84443 ASSAY THYROID STIM HORMONE: CPT

## 2020-01-31 LAB
B BURGDOR IGG+IGM SER-ACNC: <0.91 ISR (ref 0–0.9)
RYE IGE QN: NEGATIVE

## 2020-02-08 ENCOUNTER — OFFICE VISIT (OUTPATIENT)
Dept: INTERNAL MEDICINE CLINIC | Facility: CLINIC | Age: 25
End: 2020-02-08
Payer: COMMERCIAL

## 2020-02-08 VITALS
DIASTOLIC BLOOD PRESSURE: 76 MMHG | HEIGHT: 67 IN | SYSTOLIC BLOOD PRESSURE: 118 MMHG | OXYGEN SATURATION: 99 % | RESPIRATION RATE: 16 BRPM | BODY MASS INDEX: 26.84 KG/M2 | TEMPERATURE: 98.8 F | HEART RATE: 74 BPM | WEIGHT: 171 LBS

## 2020-02-08 DIAGNOSIS — Z91.030 BEE STING ALLERGY: ICD-10-CM

## 2020-02-08 DIAGNOSIS — R53.83 FATIGUE, UNSPECIFIED TYPE: Primary | ICD-10-CM

## 2020-02-08 PROCEDURE — 3074F SYST BP LT 130 MM HG: CPT | Performed by: PHYSICIAN ASSISTANT

## 2020-02-08 PROCEDURE — 1036F TOBACCO NON-USER: CPT | Performed by: PHYSICIAN ASSISTANT

## 2020-02-08 PROCEDURE — 3078F DIAST BP <80 MM HG: CPT | Performed by: PHYSICIAN ASSISTANT

## 2020-02-08 PROCEDURE — 3008F BODY MASS INDEX DOCD: CPT | Performed by: PHYSICIAN ASSISTANT

## 2020-02-08 PROCEDURE — 99213 OFFICE O/P EST LOW 20 MIN: CPT | Performed by: PHYSICIAN ASSISTANT

## 2020-02-08 RX ORDER — ERGOCALCIFEROL 1.25 MG/1
50000 CAPSULE ORAL WEEKLY
Qty: 4 CAPSULE | Refills: 3 | Status: SHIPPED | OUTPATIENT
Start: 2020-02-08 | End: 2022-01-25 | Stop reason: SDUPTHER

## 2020-02-08 RX ORDER — EPINEPHRINE 0.3 MG/.3ML
0.3 INJECTION SUBCUTANEOUS ONCE
Qty: 0.6 ML | Refills: 2 | Status: SHIPPED | OUTPATIENT
Start: 2020-02-08 | End: 2022-01-18

## 2020-02-08 NOTE — PROGRESS NOTES
Assessment/Plan:  Problem List Items Addressed This Visit        Other    Fatigue - Primary     Will check labs to evaluate  Treat according to results  Relevant Medications    ergocalciferol (VITAMIN D2) 50,000 units    Other Relevant Orders    Vitamin B12    Iron    TIBC    Ferritin      Other Visit Diagnoses     Bee sting allergy        Relevant Medications    EPINEPHrine (EPIPEN) 0 3 mg/0 3 mL SOAJ           Diagnoses and all orders for this visit:    Fatigue, unspecified type  -     ergocalciferol (VITAMIN D2) 50,000 units; Take 1 capsule (50,000 Units total) by mouth once a week  -     Vitamin B12; Future  -     Iron; Future  -     TIBC; Future  -     Ferritin; Future    Bee sting allergy  -     EPINEPHrine (EPIPEN) 0 3 mg/0 3 mL SOAJ; Inject 0 3 mL (0 3 mg total) into a muscle once for 1 dose        Fatigue  Will check labs to evaluate  Treat according to results  Subjective:      Patient ID: Olegario Allen is a 25 y o  female  Pt presents for routine visit  She is doing well overall  Labs are up to date  She does complain of increased fatigue  Vitals are all normal        The following portions of the patient's history were reviewed and updated as appropriate:   She has a past medical history of Liver mass  ,  does not have any pertinent problems on file  ,   has a past surgical history that includes Knee surgery and Tonsillectomy  ,  family history includes [place]'s disease in her father; Coronary artery disease in her father; Heart attack in her father; Lupus in her paternal grandfather; No Known Problems in her maternal grandmother, mother, and paternal grandmother; Testicular cancer in her father  ,   reports that she has never smoked  She has never used smokeless tobacco  She reports that she drinks alcohol  She reports that she does not use drugs  ,  is allergic to levofloxacin     Current Outpatient Medications   Medication Sig Dispense Refill    mometasone (NASONEX) 50 mcg/act nasal spray 2 sprays into each nostril daily 17 g 0    EPINEPHrine (EPIPEN) 0 3 mg/0 3 mL SOAJ Inject 0 3 mL (0 3 mg total) into a muscle once for 1 dose 0 6 mL 2    ergocalciferol (VITAMIN D2) 50,000 units Take 1 capsule (50,000 Units total) by mouth once a week 4 capsule 3     No current facility-administered medications for this visit  Review of Systems   Constitutional: Negative for chills and fever  HENT: Negative for congestion, ear pain, hearing loss, postnasal drip, rhinorrhea, sinus pressure, sinus pain, sore throat and trouble swallowing  Eyes: Negative for pain and visual disturbance  Respiratory: Negative for cough, chest tightness, shortness of breath and wheezing  Cardiovascular: Negative  Negative for chest pain, palpitations and leg swelling  Gastrointestinal: Negative for abdominal pain, blood in stool, constipation, diarrhea, nausea and vomiting  Endocrine: Negative for cold intolerance, heat intolerance, polydipsia, polyphagia and polyuria  Genitourinary: Negative for difficulty urinating, dysuria, flank pain and urgency  Musculoskeletal: Negative for arthralgias, back pain, gait problem and myalgias  Skin: Negative for rash  Allergic/Immunologic: Negative  Neurological: Negative for dizziness, weakness, light-headedness and headaches  Hematological: Negative  Psychiatric/Behavioral: Negative for behavioral problems, dysphoric mood and sleep disturbance  The patient is not nervous/anxious            PHQ-9 Depression Screening    PHQ-9:    Frequency of the following problems over the past two weeks:       Little interest or pleasure in doing things:  0 - not at all  Feeling down, depressed, or hopeless:  0 - not at all  PHQ-2 Score:  0          Objective:  Vitals:    02/08/20 0938   BP: 118/76   BP Location: Left arm   Patient Position: Sitting   Cuff Size: Large   Pulse: 74   Resp: 16   Temp: 98 8 °F (37 1 °C)   SpO2: 99%   Weight: 77 6 kg (171 lb)   Height: 5' 7" (1 702 m)     Body mass index is 26 78 kg/m²  Physical Exam   Constitutional: She is oriented to person, place, and time  She appears well-developed and well-nourished  No distress  HENT:   Head: Normocephalic and atraumatic  Right Ear: External ear normal    Left Ear: External ear normal    Nose: Nose normal    Mouth/Throat: Oropharynx is clear and moist  No oropharyngeal exudate  Eyes: Pupils are equal, round, and reactive to light  Conjunctivae and EOM are normal  Right eye exhibits no discharge  Left eye exhibits no discharge  No scleral icterus  Neck: Normal range of motion  Neck supple  No thyromegaly present  Cardiovascular: Normal rate, regular rhythm and normal heart sounds  Exam reveals no gallop and no friction rub  No murmur heard  Pulmonary/Chest: Effort normal and breath sounds normal  No respiratory distress  She has no wheezes  She has no rales  Abdominal: Soft  Bowel sounds are normal  She exhibits no distension  There is no tenderness  Musculoskeletal: Normal range of motion  She exhibits no edema, tenderness or deformity  Neurological: She is alert and oriented to person, place, and time  No cranial nerve deficit  Skin: Skin is warm and dry  She is not diaphoretic  Psychiatric: She has a normal mood and affect  Her behavior is normal  Judgment and thought content normal      BMI Counseling: Body mass index is 26 78 kg/m²  The BMI is above normal  Nutrition recommendations include decreasing portion sizes, consuming healthier snacks and limiting drinks that contain sugar

## 2020-02-13 ENCOUNTER — APPOINTMENT (OUTPATIENT)
Dept: LAB | Facility: CLINIC | Age: 25
End: 2020-02-13
Payer: COMMERCIAL

## 2020-02-13 DIAGNOSIS — R53.83 FATIGUE, UNSPECIFIED TYPE: ICD-10-CM

## 2020-02-13 LAB
FERRITIN SERPL-MCNC: 4 NG/ML (ref 8–388)
IRON SERPL-MCNC: 41 UG/DL (ref 50–170)
TIBC SERPL-MCNC: 401 UG/DL (ref 250–450)
VIT B12 SERPL-MCNC: 403 PG/ML (ref 100–900)

## 2020-02-13 PROCEDURE — 82607 VITAMIN B-12: CPT

## 2020-02-13 PROCEDURE — 36415 COLL VENOUS BLD VENIPUNCTURE: CPT

## 2020-02-13 PROCEDURE — 83540 ASSAY OF IRON: CPT

## 2020-02-13 PROCEDURE — 83550 IRON BINDING TEST: CPT

## 2020-02-13 PROCEDURE — 82728 ASSAY OF FERRITIN: CPT

## 2020-05-19 DIAGNOSIS — J45.990 EXERCISE-INDUCED ASTHMA: Primary | ICD-10-CM

## 2020-05-19 RX ORDER — ALBUTEROL SULFATE 90 UG/1
2 AEROSOL, METERED RESPIRATORY (INHALATION) EVERY 6 HOURS PRN
Qty: 1 INHALER | Refills: 5 | Status: SHIPPED | OUTPATIENT
Start: 2020-05-19

## 2020-06-23 DIAGNOSIS — R16.0 LIVER MASS: Primary | ICD-10-CM

## 2020-07-09 ENCOUNTER — HOSPITAL ENCOUNTER (OUTPATIENT)
Dept: MRI IMAGING | Facility: HOSPITAL | Age: 25
Discharge: HOME | End: 2020-07-09
Payer: COMMERCIAL

## 2020-07-09 DIAGNOSIS — R16.0 LIVER MASS: ICD-10-CM

## 2020-07-09 PROCEDURE — 74183 MRI ABD W/O CNTR FLWD CNTR: CPT

## 2020-07-09 PROCEDURE — A9581 GADOXETATE DISODIUM INJ: HCPCS | Performed by: PHYSICIAN ASSISTANT

## 2020-07-09 RX ADMIN — GADOXETATE DISODIUM 8 ML: 181.43 INJECTION, SOLUTION INTRAVENOUS at 18:33

## 2020-07-15 ENCOUNTER — TELEPHONE (OUTPATIENT)
Dept: INTERNAL MEDICINE CLINIC | Facility: CLINIC | Age: 25
End: 2020-07-15

## 2020-07-15 NOTE — TELEPHONE ENCOUNTER
Pt called, she was concerned about her MRI results when she read it in my chart  I gave her the message you has in her chart  Pt wants to know if she should f/u with anything else since it said she could've had pneumonia

## 2020-07-20 DIAGNOSIS — R06.02 SOB (SHORTNESS OF BREATH): Primary | ICD-10-CM

## 2020-07-24 ENCOUNTER — TRANSCRIBE ORDERS (OUTPATIENT)
Dept: RADIOLOGY | Facility: HOSPITAL | Age: 25
End: 2020-07-24

## 2020-07-24 ENCOUNTER — HOSPITAL ENCOUNTER (OUTPATIENT)
Dept: RADIOLOGY | Facility: HOSPITAL | Age: 25
Discharge: HOME | End: 2020-07-24
Payer: COMMERCIAL

## 2020-07-24 DIAGNOSIS — R06.02 SOB (SHORTNESS OF BREATH): ICD-10-CM

## 2020-07-24 PROCEDURE — 71046 X-RAY EXAM CHEST 2 VIEWS: CPT

## 2020-10-08 ENCOUNTER — OFFICE VISIT (OUTPATIENT)
Dept: OBGYN CLINIC | Facility: CLINIC | Age: 25
End: 2020-10-08
Payer: COMMERCIAL

## 2020-10-08 VITALS
WEIGHT: 175.4 LBS | HEIGHT: 67 IN | SYSTOLIC BLOOD PRESSURE: 120 MMHG | TEMPERATURE: 97.7 F | BODY MASS INDEX: 27.53 KG/M2 | DIASTOLIC BLOOD PRESSURE: 76 MMHG | HEART RATE: 71 BPM

## 2020-10-08 DIAGNOSIS — Z30.011 ENCOUNTER FOR BCP (BIRTH CONTROL PILLS) INITIAL PRESCRIPTION: Primary | ICD-10-CM

## 2020-10-08 DIAGNOSIS — K76.89 FOCAL NODULAR HYPERPLASIA OF LIVER: ICD-10-CM

## 2020-10-08 PROCEDURE — 99213 OFFICE O/P EST LOW 20 MIN: CPT | Performed by: OBSTETRICS & GYNECOLOGY

## 2020-10-08 RX ORDER — ACETAMINOPHEN AND CODEINE PHOSPHATE 120; 12 MG/5ML; MG/5ML
1 SOLUTION ORAL DAILY
Qty: 84 TABLET | Refills: 3 | Status: SHIPPED | OUTPATIENT
Start: 2020-10-08 | End: 2022-01-18

## 2020-10-12 ENCOUNTER — TELEPHONE (OUTPATIENT)
Dept: OBGYN CLINIC | Facility: CLINIC | Age: 25
End: 2020-10-12

## 2020-10-12 DIAGNOSIS — K76.89 FOCAL NODULAR HYPERPLASIA OF LIVER: Primary | ICD-10-CM

## 2020-10-29 ENCOUNTER — TELEPHONE (OUTPATIENT)
Dept: OTHER | Facility: HOSPITAL | Age: 25
End: 2020-10-29

## 2020-11-02 ENCOUNTER — CLINICAL SUPPORT (OUTPATIENT)
Dept: OBGYN CLINIC | Facility: CLINIC | Age: 25
End: 2020-11-02
Payer: COMMERCIAL

## 2020-11-02 VITALS
HEART RATE: 89 BPM | SYSTOLIC BLOOD PRESSURE: 118 MMHG | HEIGHT: 67 IN | BODY MASS INDEX: 27.69 KG/M2 | WEIGHT: 176.4 LBS | DIASTOLIC BLOOD PRESSURE: 76 MMHG

## 2020-11-02 DIAGNOSIS — Z23 NEED FOR VACCINATION: Primary | ICD-10-CM

## 2020-11-02 PROCEDURE — 90471 IMMUNIZATION ADMIN: CPT | Performed by: OBSTETRICS & GYNECOLOGY

## 2020-11-02 PROCEDURE — 96372 THER/PROPH/DIAG INJ SC/IM: CPT | Performed by: OBSTETRICS & GYNECOLOGY

## 2020-11-02 PROCEDURE — 90651 9VHPV VACCINE 2/3 DOSE IM: CPT | Performed by: OBSTETRICS & GYNECOLOGY

## 2020-12-02 ENCOUNTER — TELEMEDICINE (OUTPATIENT)
Dept: INTERNAL MEDICINE CLINIC | Facility: CLINIC | Age: 25
End: 2020-12-02
Payer: COMMERCIAL

## 2020-12-02 VITALS — TEMPERATURE: 99.2 F | WEIGHT: 178 LBS | HEIGHT: 67 IN | BODY MASS INDEX: 27.94 KG/M2

## 2020-12-02 DIAGNOSIS — B34.9 VIRAL INFECTION, UNSPECIFIED: ICD-10-CM

## 2020-12-02 DIAGNOSIS — B34.9 VIRAL INFECTION, UNSPECIFIED: Primary | ICD-10-CM

## 2020-12-02 PROCEDURE — 99213 OFFICE O/P EST LOW 20 MIN: CPT | Performed by: PHYSICIAN ASSISTANT

## 2020-12-02 PROCEDURE — U0003 INFECTIOUS AGENT DETECTION BY NUCLEIC ACID (DNA OR RNA); SEVERE ACUTE RESPIRATORY SYNDROME CORONAVIRUS 2 (SARS-COV-2) (CORONAVIRUS DISEASE [COVID-19]), AMPLIFIED PROBE TECHNIQUE, MAKING USE OF HIGH THROUGHPUT TECHNOLOGIES AS DESCRIBED BY CMS-2020-01-R: HCPCS | Performed by: PHYSICIAN ASSISTANT

## 2020-12-03 ENCOUNTER — TELEPHONE (OUTPATIENT)
Dept: INTERNAL MEDICINE CLINIC | Facility: CLINIC | Age: 25
End: 2020-12-03

## 2020-12-03 ENCOUNTER — TELEMEDICINE (OUTPATIENT)
Dept: INTERNAL MEDICINE CLINIC | Facility: CLINIC | Age: 25
End: 2020-12-03
Payer: COMMERCIAL

## 2020-12-03 VITALS — HEIGHT: 67 IN | BODY MASS INDEX: 27.94 KG/M2 | WEIGHT: 178 LBS | TEMPERATURE: 99.1 F

## 2020-12-03 DIAGNOSIS — U07.1 COVID-19: Primary | ICD-10-CM

## 2020-12-03 LAB — SARS-COV-2 RNA SPEC QL NAA+PROBE: DETECTED

## 2020-12-03 PROCEDURE — 99213 OFFICE O/P EST LOW 20 MIN: CPT | Performed by: PHYSICIAN ASSISTANT

## 2020-12-05 ENCOUNTER — TELEMEDICINE (OUTPATIENT)
Dept: INTERNAL MEDICINE CLINIC | Facility: CLINIC | Age: 25
End: 2020-12-05
Payer: COMMERCIAL

## 2020-12-05 DIAGNOSIS — U07.1 COVID-19: Primary | ICD-10-CM

## 2020-12-05 PROCEDURE — 99213 OFFICE O/P EST LOW 20 MIN: CPT | Performed by: PHYSICIAN ASSISTANT

## 2020-12-07 ENCOUNTER — TELEMEDICINE (OUTPATIENT)
Dept: INTERNAL MEDICINE CLINIC | Facility: CLINIC | Age: 25
End: 2020-12-07
Payer: COMMERCIAL

## 2020-12-07 ENCOUNTER — TELEPHONE (OUTPATIENT)
Dept: INTERNAL MEDICINE CLINIC | Facility: CLINIC | Age: 25
End: 2020-12-07

## 2020-12-07 VITALS — HEIGHT: 67 IN | WEIGHT: 178 LBS | BODY MASS INDEX: 27.94 KG/M2

## 2020-12-07 DIAGNOSIS — U07.1 COVID-19: Primary | ICD-10-CM

## 2020-12-07 PROCEDURE — 99213 OFFICE O/P EST LOW 20 MIN: CPT | Performed by: PHYSICIAN ASSISTANT

## 2020-12-29 ENCOUNTER — ANNUAL EXAM (OUTPATIENT)
Dept: OBGYN CLINIC | Facility: CLINIC | Age: 25
End: 2020-12-29
Payer: COMMERCIAL

## 2020-12-29 VITALS
HEART RATE: 88 BPM | DIASTOLIC BLOOD PRESSURE: 88 MMHG | TEMPERATURE: 98 F | SYSTOLIC BLOOD PRESSURE: 112 MMHG | BODY MASS INDEX: 27.81 KG/M2 | WEIGHT: 177.2 LBS | HEIGHT: 67 IN

## 2020-12-29 DIAGNOSIS — Z01.419 ENCOUNTER FOR GYNECOLOGICAL EXAMINATION (GENERAL) (ROUTINE) WITHOUT ABNORMAL FINDINGS: Primary | ICD-10-CM

## 2020-12-29 DIAGNOSIS — N92.1 BREAKTHROUGH BLEEDING ON OCPS: ICD-10-CM

## 2020-12-29 DIAGNOSIS — Z12.4 CERVICAL CANCER SCREENING: ICD-10-CM

## 2020-12-29 DIAGNOSIS — Z11.3 SCREENING EXAMINATION FOR STD (SEXUALLY TRANSMITTED DISEASE): ICD-10-CM

## 2020-12-29 PROCEDURE — 87591 N.GONORRHOEAE DNA AMP PROB: CPT | Performed by: OBSTETRICS & GYNECOLOGY

## 2020-12-29 PROCEDURE — G0145 SCR C/V CYTO,THINLAYER,RESCR: HCPCS | Performed by: OBSTETRICS & GYNECOLOGY

## 2020-12-29 PROCEDURE — 99395 PREV VISIT EST AGE 18-39: CPT | Performed by: OBSTETRICS & GYNECOLOGY

## 2020-12-29 PROCEDURE — 87491 CHLMYD TRACH DNA AMP PROBE: CPT | Performed by: OBSTETRICS & GYNECOLOGY

## 2021-01-01 LAB
C TRACH DNA SPEC QL NAA+PROBE: NEGATIVE
N GONORRHOEA DNA SPEC QL NAA+PROBE: NEGATIVE

## 2021-01-04 ENCOUNTER — CLINICAL SUPPORT (OUTPATIENT)
Dept: OBGYN CLINIC | Facility: CLINIC | Age: 26
End: 2021-01-04
Payer: COMMERCIAL

## 2021-01-04 VITALS
TEMPERATURE: 97.8 F | HEIGHT: 67 IN | HEART RATE: 80 BPM | BODY MASS INDEX: 27.91 KG/M2 | WEIGHT: 177.8 LBS | DIASTOLIC BLOOD PRESSURE: 78 MMHG | SYSTOLIC BLOOD PRESSURE: 122 MMHG

## 2021-01-04 DIAGNOSIS — Z23 NEED FOR VACCINATION: Primary | ICD-10-CM

## 2021-01-04 LAB
LAB AP GYN PRIMARY INTERPRETATION: NORMAL
Lab: NORMAL

## 2021-01-04 PROCEDURE — 90651 9VHPV VACCINE 2/3 DOSE IM: CPT

## 2021-01-04 PROCEDURE — 90471 IMMUNIZATION ADMIN: CPT

## 2021-03-03 PROCEDURE — 88305 TISSUE EXAM BY PATHOLOGIST: CPT | Performed by: STUDENT IN AN ORGANIZED HEALTH CARE EDUCATION/TRAINING PROGRAM

## 2021-03-04 ENCOUNTER — LAB REQUISITION (OUTPATIENT)
Dept: LAB | Facility: HOSPITAL | Age: 26
End: 2021-03-04
Payer: COMMERCIAL

## 2021-03-04 DIAGNOSIS — D48.5 NEOPLASM OF UNCERTAIN BEHAVIOR OF SKIN: ICD-10-CM

## 2021-03-07 ENCOUNTER — IMMUNIZATIONS (OUTPATIENT)
Dept: FAMILY MEDICINE CLINIC | Facility: HOSPITAL | Age: 26
End: 2021-03-07

## 2021-03-07 DIAGNOSIS — Z23 ENCOUNTER FOR IMMUNIZATION: Primary | ICD-10-CM

## 2021-03-07 PROCEDURE — 0001A SARS-COV-2 / COVID-19 MRNA VACCINE (PFIZER-BIONTECH) 30 MCG: CPT

## 2021-03-07 PROCEDURE — 91300 SARS-COV-2 / COVID-19 MRNA VACCINE (PFIZER-BIONTECH) 30 MCG: CPT

## 2021-03-28 ENCOUNTER — IMMUNIZATIONS (OUTPATIENT)
Dept: FAMILY MEDICINE CLINIC | Facility: HOSPITAL | Age: 26
End: 2021-03-28

## 2021-03-28 DIAGNOSIS — Z23 ENCOUNTER FOR IMMUNIZATION: Primary | ICD-10-CM

## 2021-03-28 PROCEDURE — 91300 SARS-COV-2 / COVID-19 MRNA VACCINE (PFIZER-BIONTECH) 30 MCG: CPT

## 2021-03-28 PROCEDURE — 0002A SARS-COV-2 / COVID-19 MRNA VACCINE (PFIZER-BIONTECH) 30 MCG: CPT

## 2021-04-07 ENCOUNTER — TELEPHONE (OUTPATIENT)
Dept: OBGYN CLINIC | Facility: CLINIC | Age: 26
End: 2021-04-07

## 2021-04-07 DIAGNOSIS — K76.89 FOCAL NODULAR HYPERPLASIA OF LIVER: Primary | ICD-10-CM

## 2021-04-07 DIAGNOSIS — Z01.812 PRE-PROCEDURE LAB EXAM: ICD-10-CM

## 2021-04-07 NOTE — TELEPHONE ENCOUNTER
Goldy called regarding patients upcoming MRI on 4/12/21, states she will need a BUN/Creatinine done prior to study  Could this order but put in? Imagining center will call patient to have done

## 2021-04-09 ENCOUNTER — APPOINTMENT (OUTPATIENT)
Dept: LAB | Facility: HOSPITAL | Age: 26
End: 2021-04-09
Payer: COMMERCIAL

## 2021-04-09 ENCOUNTER — TRANSCRIBE ORDERS (OUTPATIENT)
Dept: LAB | Facility: HOSPITAL | Age: 26
End: 2021-04-09

## 2021-04-09 ENCOUNTER — APPOINTMENT (OUTPATIENT)
Dept: LAB | Facility: HOSPITAL | Age: 26
End: 2021-04-09
Attending: OBSTETRICS & GYNECOLOGY
Payer: COMMERCIAL

## 2021-04-09 DIAGNOSIS — Z00.8 HEALTH EXAMINATION IN POPULATION SURVEY: ICD-10-CM

## 2021-04-09 DIAGNOSIS — Z00.8 HEALTH EXAMINATION IN POPULATION SURVEY: Primary | ICD-10-CM

## 2021-04-09 LAB
ANION GAP SERPL CALCULATED.3IONS-SCNC: 9 MMOL/L (ref 4–13)
BUN SERPL-MCNC: 8 MG/DL (ref 5–25)
CALCIUM SERPL-MCNC: 9.4 MG/DL (ref 8.3–10.1)
CHLORIDE SERPL-SCNC: 103 MMOL/L (ref 100–108)
CHOLEST SERPL-MCNC: 160 MG/DL (ref 50–200)
CO2 SERPL-SCNC: 25 MMOL/L (ref 21–32)
CREAT SERPL-MCNC: 0.76 MG/DL (ref 0.6–1.3)
EST. AVERAGE GLUCOSE BLD GHB EST-MCNC: 108 MG/DL
GFR SERPL CREATININE-BSD FRML MDRD: 109 ML/MIN/1.73SQ M
GLUCOSE P FAST SERPL-MCNC: 75 MG/DL (ref 65–99)
HBA1C MFR BLD: 5.4 %
HDLC SERPL-MCNC: 57 MG/DL
LDLC SERPL CALC-MCNC: 87 MG/DL (ref 0–100)
NONHDLC SERPL-MCNC: 103 MG/DL
POTASSIUM SERPL-SCNC: 4.1 MMOL/L (ref 3.5–5.3)
SODIUM SERPL-SCNC: 137 MMOL/L (ref 136–145)
TRIGL SERPL-MCNC: 79 MG/DL

## 2021-04-09 PROCEDURE — 80048 BASIC METABOLIC PNL TOTAL CA: CPT

## 2021-04-09 PROCEDURE — 80061 LIPID PANEL: CPT

## 2021-04-09 PROCEDURE — 83036 HEMOGLOBIN GLYCOSYLATED A1C: CPT

## 2021-04-09 PROCEDURE — 36415 COLL VENOUS BLD VENIPUNCTURE: CPT

## 2021-04-12 ENCOUNTER — HOSPITAL ENCOUNTER (OUTPATIENT)
Dept: MRI IMAGING | Facility: HOSPITAL | Age: 26
Discharge: HOME | End: 2021-04-12
Attending: OBSTETRICS & GYNECOLOGY
Payer: COMMERCIAL

## 2021-04-12 DIAGNOSIS — K76.89 FOCAL NODULAR HYPERPLASIA OF LIVER: ICD-10-CM

## 2021-04-12 PROCEDURE — 74183 MRI ABD W/O CNTR FLWD CNTR: CPT

## 2021-04-12 PROCEDURE — G1004 CDSM NDSC: HCPCS

## 2021-04-12 PROCEDURE — A9581 GADOXETATE DISODIUM INJ: HCPCS | Performed by: OBSTETRICS & GYNECOLOGY

## 2021-04-12 RX ADMIN — GADOXETATE DISODIUM 8 ML: 181.43 INJECTION, SOLUTION INTRAVENOUS at 18:12

## 2021-05-06 ENCOUNTER — CLINICAL SUPPORT (OUTPATIENT)
Dept: OBGYN CLINIC | Facility: CLINIC | Age: 26
End: 2021-05-06
Payer: COMMERCIAL

## 2021-05-06 VITALS
SYSTOLIC BLOOD PRESSURE: 128 MMHG | BODY MASS INDEX: 27.88 KG/M2 | HEART RATE: 99 BPM | TEMPERATURE: 97.5 F | WEIGHT: 177.6 LBS | DIASTOLIC BLOOD PRESSURE: 78 MMHG | HEIGHT: 67 IN

## 2021-05-06 DIAGNOSIS — Z23 NEED FOR VACCINATION: Primary | ICD-10-CM

## 2021-05-06 PROCEDURE — 96372 THER/PROPH/DIAG INJ SC/IM: CPT | Performed by: OBSTETRICS & GYNECOLOGY

## 2021-05-06 PROCEDURE — 90651 9VHPV VACCINE 2/3 DOSE IM: CPT | Performed by: OBSTETRICS & GYNECOLOGY

## 2021-05-20 ENCOUNTER — TELEPHONE (OUTPATIENT)
Dept: HEMATOLOGY ONCOLOGY | Facility: CLINIC | Age: 26
End: 2021-05-20

## 2021-05-20 NOTE — TELEPHONE ENCOUNTER
Geri fontana rcd an email from patient that she was interested in family cancer risk evaluation  I left pt a voice mail to call the [facility] AT [place] to schedule this appointment

## 2021-06-03 NOTE — TELEPHONE ENCOUNTER
LM #3 for pt to call the [facility] AT [place] if she wishes to schedule this appointment

## 2021-06-17 ENCOUNTER — APPOINTMENT (OUTPATIENT)
Dept: URGENT CARE | Facility: CLINIC | Age: 26
End: 2021-06-17
Payer: OTHER MISCELLANEOUS

## 2021-06-17 PROCEDURE — 99283 EMERGENCY DEPT VISIT LOW MDM: CPT

## 2021-06-17 PROCEDURE — G0382 LEV 3 HOSP TYPE B ED VISIT: HCPCS

## 2021-06-25 ENCOUNTER — APPOINTMENT (OUTPATIENT)
Dept: URGENT CARE | Facility: CLINIC | Age: 26
End: 2021-06-25
Payer: OTHER MISCELLANEOUS

## 2021-06-25 PROCEDURE — 99213 OFFICE O/P EST LOW 20 MIN: CPT

## 2021-06-28 ENCOUNTER — EVALUATION (OUTPATIENT)
Dept: PHYSICAL THERAPY | Facility: CLINIC | Age: 26
End: 2021-06-28
Payer: OTHER MISCELLANEOUS

## 2021-06-28 DIAGNOSIS — M25.521 RIGHT ELBOW PAIN: Primary | ICD-10-CM

## 2021-06-28 PROCEDURE — 97161 PT EVAL LOW COMPLEX 20 MIN: CPT | Performed by: PHYSICAL THERAPIST

## 2021-06-28 PROCEDURE — 97112 NEUROMUSCULAR REEDUCATION: CPT | Performed by: PHYSICAL THERAPIST

## 2021-06-28 NOTE — PROGRESS NOTES
PT Evaluation     Today's date: 2021  Patient name: Epi Patton  : 1995  MRN: [id number]  Referring provider: Aminata Robb*  Dx:   Encounter Diagnosis     ICD-10-CM    1  Right elbow pain  M25 521        Start Time: 935  Stop Time:   Total time in clinic (min): 40 minutes    Assessment  Assessment details: 32year old female patient reports to PT with acute on chronic R elbow pain  No red flags noted and patient denies numbness/tingling  Patient cervical region seems to be clear as patient has full, equal, and painless cervical AROM  Patient symptoms correlate with R elbow lateral and medial epicondylitis with radial tunnel syndrome based on subjective and objective findings  Desk/work space and lifting ergonomics reviewed  Patient will benefit from skilled PT services to address current impairments and functional limitations to help patient return to her PLOF  Impairments: abnormal movement, activity intolerance, impaired physical strength, lacks appropriate home exercise program, pain with function and poor body mechanics    Symptom irritability: moderateUnderstanding of Dx/Px/POC: good   Prognosis: good    Goals  STG  1  Patient will be independent with completion of HEP throughout therapy  2  Patient will have at worst 5/10 pain so patient can fall asleep with less discomfort in 3 weeks  LTG  1  Patient will type and use her mouse without increase in symptoms in 4 weeks  2  Patient will  objects with her R hand without increase in symptoms so she can complete her normal work duties in 6 weeks       Plan  Patient would benefit from: skilled physical therapy  Planned therapy interventions: joint mobilization, manual therapy, abdominal trunk stabilization, home exercise program, functional ROM exercises, flexibility, neuromuscular re-education, patient education, strengthening, stretching, therapeutic activities and therapeutic exercise  Frequency: 2x week  Duration in weeks: 6  Treatment plan discussed with: patient        Subjective Evaluation    History of Present Illness  Mechanism of injury: Patient reports with R elbow pain that started about 2 weeks ago  Notes always have had discomfort but when she went home and rested it went away  This time it didn't go away  Patient reports has pain in R lateral elbow with pain/throbbing down her forearm and numbness/tingling in her R hand mainly in her ulnar side  Patient thinks she does have some decreased strength but hasn't noticed it as much lately but also notes she doesn't use it as much  Patient has difficulty falling asleep  Patient also has difficulty gripping  Patient notes over the weekend had nothing in her hand  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 her pain is already flared up  Patient notes she has had her pain/discomfort for at least 5 months but it wasn't severe and it started in her shoulder  Pain  Current pain rating: 3  At best pain ratin  At worst pain ratin  Quality: dull ache, throbbing and radiating  Aggravating factors: sitting    Treatments  Current treatment: physical therapy  Patient Goals  Patient goals for therapy: decreased pain, increased strength, return to sport/leisure activities and return to work          Objective     Palpation     Additional Palpation Details  Palpation to R radial tunnel provoked forearm and hand symptoms     Tenderness     Right Elbow   Tenderness in the lateral epicondyle and medial epicondyle  Right Wrist/Hand   Tenderness in the lateral epicondyle and medial epicondyle       Active Range of Motion     Left Elbow   Normal active range of motion    Right Elbow   Normal active range of motion    Strength/Myotome Testing     Additional Strength Details  R Wrist extension/flexion MMT painful     General Comments:      Cervical/Thoracic Comments  Cervical AROM full and painless into flex/ext/R rot/L rot/R sb/L sb Precautions: none       Manuals 6/28             L wrist extensor/flexor wad IASTM/STM             R wrist extension MWM                                        Neuro Re-Ed             Supine serratus punches  r            Serratus wall slides                                                    Desk ergonmics r            Lifting ergonimics r            Ther Ex             Wrist extensor/flexor isometrics r            Wrist extension/flexion stretch w/ elbow straight r                         MH pre             sidelying shoulder ER             Prone low trap retraction             Shoulder IR             Prone mid trap T's             Ther Activity                                       Gait Training                                       Modalities

## 2021-06-29 ENCOUNTER — OFFICE VISIT (OUTPATIENT)
Dept: PHYSICAL THERAPY | Facility: CLINIC | Age: 26
End: 2021-06-29
Payer: OTHER MISCELLANEOUS

## 2021-06-29 DIAGNOSIS — M25.521 RIGHT ELBOW PAIN: Primary | ICD-10-CM

## 2021-06-29 PROCEDURE — 97110 THERAPEUTIC EXERCISES: CPT | Performed by: PHYSICAL THERAPIST

## 2021-06-29 PROCEDURE — 97140 MANUAL THERAPY 1/> REGIONS: CPT | Performed by: PHYSICAL THERAPIST

## 2021-06-29 PROCEDURE — 97010 HOT OR COLD PACKS THERAPY: CPT | Performed by: PHYSICAL THERAPIST

## 2021-06-29 NOTE — PROGRESS NOTES
Daily Note     Today's date: 2021  Patient name: Alysa Bolanos  : 1995  MRN: [id number]  Referring provider: GIBSON Jarquin  Dx:   Encounter Diagnosis     ICD-10-CM    1  Right elbow pain  M25 521                   Subjective: Patient notes had rough night last night  Objective: See treatment diary below      Assessment: Tolerated treatment well  Patient would benefit from continued PT  Manuals focused on this visit  Patient had some radial neural tension  Plan: Progress treatment as tolerated         Precautions: none       Manuals            L wrist extensor/flexor wad IASTM/STM  MK           R wrist extension MWM   2x5 1 lb            Radial nerve glides  2x10                         Neuro Re-Ed             Supine serratus punches  r            Serratus wall slides                                                    Desk ergonmics r            Lifting ergonimics r            Ther Ex             Wrist extensor/flexor isometrics r            Wrist extension/flexion stretch w/ elbow straight r                         MH pre  10 min            sidelying shoulder ER             Prone low trap retraction             Shoulder IR             Prone mid trap T's             Ther Activity                                       Gait Training                                       Modalities

## 2021-07-02 ENCOUNTER — APPOINTMENT (OUTPATIENT)
Dept: URGENT CARE | Facility: CLINIC | Age: 26
End: 2021-07-02
Payer: OTHER MISCELLANEOUS

## 2021-07-02 PROCEDURE — 99214 OFFICE O/P EST MOD 30 MIN: CPT

## 2021-07-06 ENCOUNTER — OFFICE VISIT (OUTPATIENT)
Dept: PHYSICAL THERAPY | Facility: CLINIC | Age: 26
End: 2021-07-06
Payer: OTHER MISCELLANEOUS

## 2021-07-06 DIAGNOSIS — M25.521 RIGHT ELBOW PAIN: Primary | ICD-10-CM

## 2021-07-06 PROCEDURE — 97112 NEUROMUSCULAR REEDUCATION: CPT

## 2021-07-06 PROCEDURE — 97140 MANUAL THERAPY 1/> REGIONS: CPT

## 2021-07-06 NOTE — PROGRESS NOTES
Daily Note     Today's date: 2021  Patient name: Aramis Osborne  : 1995  MRN: [id number]  Referring provider: GIBSON Schwartz  Dx:   Encounter Diagnosis     ICD-10-CM    1  Right elbow pain  M25 521                 Subjective: Pt reports that she was sore over the weekend following last session but had pain today at work  Objective: See treatment diary below     Precautions: none     Manuals           L wrist extensor/flexor wad IASTM/STM  [address] SH & MFD          R wrist extension MWM   2x5 1 lb            Radial nerve glides  2x10                         Neuro Re-Ed             Supine serratus punches  r  5"x20          Serratus wall slides                                                    Desk ergonmics r            Lifting ergonimics r            Ther Ex             Wrist extensor/flexor isometrics r  Self assisted conc, weighted eccentric 2x10 1#          Wrist extension/flexion stretch w/ elbow straight r  20"x5 ea                                    sidelying shoulder ER   5"x20          Prone low trap retraction   5"x20          Shoulder IR   Strap stretch 20"x5          Prone mid trap T's   5"x20                                                                                        Modalities   6          MHP pre  10'                            Assessment: Tolerated treatment well  Patient demonstrated fatigue post treatment, exhibited good technique with therapeutic exercises and would benefit from continued PT    Plan: Continue per plan of care  Progress treatment as tolerated       Oral Kugel, PTA

## 2021-07-08 ENCOUNTER — OFFICE VISIT (OUTPATIENT)
Dept: PHYSICAL THERAPY | Facility: CLINIC | Age: 26
End: 2021-07-08
Payer: OTHER MISCELLANEOUS

## 2021-07-08 DIAGNOSIS — M25.521 RIGHT ELBOW PAIN: Primary | ICD-10-CM

## 2021-07-08 PROCEDURE — 97112 NEUROMUSCULAR REEDUCATION: CPT

## 2021-07-08 PROCEDURE — 97140 MANUAL THERAPY 1/> REGIONS: CPT

## 2021-07-08 NOTE — PROGRESS NOTES
Daily Note     Today's date: 2021  Patient name: Radha Farley  : 1995  MRN: [id number]  Referring provider: GIBSON Goncalves  Dx:   Encounter Diagnosis     ICD-10-CM    1  Right elbow pain  M25 521                 Subjective: Pt notes significant improvement in forearm and elbow symptoms but notes no change in R shoulder pain  Objective: See treatment diary below     Precautions: none     Manuals          L wrist extensor/flexor wad IASTM/STM  [address] SH & MFD SH         R wrist extension MWM   2x5 1 lb            Radial nerve glides  2x10            R scap musculature TPR/STM    SH         Neuro Re-Ed            Supine serratus punches  r  5"x20 5"x20 2#         Serratus wall slides                                                    Desk ergonomics r            Lifting ergonomics r            Ther Ex            Wrist extensor/flexor isometrics r  Self assisted conc, weighted eccentric 2x10 1# Self assisted conc, weighted eccentric 2x10 1#         Wrist extension/flexion stretch w/ elbow straight r  20"x5 ea 20"x5 ea         therabar pro & sup    Red 15x ea         Rubber band finger extension    20x         sidelying shoulder ER   5"x20 2# 5"x15  5"x5 no wt         Prone low trap retraction   5"x20 5"x20         Shoulder IR   Strap stretch 20"x5 [place]   5# 2x10         Prone mid trap T's   5"x20 5"x20                                                                                       Modalities            MHP pre  10'  10'                          Assessment: Tolerated treatment well  Patient demonstrated fatigue post treatment, exhibited good technique with therapeutic exercises and would benefit from continued PT    Plan: Continue per plan of care  Progress treatment as tolerated      Iva Ruiz PTA

## 2021-07-12 ENCOUNTER — OFFICE VISIT (OUTPATIENT)
Dept: PHYSICAL THERAPY | Facility: CLINIC | Age: 26
End: 2021-07-12
Payer: OTHER MISCELLANEOUS

## 2021-07-12 DIAGNOSIS — M25.521 RIGHT ELBOW PAIN: Primary | ICD-10-CM

## 2021-07-12 PROCEDURE — 97110 THERAPEUTIC EXERCISES: CPT

## 2021-07-12 PROCEDURE — 97112 NEUROMUSCULAR REEDUCATION: CPT

## 2021-07-12 PROCEDURE — 97140 MANUAL THERAPY 1/> REGIONS: CPT

## 2021-07-12 NOTE — PROGRESS NOTES
Daily Note     Today's date: 2021  Patient name: Bassem Tai  : 1995  MRN: [id number]  Referring provider: FABIOLA Weiss*  Dx:   Encounter Diagnosis     ICD-10-CM    1  Right elbow pain  M25 521                   Subjective: Pt states she does not have any current pain  Objective: See treatment diary below      Assessment: Tolerated treatment well  Swelling present near ulnar side of elbow, continues to note slight improvements regarding neural symptoms  Overall tolerated TE well  Patient demonstrated fatigue post treatment and would benefit from continued PT      Plan: Continue per plan of care        Precautions: none     Manuals         L wrist extensor/flexor wad IASTM/STM  [address] SH & MFD SH GF        R wrist extension MWM   2x5 1 lb            Radial nerve glides  2x10            R scap musculature TPR/STM    SH GF        Neuro Re-Ed           Supine serratus punches  r  5"x20 5"x20 2# 5"x20 2#        Serratus wall slides                                                    Desk ergonomics r            Lifting ergonomics r            Ther Ex           Wrist extensor/flexor isometrics r  Self assisted conc, weighted eccentric 2x10 1# Self assisted conc, weighted eccentric 2x10 1# Self assisted conc, weighted eccentric 2x10 1#        Wrist extension/flexion stretch w/ elbow straight r  20"x5 ea 20"x5 ea 20"x5 ea        therabar pro & sup    Red 15x ea Red 15x ea        Rubber band finger extension    20x 20x        sidelying shoulder ER   5"x20 2# 5"x15  5"x5 no wt 2# 2x10        Prone low trap retraction   5"x20 5"x20 5"x20        Shoulder IR   Strap stretch 20"x5 [place]   5# 2x10 [place]   5# 2x10        Prone mid trap T's   5"x20 5"x20 5"x20                                                                                      Modalities           MHP pre  10'  10' 10'

## 2021-07-13 ENCOUNTER — APPOINTMENT (OUTPATIENT)
Dept: PHYSICAL THERAPY | Facility: CLINIC | Age: 26
End: 2021-07-13
Payer: OTHER MISCELLANEOUS

## 2021-07-15 ENCOUNTER — OFFICE VISIT (OUTPATIENT)
Dept: PHYSICAL THERAPY | Facility: CLINIC | Age: 26
End: 2021-07-15
Payer: OTHER MISCELLANEOUS

## 2021-07-15 DIAGNOSIS — M25.521 RIGHT ELBOW PAIN: Primary | ICD-10-CM

## 2021-07-15 PROCEDURE — 97112 NEUROMUSCULAR REEDUCATION: CPT

## 2021-07-15 PROCEDURE — 97110 THERAPEUTIC EXERCISES: CPT

## 2021-07-15 PROCEDURE — 97140 MANUAL THERAPY 1/> REGIONS: CPT

## 2021-07-16 ENCOUNTER — APPOINTMENT (OUTPATIENT)
Dept: URGENT CARE | Facility: CLINIC | Age: 26
End: 2021-07-16
Payer: OTHER MISCELLANEOUS

## 2021-07-16 PROCEDURE — 99213 OFFICE O/P EST LOW 20 MIN: CPT

## 2021-07-19 ENCOUNTER — OFFICE VISIT (OUTPATIENT)
Dept: PHYSICAL THERAPY | Facility: CLINIC | Age: 26
End: 2021-07-19
Payer: OTHER MISCELLANEOUS

## 2021-07-19 DIAGNOSIS — M25.521 RIGHT ELBOW PAIN: Primary | ICD-10-CM

## 2021-07-19 PROCEDURE — 97140 MANUAL THERAPY 1/> REGIONS: CPT | Performed by: PHYSICAL THERAPIST

## 2021-07-19 PROCEDURE — 97010 HOT OR COLD PACKS THERAPY: CPT | Performed by: PHYSICAL THERAPIST

## 2021-07-19 PROCEDURE — 97110 THERAPEUTIC EXERCISES: CPT | Performed by: PHYSICAL THERAPIST

## 2021-07-19 NOTE — PROGRESS NOTES
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 and only had some minor soreness  Objective: See treatment diary below     Precautions: none     Manuals 7/8 7/12 7/15 7/19      L wrist extensor/flexor wad IASTM/STM SH GF SH MK      R wrist extension MWM           Radial nerve glides          R scap musculature TPR/STM SH GF SH       Neuro Re-Ed 7/8 7/12 7/15       Supine serratus punches  5"x20 2# 5"x20 2# 3# 5"x20 20x 5" holds 3 lbs       Serratus wall slides                                        Desk ergonomics          Lifting ergonomics          Ther Ex 7/8 7/12 7/15       Wrist extensor/flexor isometrics Self assisted conc, weighted eccentric 2x10 1# Self assisted conc, weighted eccentric 2x10 1# 1# 20x ea 2x12 1 lbs       Wrist extension/flexion stretch w/ elbow straight 20"x5 ea 20"x5 ea 20"x5 ea 5x 20" holds       therabar pro & sup Red 15x ea Red 15x ea Red 20x ea Red 2x12       Rubber band finger extension 20x 20x 20x 2x12       sidelying shoulder ER 2# 5"x15  5"x5 no wt 2# 2x10 3# 2x10 2x12 3 lbs       Prone low trap retraction 5"x20 5"x20 5"x20 2x12      Shoulder IR Dawn   5# 2x10 Dawn   5# 2x10 [place]   5# 2x10 2x12 dawn 5 lbs       Prone mid trap T's 5"x20 5"x20 5"x20 2x12                                                                  Modalities 7/8 7/12 7/15       MHP pre 10' 10' 10' 10 min                     Assessment: Tolerated treatment well  Patient demonstrated fatigue post treatment, exhibited good technique with therapeutic exercises and would benefit from continued PT  Plan: Continue per plan of care  Progress treatment as tolerated      Greg Yi, PT

## 2021-07-20 ENCOUNTER — APPOINTMENT (OUTPATIENT)
Dept: PHYSICAL THERAPY | Facility: CLINIC | Age: 26
End: 2021-07-20
Payer: OTHER MISCELLANEOUS

## 2021-07-22 ENCOUNTER — APPOINTMENT (OUTPATIENT)
Dept: PHYSICAL THERAPY | Facility: CLINIC | Age: 26
End: 2021-07-22
Payer: OTHER MISCELLANEOUS

## 2021-07-27 ENCOUNTER — OFFICE VISIT (OUTPATIENT)
Dept: PHYSICAL THERAPY | Facility: CLINIC | Age: 26
End: 2021-07-27
Payer: OTHER MISCELLANEOUS

## 2021-07-27 DIAGNOSIS — M25.521 RIGHT ELBOW PAIN: Primary | ICD-10-CM

## 2021-07-27 PROCEDURE — 97140 MANUAL THERAPY 1/> REGIONS: CPT

## 2021-07-27 PROCEDURE — 97110 THERAPEUTIC EXERCISES: CPT

## 2021-07-27 NOTE — PROGRESS NOTES
Daily Note     Today's date: 2021  Patient name: Darren Garvin  : 1995  MRN: [id number]  Referring provider: GIBSON Espinoza  Dx:   Encounter Diagnosis     ICD-10-CM    1  Right elbow pain  M25 521                 Subjective: Pt reports overall improvement in forearm pain but notes pain and numbness in biceps tendon down to medial elbow  Objective: See treatment diary below     Precautions: none     Manuals 7/8 7/12 7/15 7/19 7/27     L wrist extensor/flexor wad IASTM/STM SH GF SH MK SH     R wrist extension MWM           Radial nerve glides          R scap musculature TPR/STM SH GF [facility]  SH     Neuro Re-Ed 7/8 7/12 7/15  7/27     Supine serratus punches  5"x20 2# 5"x20 2# 3# 5"x20 20x 5" holds 3 lbs  3# 5"x20     Serratus wall slides                    Ther Ex 7/8 7/12 7/15  7/27     Wrist extensor/flexor isometrics Self assisted conc, weighted eccentric 2x10 1# Self assisted conc, weighted eccentric 2x10 1# 1# 20x ea 2x12 1 lbs  1# 2x12 ea     Wrist extension/flexion stretch w/ elbow straight 20"x5 ea 20"x5 ea 20"x5 ea 5x 20" holds  30"x3 ea     therabar pro & sup Red 15x ea Red 15x ea Red 20x ea Red 2x12  Red 2x12 ea     Rubber band finger extension 20x 20x 20x 2x12  2x12     sidelying shoulder ER 2# 5"x15  5"x5 no wt 2# 2x10 3# 2x10 2x12 3 lbs  3# 2x12     Prone low trap retraction 5"x20 5"x20 5"x20 2x12 2x12     [place] shoulder IR 5# 2x10 5# 2x10 5# 2x10 2x12 5 lbs  6# 2x12     Prone mid trap T's 5"x20 5"x20 5"x20 2x12 2x12                                                                 Modalities 7/8 7/12 7/15  7/27     MHP pre 10' 10' 10' 10 min                     Assessment: Tolerated treatment well  Patient demonstrated fatigue post treatment, exhibited good technique with therapeutic exercises and would benefit from continued PT    Plan: Continue per plan of care  Progress treatment as tolerated      Yady Rooney PTA

## 2021-07-29 ENCOUNTER — OFFICE VISIT (OUTPATIENT)
Dept: PHYSICAL THERAPY | Facility: CLINIC | Age: 26
End: 2021-07-29
Payer: OTHER MISCELLANEOUS

## 2021-07-29 ENCOUNTER — APPOINTMENT (OUTPATIENT)
Dept: PHYSICAL THERAPY | Facility: CLINIC | Age: 26
End: 2021-07-29
Payer: OTHER MISCELLANEOUS

## 2021-07-29 DIAGNOSIS — M25.521 RIGHT ELBOW PAIN: Primary | ICD-10-CM

## 2021-07-29 PROCEDURE — 97140 MANUAL THERAPY 1/> REGIONS: CPT

## 2021-07-29 PROCEDURE — 97110 THERAPEUTIC EXERCISES: CPT

## 2021-07-29 NOTE — PROGRESS NOTES
Daily Note     Today's date: 2021  Patient name: Mattie Noel  : 1995  MRN: [id number]  Referring provider: GIBSON He  Dx:   Encounter Diagnosis     ICD-10-CM    1  Right elbow pain  M25 521                 Subjective: Pt reports that her arm was "rock hard" and sore at the elbow yesterday  TTP in medial epicondyle and triceps insertion as well as over biceps tendon  Objective: See treatment diary below     Precautions: none     Manuals 7/8 7/12 7/15 7/19 7/27 7/29    L wrist extensor/flexor wad IASTM/STM SH GF SH MK SH SH    R wrist extension MWM           Radial nerve glides          R scap musculature TPR/STM SH GF [facility]    Neuro Re-Ed 7/8 7/12 7/15  7/27 7/29    Supine serratus punches  5"x20 2# 5"x20 2# 3# 5"x20 20x 5" holds 3 lbs  3# 5"x20 3# 5"x20     Serratus wall slides                    Ther Ex 7/8 7/12 7/15  7/27 7/29    Wrist extensor/flexor isometrics Self assisted conc, weighted eccentric 2x10 1# Self assisted conc, weighted eccentric 2x10 1# 1# 20x ea 2x12 1 lbs  1# 2x12 ea 1# 2x15 ea    Wrist extension/flexion stretch w/ elbow straight 20"x5 ea 20"x5 ea 20"x5 ea 5x 20" holds  30"x3 ea 10"x10 ea    therabar pro & sup Red 15x ea Red 15x ea Red 20x ea Red 2x12  Red 2x12 ea Green 2x10 ea    Rubber band finger extension 20x 20x 20x 2x12  2x12 2x15    sidelying shoulder ER 2# 5"x15  5"x5 no wt 2# 2x10 3# 2x10 2x12 3 lbs  3# 2x12 3# 5"x20    Prone low trap retraction 5"x20 5"x20 5"x20 2x12 2x12 2x15    [place] shoulder IR 5# 2x10 5# 2x10 5# 2x10 2x12 5 lbs  6# 2x12 7 5# 2x12    Prone mid trap T's 5"x20 5"x20 5"x20 2x12 2x12 2x15                                                                Modalities 7/8 7/12 7/15  7/27 7/29    [facility] pre 10' 10' 10' 10 min   10'                  Assessment: Tolerated treatment well   Patient demonstrated fatigue post treatment, exhibited good technique with therapeutic exercises and would benefit from continued PT    Plan: Continue per plan

## 2021-07-30 ENCOUNTER — APPOINTMENT (OUTPATIENT)
Dept: URGENT CARE | Facility: CLINIC | Age: 26
End: 2021-07-30
Payer: OTHER MISCELLANEOUS

## 2021-07-30 PROCEDURE — 99213 OFFICE O/P EST LOW 20 MIN: CPT

## 2021-08-02 ENCOUNTER — OFFICE VISIT (OUTPATIENT)
Dept: PHYSICAL THERAPY | Facility: CLINIC | Age: 26
End: 2021-08-02
Payer: OTHER MISCELLANEOUS
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
## 2021-08-02 DIAGNOSIS — M25.521 RIGHT ELBOW PAIN: Primary | ICD-10-CM

## 2021-08-02 DIAGNOSIS — S80.269A INSECT BITE OF KNEE, UNSPECIFIED LATERALITY, INITIAL ENCOUNTER: Primary | ICD-10-CM

## 2021-08-02 PROCEDURE — 97110 THERAPEUTIC EXERCISES: CPT | Performed by: PHYSICAL THERAPIST

## 2021-08-02 PROCEDURE — 97140 MANUAL THERAPY 1/> REGIONS: CPT | Performed by: PHYSICAL THERAPIST

## 2021-08-02 RX ORDER — DOXYCYCLINE HYCLATE 100 MG
100 TABLET ORAL 2 TIMES DAILY
Qty: 28 TABLET | Refills: 0 | Status: SHIPPED | OUTPATIENT
Start: 2021-08-02 | End: 2021-08-16

## 2021-08-02 NOTE — PROGRESS NOTES
Daily Note     Today's date: 2021  Patient name: Catrachita Rubi  : 1995  MRN: [id number]  Referring provider: GIBSON Luna  Dx:   Encounter Diagnosis     ICD-10-CM    1  Right elbow pain  M25 521                 Subjective: Patient reports she is bruised from manuals last visit but her forearm and shoulder are feeling good  Objective: See treatment diary below     Precautions: none     Manuals 7/15 7/19 7/27 7/29 8/2   L wrist extensor/flexor wad IASTM/STM SH [address] SH SH MK   R wrist extension MWM         Radial nerve glides        R scap musculature TPR/STM [facility]    Neuro Re-Ed 7/15  7/27 7/29    Supine serratus punches  3# 5"x20 20x 5" holds 3 lbs  3# 5"x20 3# 5"x20  20x 5" holds 4 lbs    Serratus wall slides                Ther Ex 7/15  7/27 7/29    Wrist extensor/flexor isometrics 1# 20x ea 2x12 1 lbs  1# 2x12 ea 1# 2x15 ea 2x15 1 lb each    Wrist extension/flexion stretch w/ elbow straight 20"x5 ea 5x 20" holds  30"x3 ea 10"x10 ea -   therabar pro & sup Red 20x ea Red 2x12  Red 2x12 ea Green 2x10 ea 2x12 grn each    Rubber band finger extension 20x 2x12  2x12 2x15 2x15    sidelying shoulder ER 3# 2x10 2x12 3 lbs  3# 2x12 3# 5"x20 3x10 3 lbs    Prone low trap retraction 5"x20 2x12 2x12 2x15 2x15    [place] shoulder IR 5# 2x10 2x12 5 lbs  6# 2x12 7 5# 2x12 3x10 7 5 lbs    Prone mid trap T's 5"x20 2x12 2x12 2x15 2x15                                                    Modalities 7/15  7/27 7/29    MHP pre 10' 10 min   10'                Assessment: Tolerated treatment well  Patient demonstrated fatigue post treatment, exhibited good technique with therapeutic exercises and would benefit from continued PT    Plan: Continue per plan of care  Progress treatment as tolerated      Miguel Mealing, PT

## 2021-08-05 ENCOUNTER — OFFICE VISIT (OUTPATIENT)
Dept: PHYSICAL THERAPY | Facility: CLINIC | Age: 26
End: 2021-08-05
Payer: OTHER MISCELLANEOUS

## 2021-08-05 DIAGNOSIS — M25.521 RIGHT ELBOW PAIN: Primary | ICD-10-CM

## 2021-08-05 PROCEDURE — 97110 THERAPEUTIC EXERCISES: CPT

## 2021-08-05 PROCEDURE — 97140 MANUAL THERAPY 1/> REGIONS: CPT

## 2021-08-05 NOTE — PROGRESS NOTES
Daily Note     Today's date: 2021  Patient name: Miryam Girard  : 1995  MRN: [id number]  Referring provider: GIBSON Barajas  Dx:   Encounter Diagnosis     ICD-10-CM    1  Right elbow pain  M25 521                   Subjective: Pt reports decrease in pain over past week  Objective: See treatment diary below     Precautions: none     Manuals 7/15 7/19 7/27 7/29 8/2 8/5     L wrist extensor/flexor wad IASTM/STM SH [address] SH [address] SH     R wrist extension MWM            Radial nerve glides           R scap musculature TPR/STM [address]  SH [address]  [address]     Neuro Re-Ed 7/15  7/27 7/29  85     Supine serratus punches  3# 5"x20 20x 5" holds 3 lbs  3# 5"x20 3# 5"x20  20x 5" holds 4 lbs  4# 5"x20     Serratus wall slides                      Ther Ex 7/15  7/27 7/29  8/5     Wrist extensor/flexor isometrics 1# 20x ea 2x12 1 lbs  1# 2x12 ea 1# 2x15 ea 2x15 1 lb each  2# 2x10     Wrist extension/flexion stretch w/ elbow straight 20"x5 ea 5x 20" holds  30"x3 ea 10"x10 ea - -     therabar pro & sup Red 20x ea Red 2x12  Red 2x12 ea Green 2x10 ea 2x12 grn each  2x12 grn ea     Rubber band finger extension 20x 2x12  2x12 2x15 2x15  2x15     sidelying shoulder ER 3# 2x10 2x12 3 lbs  3# 2x12 3# 5"x20 3x10 3 lbs  4# 5"x20     Prone low trap retraction 5"x20 2x12 2x12 2x15 2x15  2x15     [place] shoulder IR 5# 2x10 2x12 5 lbs  6# 2x12 7 5# 2x12 3x10 7 5 lbs  8 5# 2x12     Prone mid trap T's 5"x20 2x12 2x12 2x15 2x15  2x15                                                                       Modalities 7/15  7/27 7/29  8/5     MHP pre 10' 10 min   10'                      Assessment: Tolerated treatment well  Patient demonstrated fatigue post treatment, exhibited good technique with therapeutic exercises and would benefit from continued PT  Plan: Continue per plan of care  Progress treatment as tolerated      Marylou Macias PTA

## 2021-08-10 ENCOUNTER — OFFICE VISIT (OUTPATIENT)
Dept: PHYSICAL THERAPY | Facility: CLINIC | Age: 26
End: 2021-08-10
Payer: OTHER MISCELLANEOUS

## 2021-08-10 DIAGNOSIS — M25.521 RIGHT ELBOW PAIN: Primary | ICD-10-CM

## 2021-08-10 PROCEDURE — 97112 NEUROMUSCULAR REEDUCATION: CPT

## 2021-08-10 PROCEDURE — 97110 THERAPEUTIC EXERCISES: CPT

## 2021-08-10 PROCEDURE — 97140 MANUAL THERAPY 1/> REGIONS: CPT

## 2021-08-10 NOTE — PROGRESS NOTES
Daily Note     Today's date: 8/10/2021  Patient name: Bassem Tai  : 1995  MRN: [id number]  Referring provider: FABIOLA Weiss*  Dx:   Encounter Diagnosis     ICD-10-CM    1  Right elbow pain  M25 521                 Subjective: Pt states that she has been feeling much better and no longer notices pain when scanning  Objective: See treatment diary below     Precautions: none     Manuals 7/15 7/19 7/27 7/29 8/2 8/5 8/10    L wrist extensor/flexor wad IASTM/STM SH [address] SH    R wrist extension MWM            Radial nerve glides           R scap musculature TPR/STM [address]  SH [address]  [address]    Neuro Re-Ed 7/15  7/27 7/29  8 8/10    Supine serratus punches  3# 5"x20 20x 5" holds 3 lbs  3# 5"x20 3# 5"x20  20x 5" holds 4 lbs  4# 5"x20 4# 5"x20 5#   Serratus wall slides                      Ther Ex 7/15  7/27 7/29  8/5 8/10    Wrist ext/flexor concentric 1# 20x ea 2x12 1 lbs  1# 2x12 ea 1# 2x15 ea 2x15 1 lb each  2# 2x10 2# 2x10    Wrist extension/flexion stretch w/ elbow straight 20"x5 ea 5x 20" holds  30"x3 ea 10"x10 ea - - 10"x10    therabar pro & sup Red 20x ea Red 2x12  Red 2x12 ea Green 2x10 ea 2x12 grn each  2x12 grn ea 2x12 grn ea    Rubber band finger extension 20x 2x12  2x12 2x15 2x15  2x15 2x15    sidelying shoulder ER 3# 2x10 2x12 3 lbs  3# 2x12 3# 5"x20 3x10 3 lbs  4# 5"x20 4# 5"x20    Prone low trap retraction 5"x20 2x12 2x12 2x15 2x15  2x15 2x15    [place] shoulder IR 5# 2x10 2x12 5 lbs  6# 2x12 7 5# 2x12 3x10 7 5 lbs  8 5# 2x12 9# 2x12    Prone mid trap T's 5"x20 2x12 2x12 2x15 2x15  2x15 2x15                          Modalities 7/15  7/27 7/29  8/5 8/10    MHP pre 10' 10 min   10'   10' pre                   Assessment: Tolerated treatment well  Patient demonstrated fatigue post treatment, exhibited good technique with therapeutic exercises and would benefit from continued PT    Plan: Continue per plan of care  Progress treatment as tolerated      Celeste Woodward, JAYSON

## 2021-08-12 ENCOUNTER — APPOINTMENT (OUTPATIENT)
Dept: PHYSICAL THERAPY | Facility: CLINIC | Age: 26
End: 2021-08-12
Payer: OTHER MISCELLANEOUS

## 2021-08-17 ENCOUNTER — APPOINTMENT (OUTPATIENT)
Dept: PHYSICAL THERAPY | Facility: CLINIC | Age: 26
End: 2021-08-17
Payer: OTHER MISCELLANEOUS

## 2021-08-19 ENCOUNTER — OFFICE VISIT (OUTPATIENT)
Dept: PHYSICAL THERAPY | Facility: CLINIC | Age: 26
End: 2021-08-19
Payer: OTHER MISCELLANEOUS

## 2021-08-19 DIAGNOSIS — M25.521 RIGHT ELBOW PAIN: Primary | ICD-10-CM

## 2021-08-19 PROCEDURE — 97112 NEUROMUSCULAR REEDUCATION: CPT

## 2021-08-19 PROCEDURE — 97110 THERAPEUTIC EXERCISES: CPT

## 2021-08-19 PROCEDURE — 97140 MANUAL THERAPY 1/> REGIONS: CPT

## 2021-08-19 NOTE — PROGRESS NOTES
Daily Note     Today's date: 2021  Patient name: Nafisa Mckeon  : 1995  MRN: [id number]  Referring provider: GIBSON Strauss  Dx:   Encounter Diagnosis     ICD-10-CM    1  Right elbow pain  M25 521                 Subjective: Pt reports that her arm has been hurting with scanning and that yesterday was a "rough day"  TTP and increased scar tissue noted in distal extensor carpi radialis  Objective: See treatment diary below     Precautions: none     Manuals 8/2 8/5 8/10 8/19      L wrist extensor/flexor wad IASTM/STM [address] [facility]      R wrist extension MWM           Radial nerve glides          R scap musculature TPR/STM  [facility]      Neuro Re-Ed  8/5 8/10 8/19      Supine serratus punches  20x 5" holds 4 lbs  4# 5"x20 4# 5"x20 5# 5"x20      Serratus wall slides                    Ther Ex  8/5 8/10 8/19      Wrist ext/flexor concentric 2x15 1 lb each  2# 2x10 2# 2x10 2# 2x10 3#     Wrist extension/flexion stretch w/ elbow straight - - 10"x10 10"x10 ea      therabar pro & sup 2x12 grn each  2x12 grn ea 2x12 grn ea Green 2x15 ea      Rubber band finger extension 2x15  2x15 2x15 30x D/C     sidelying shoulder ER 3x10 3 lbs  4# 5"x20 4# 5"x20 5# 5"x20      Prone low trap retraction 2x15  2x15 2x15 -      [place] shoulder IR 3x10 7 5 lbs  8 5# 2x12 9# 2x12 9# 2x12      Prone mid trap T's 2x15  2x15 2x15 -                          Modalities  8/5 8/10 8/19      MHP pre   10' pre 10' pre                    Assessment: Tolerated treatment well  Patient demonstrated fatigue post treatment, exhibited good technique with therapeutic exercises and would benefit from continued PT  Plan: Continue per plan of care  Progress treatment as tolerated      oMni Jesus, JAYSON

## 2021-08-23 ENCOUNTER — APPOINTMENT (OUTPATIENT)
Dept: PHYSICAL THERAPY | Facility: CLINIC | Age: 26
End: 2021-08-23
Payer: OTHER MISCELLANEOUS

## 2021-08-26 ENCOUNTER — OFFICE VISIT (OUTPATIENT)
Dept: PHYSICAL THERAPY | Facility: CLINIC | Age: 26
End: 2021-08-26
Payer: OTHER MISCELLANEOUS

## 2021-08-26 DIAGNOSIS — M25.521 RIGHT ELBOW PAIN: Primary | ICD-10-CM

## 2021-08-26 PROCEDURE — 97140 MANUAL THERAPY 1/> REGIONS: CPT

## 2021-08-26 PROCEDURE — 97110 THERAPEUTIC EXERCISES: CPT

## 2021-08-26 NOTE — PROGRESS NOTES
Daily Note     Today's date: 2021  Patient name: Zaira Chang  : 1995  MRN: [id number]  Referring provider: GIBSON Russo  Dx:   Encounter Diagnosis     ICD-10-CM    1  Right elbow pain  M25 521                 Subjective: Pt reports significant improvement overall and would like to be discharged today  Objective: See treatment diary below     Precautions: none     Manuals 8/2 8/5 8/10 8/19 8/26     L wrist extensor/flexor wad IASTM/STM  [address] SH SH SH     R wrist extension MWM           Radial nerve glides          R scap musculature TPR/STM  [facility]      Neuro Re-Ed  8/5 8/10 8/19 8/26     Supine serratus punches  20x 5" holds 4 lbs  4# 5"x20 4# 5"x20 5# 5"x20      Serratus wall slides                    Ther Ex  8/5 8/10 8/19 8/26     Wrist ext/flexor concentric 2x15 1 lb each  2# 2x10 2# 2x10 2# 2x10 3# 2x10 ea     Wrist extension/flexion stretch w/ elbow straight - - 10"x10 10"x10 ea 10"x10 ea     therabar pro & sup 2x12 grn each  2x12 grn ea 2x12 grn ea Green 2x15 ea Green 2x10 ea     Rubber band finger extension 2x15  2x15 2x15 30x D/C     sidelying shoulder ER 3x10 3 lbs  4# 5"x20 4# 5"x20 5# 5"x20      Prone low trap retraction 2x15  2x15 2x15 - -     Dawn shoulder IR 3x10 7 5 lbs  8 5# 2x12 9# 2x12 9# 2x12      Prone mid trap T's 2x15  2x15 2x15 -                          Modalities  8/5 8/10 8/19 8/26     MHP pre   10' pre 10' pre 10' pre                   Assessment: Tolerated treatment well  Patient demonstrated fatigue post treatment and exhibited good technique with therapeutic exercises  Plan: D/C to HEP per  Jose Solomon, PT      Melo Levin PTA

## 2021-09-03 DIAGNOSIS — K21.9 GASTROESOPHAGEAL REFLUX DISEASE WITHOUT ESOPHAGITIS: Primary | ICD-10-CM

## 2021-09-03 DIAGNOSIS — K21.9 GASTROESOPHAGEAL REFLUX DISEASE WITHOUT ESOPHAGITIS: ICD-10-CM

## 2021-09-03 RX ORDER — PANTOPRAZOLE SODIUM 40 MG/1
40 TABLET, DELAYED RELEASE ORAL DAILY
Qty: 30 TABLET | Refills: 2 | Status: SHIPPED | OUTPATIENT
Start: 2021-09-03 | End: 2021-09-03 | Stop reason: SDUPTHER

## 2021-09-03 RX ORDER — PANTOPRAZOLE SODIUM 40 MG/1
40 TABLET, DELAYED RELEASE ORAL DAILY
Qty: 90 TABLET | Refills: 1 | Status: SHIPPED | OUTPATIENT
Start: 2021-09-03

## 2021-10-08 ENCOUNTER — TELEPHONE (OUTPATIENT)
Dept: INTERNAL MEDICINE CLINIC | Facility: CLINIC | Age: 26
End: 2021-10-08

## 2021-10-09 ENCOUNTER — NURSE TRIAGE (OUTPATIENT)
Dept: OTHER | Facility: OTHER | Age: 26
End: 2021-10-09

## 2021-10-09 DIAGNOSIS — Z20.828 SARS-ASSOCIATED CORONAVIRUS EXPOSURE: Primary | ICD-10-CM

## 2021-10-10 PROCEDURE — U0005 INFEC AGEN DETEC AMPLI PROBE: HCPCS | Performed by: PHYSICIAN ASSISTANT

## 2021-10-10 PROCEDURE — U0003 INFECTIOUS AGENT DETECTION BY NUCLEIC ACID (DNA OR RNA); SEVERE ACUTE RESPIRATORY SYNDROME CORONAVIRUS 2 (SARS-COV-2) (CORONAVIRUS DISEASE [COVID-19]), AMPLIFIED PROBE TECHNIQUE, MAKING USE OF HIGH THROUGHPUT TECHNOLOGIES AS DESCRIBED BY CMS-2020-01-R: HCPCS | Performed by: PHYSICIAN ASSISTANT

## 2021-11-03 ENCOUNTER — OFFICE VISIT (OUTPATIENT)
Dept: INTERNAL MEDICINE CLINIC | Facility: CLINIC | Age: 26
End: 2021-11-03
Payer: COMMERCIAL

## 2021-11-03 VITALS
OXYGEN SATURATION: 100 % | BODY MASS INDEX: 27.02 KG/M2 | WEIGHT: 172.13 LBS | HEART RATE: 115 BPM | HEIGHT: 67 IN | TEMPERATURE: 98.7 F

## 2021-11-03 DIAGNOSIS — F41.1 GAD (GENERALIZED ANXIETY DISORDER): Primary | ICD-10-CM

## 2021-11-03 DIAGNOSIS — Z13.0 SCREENING FOR DEFICIENCY ANEMIA: ICD-10-CM

## 2021-11-03 DIAGNOSIS — Z13.29 SCREENING FOR THYROID DISORDER: ICD-10-CM

## 2021-11-03 DIAGNOSIS — E55.9 VITAMIN D DEFICIENCY: ICD-10-CM

## 2021-11-03 DIAGNOSIS — M25.50 ARTHRALGIA, UNSPECIFIED JOINT: ICD-10-CM

## 2021-11-03 DIAGNOSIS — Z13.1 SCREENING FOR DIABETES MELLITUS: ICD-10-CM

## 2021-11-03 DIAGNOSIS — F43.21 GRIEF REACTION: ICD-10-CM

## 2021-11-03 DIAGNOSIS — Z13.220 SCREENING, LIPID: ICD-10-CM

## 2021-11-03 PROCEDURE — 99214 OFFICE O/P EST MOD 30 MIN: CPT | Performed by: PHYSICIAN ASSISTANT

## 2021-11-03 RX ORDER — BUSPIRONE HYDROCHLORIDE 10 MG/1
10 TABLET ORAL 3 TIMES DAILY
Qty: 90 TABLET | Refills: 0 | Status: SHIPPED | OUTPATIENT
Start: 2021-11-03

## 2021-11-03 RX ORDER — CLINDAMYCIN PHOSPHATE 10 UG/ML
LOTION TOPICAL
COMMUNITY
Start: 2021-08-16

## 2021-11-04 PROBLEM — U07.1 COVID-19: Status: RESOLVED | Noted: 2020-12-05 | Resolved: 2021-11-04

## 2021-11-04 PROBLEM — F43.20 GRIEF REACTION: Status: ACTIVE | Noted: 2021-11-04

## 2021-11-04 PROBLEM — F41.1 GAD (GENERALIZED ANXIETY DISORDER): Status: ACTIVE | Noted: 2021-11-04

## 2021-11-04 PROBLEM — F43.21 GRIEF REACTION: Status: ACTIVE | Noted: 2021-11-04

## 2021-11-08 ENCOUNTER — APPOINTMENT (OUTPATIENT)
Dept: LAB | Facility: MEDICAL CENTER | Age: 26
End: 2021-11-08
Payer: COMMERCIAL

## 2021-11-08 DIAGNOSIS — Z01.83 ENCOUNTER FOR BLOOD TYPING: Primary | ICD-10-CM

## 2021-11-08 DIAGNOSIS — M25.50 ARTHRALGIA, UNSPECIFIED JOINT: ICD-10-CM

## 2021-11-08 DIAGNOSIS — Z13.29 SCREENING FOR THYROID DISORDER: ICD-10-CM

## 2021-11-08 DIAGNOSIS — Z13.220 SCREENING, LIPID: ICD-10-CM

## 2021-11-08 DIAGNOSIS — Z13.1 SCREENING FOR DIABETES MELLITUS: ICD-10-CM

## 2021-11-08 DIAGNOSIS — E55.9 VITAMIN D DEFICIENCY: ICD-10-CM

## 2021-11-08 DIAGNOSIS — Z13.0 SCREENING FOR DEFICIENCY ANEMIA: ICD-10-CM

## 2021-11-08 LAB
25(OH)D3 SERPL-MCNC: 25.1 NG/ML (ref 30–100)
ALBUMIN SERPL BCP-MCNC: 4 G/DL (ref 3.5–5)
ALP SERPL-CCNC: 69 U/L (ref 46–116)
ALT SERPL W P-5'-P-CCNC: 23 U/L (ref 12–78)
ANION GAP SERPL CALCULATED.3IONS-SCNC: 7 MMOL/L (ref 4–13)
AST SERPL W P-5'-P-CCNC: 12 U/L (ref 5–45)
BASOPHILS # BLD AUTO: 0.04 THOUSANDS/ΜL (ref 0–0.1)
BASOPHILS NFR BLD AUTO: 1 % (ref 0–1)
BILIRUB SERPL-MCNC: 0.8 MG/DL (ref 0.2–1)
BUN SERPL-MCNC: 7 MG/DL (ref 5–25)
CALCIUM SERPL-MCNC: 9 MG/DL (ref 8.3–10.1)
CHLORIDE SERPL-SCNC: 104 MMOL/L (ref 100–108)
CHOLEST SERPL-MCNC: 158 MG/DL (ref 50–200)
CO2 SERPL-SCNC: 24 MMOL/L (ref 21–32)
CREAT SERPL-MCNC: 0.73 MG/DL (ref 0.6–1.3)
EOSINOPHIL # BLD AUTO: 0.08 THOUSAND/ΜL (ref 0–0.61)
EOSINOPHIL NFR BLD AUTO: 2 % (ref 0–6)
ERYTHROCYTE [DISTWIDTH] IN BLOOD BY AUTOMATED COUNT: 12.5 % (ref 11.6–15.1)
GFR SERPL CREATININE-BSD FRML MDRD: 114 ML/MIN/1.73SQ M
GLUCOSE P FAST SERPL-MCNC: 78 MG/DL (ref 65–99)
HCT VFR BLD AUTO: 42.1 % (ref 34.8–46.1)
HDLC SERPL-MCNC: 59 MG/DL
HGB BLD-MCNC: 13.9 G/DL (ref 11.5–15.4)
IMM GRANULOCYTES # BLD AUTO: 0.02 THOUSAND/UL (ref 0–0.2)
IMM GRANULOCYTES NFR BLD AUTO: 0 % (ref 0–2)
LDLC SERPL CALC-MCNC: 84 MG/DL (ref 0–100)
LYMPHOCYTES # BLD AUTO: 1.57 THOUSANDS/ΜL (ref 0.6–4.47)
LYMPHOCYTES NFR BLD AUTO: 30 % (ref 14–44)
MCH RBC QN AUTO: 30.1 PG (ref 26.8–34.3)
MCHC RBC AUTO-ENTMCNC: 33 G/DL (ref 31.4–37.4)
MCV RBC AUTO: 91 FL (ref 82–98)
MONOCYTES # BLD AUTO: 0.42 THOUSAND/ΜL (ref 0.17–1.22)
MONOCYTES NFR BLD AUTO: 8 % (ref 4–12)
NEUTROPHILS # BLD AUTO: 3.19 THOUSANDS/ΜL (ref 1.85–7.62)
NEUTS SEG NFR BLD AUTO: 59 % (ref 43–75)
NONHDLC SERPL-MCNC: 99 MG/DL
NRBC BLD AUTO-RTO: 0 /100 WBCS
PLATELET # BLD AUTO: 275 THOUSANDS/UL (ref 149–390)
PMV BLD AUTO: 10.5 FL (ref 8.9–12.7)
POTASSIUM SERPL-SCNC: 4 MMOL/L (ref 3.5–5.3)
PROT SERPL-MCNC: 8 G/DL (ref 6.4–8.2)
RBC # BLD AUTO: 4.62 MILLION/UL (ref 3.81–5.12)
SODIUM SERPL-SCNC: 135 MMOL/L (ref 136–145)
TRIGL SERPL-MCNC: 73 MG/DL
TSH SERPL DL<=0.05 MIU/L-ACNC: 1.73 UIU/ML (ref 0.36–3.74)
WBC # BLD AUTO: 5.32 THOUSAND/UL (ref 4.31–10.16)

## 2021-11-08 PROCEDURE — 36415 COLL VENOUS BLD VENIPUNCTURE: CPT

## 2021-11-08 PROCEDURE — 80061 LIPID PANEL: CPT

## 2021-11-08 PROCEDURE — 84443 ASSAY THYROID STIM HORMONE: CPT

## 2021-11-08 PROCEDURE — 82306 VITAMIN D 25 HYDROXY: CPT

## 2021-11-08 PROCEDURE — 85025 COMPLETE CBC W/AUTO DIFF WBC: CPT

## 2021-11-08 PROCEDURE — 86038 ANTINUCLEAR ANTIBODIES: CPT

## 2021-11-08 PROCEDURE — 80053 COMPREHEN METABOLIC PANEL: CPT

## 2021-11-10 LAB — RYE IGE QN: NEGATIVE

## 2021-12-03 ENCOUNTER — TELEPHONE (OUTPATIENT)
Dept: INTERNAL MEDICINE CLINIC | Facility: CLINIC | Age: 26
End: 2021-12-03

## 2021-12-04 ENCOUNTER — TELEPHONE (OUTPATIENT)
Dept: INTERNAL MEDICINE CLINIC | Facility: CLINIC | Age: 26
End: 2021-12-04

## 2022-01-04 DIAGNOSIS — B34.9 VIRAL INFECTION, UNSPECIFIED: Primary | ICD-10-CM

## 2022-01-04 PROCEDURE — 87636 SARSCOV2 & INF A&B AMP PRB: CPT | Performed by: PHYSICIAN ASSISTANT

## 2022-01-05 DIAGNOSIS — J02.0 STREP PHARYNGITIS: Primary | ICD-10-CM

## 2022-01-05 RX ORDER — AMOXICILLIN 500 MG/1
500 CAPSULE ORAL EVERY 8 HOURS SCHEDULED
Qty: 30 CAPSULE | Refills: 0 | Status: SHIPPED | OUTPATIENT
Start: 2022-01-05 | End: 2022-01-15

## 2022-01-18 ENCOUNTER — ANNUAL EXAM (OUTPATIENT)
Dept: OBGYN CLINIC | Facility: CLINIC | Age: 27
End: 2022-01-18
Payer: COMMERCIAL

## 2022-01-18 VITALS
DIASTOLIC BLOOD PRESSURE: 72 MMHG | BODY MASS INDEX: 26.53 KG/M2 | TEMPERATURE: 97.2 F | SYSTOLIC BLOOD PRESSURE: 124 MMHG | WEIGHT: 169 LBS | HEIGHT: 67 IN | HEART RATE: 83 BPM

## 2022-01-18 DIAGNOSIS — Z12.4 CERVICAL CANCER SCREENING: ICD-10-CM

## 2022-01-18 DIAGNOSIS — Z01.419 ENCNTR FOR GYN EXAM (GENERAL) (ROUTINE) W/O ABN FINDINGS: Primary | ICD-10-CM

## 2022-01-18 DIAGNOSIS — N90.89 VULVAR LESION: ICD-10-CM

## 2022-01-18 PROCEDURE — 87255 GENET VIRUS ISOLATE HSV: CPT | Performed by: OBSTETRICS & GYNECOLOGY

## 2022-01-18 PROCEDURE — S0612 ANNUAL GYNECOLOGICAL EXAMINA: HCPCS | Performed by: OBSTETRICS & GYNECOLOGY

## 2022-01-18 PROCEDURE — G0145 SCR C/V CYTO,THINLAYER,RESCR: HCPCS | Performed by: OBSTETRICS & GYNECOLOGY

## 2022-01-18 NOTE — PROGRESS NOTES
Assessment         Diagnoses and all orders for this visit:    Encntr for gyn exam (general) (routine) w/o abn findings    Cervical cancer screening  -     Liquid-based pap, screening             Plan        All questions answered  Await pap smear results  Contraception: condoms  Diagnosis explained in detail, including differential   Follow up in 1 year  Follow up as needed  Thin prep Pap smear  Patient advised most likely a m  Normal uterine bleeding secondary to recent use of B  Continue observation for  Patient with a vulvar lesion either consistent with a vulvar cyst or vulvar abscess that has now resolved  This was swabbed for HSV  Will call patient with results  She declines any previous history  No treatment indicated for now        Subjective      Rory Goyal is a 32 y o  female who presents for annual exam       Chief Complaint   Patient presents with    Gynecologic Exam     last pap 20  normal  Patient with c/o AUB  Concerns of bartholin cyst  Has some cramping during typical time of menstural cycle     She went off OCPs since 2021, got  in Sept, but not planning on pregnancy now, using condoms  H/o Bartholin cyst R, last week that drained spontaneously, (small pea) resolved with warm compress    She took plan B Dec 29 2021  She had AUB/breakthrough bleeding but has had normal cycles prior    LMP: 2022  : 2022      Last Pap: 20 h/o atypical cells    HPV vaccine completed:yes - 3 doses  Current contraception: condoms  History of abnormal Pap smear: yes - ASCUS  History of abnormal mammogram: no  Family history of uterine or ovarian cancer: no  Family history of breast cancer: no  Family history of colon cancer: no    Dad passed away from prostate, testicular with lung mets, leukemia    Menstrual History:  OB History        0    Para   0    Term   0       0    AB   0    Living   0       SAB   0    IAB   0    Ectopic   0    Multiple   0 Live Births   0                Menarche age: 15  No LMP recorded (lmp unknown)               Past Medical History:   Diagnosis Date    Asthma     exercise induced- not used often    GERD (gastroesophageal reflux disease)     Liver mass      Past Surgical History:   Procedure Laterality Date    KNEE SURGERY      TONSILLECTOMY  age 12    WISDOM TOOTH EXTRACTION       Family History   Problem Relation Age of Onset    No Known Problems Mother     Coronary artery disease Father     Testicular cancer Father     Heart attack Father     Royce's disease Father     Leukemia Father     No Known Problems Maternal Grandmother     No Known Problems Paternal Grandmother     Lupus Paternal Grandfather     No Known Problems Sister        Social History     Tobacco Use    Smoking status: Never Smoker    Smokeless tobacco: Never Used   Vaping Use    Vaping Use: Never used   Substance Use Topics    Alcohol use: Yes     Comment: socially    Drug use: No          Current Outpatient Medications:     albuterol (PROVENTIL HFA,VENTOLIN HFA) 90 mcg/act inhaler, Inhale 2 puffs every 6 (six) hours as needed for wheezing or shortness of breath, Disp: 1 Inhaler, Rfl: 5    clindamycin (CLEOCIN T) 1 % lotion, , Disp: , Rfl:     EPINEPHrine (EPIPEN) 0 3 mg/0 3 mL SOAJ, Inject 0 3 mL (0 3 mg total) into a muscle once for 1 dose, Disp: 0 6 mL, Rfl: 2    ergocalciferol (VITAMIN D2) 50,000 units, Take 1 capsule (50,000 Units total) by mouth once a week, Disp: 4 capsule, Rfl: 3    mometasone (NASONEX) 50 mcg/act nasal spray, 2 sprays into each nostril daily (Patient taking differently: 2 sprays into each nostril as needed ), Disp: 17 g, Rfl: 0    pantoprazole (PROTONIX) 40 mg tablet, Take 1 tablet (40 mg total) by mouth daily, Disp: 90 tablet, Rfl: 1    busPIRone (BUSPAR) 10 mg tablet, Take 1 tablet (10 mg total) by mouth 3 (three) times a day (Patient not taking: Reported on 1/18/2022 ), Disp: 90 tablet, Rfl: 0    Allergies Allergen Reactions    Levofloxacin Hives           Review of Systems   Constitutional: Negative  HENT: Negative  Eyes: Negative  Respiratory: Negative  Cardiovascular: Negative  Gastrointestinal: Negative  Endocrine: Negative  Genitourinary:        As noted in HPI   Musculoskeletal: Negative  Skin: Negative  Allergic/Immunologic: Negative  Neurological: Negative  Hematological: Negative  Psychiatric/Behavioral: Negative  /72 (BP Location: Right arm, Patient Position: Sitting, Cuff Size: Adult)   Pulse 83   Temp (!) 97 2 °F (36 2 °C) (Temporal)   Ht 5' 7" (1 702 m)   Wt 76 7 kg (169 lb)   LMP  (LMP Unknown)   BMI 26 47 kg/m²         Physical Exam  Constitutional:       Appearance: She is well-developed  Genitourinary:      Vulva, bladder and rectum normal       No lesions in the vagina  Genitourinary Comments:         Right Labia: No rash, tenderness, lesions, skin changes or Bartholin's cyst      Left Labia: No tenderness, lesions, skin changes, Bartholin's cyst or rash  No inguinal adenopathy present in the right or left side  No vaginal discharge, tenderness or bleeding  No vaginal prolapse present  No vaginal atrophy present  Right Adnexa: not tender, not full and no mass present  Left Adnexa: not tender, not full and no mass present  No cervical motion tenderness, friability, lesion or polyp  Uterus is not enlarged or tender  Pelvic exam was performed with patient in the lithotomy position  Rectum:      No external hemorrhoid  Breasts:      Right: No mass, nipple discharge, skin change or tenderness  Left: No mass, nipple discharge, skin change or tenderness  HENT:      Head: Normocephalic  Nose: Nose normal    Eyes:      Conjunctiva/sclera: Conjunctivae normal    Neck:      Thyroid: No thyromegaly  Cardiovascular:      Rate and Rhythm: Normal rate and regular rhythm        Heart sounds: Normal heart sounds  No murmur heard  Pulmonary:      Effort: Pulmonary effort is normal  No respiratory distress  Breath sounds: Normal breath sounds  No wheezing or rales  Abdominal:      General: There is no distension  Palpations: Abdomen is soft  There is no mass  Tenderness: There is no abdominal tenderness  There is no guarding or rebound  Musculoskeletal:         General: No tenderness  Cervical back: Neck supple  No muscular tenderness  Lymphadenopathy:      Cervical: No cervical adenopathy  Lower Body: No right inguinal adenopathy  No left inguinal adenopathy  Neurological:      Mental Status: She is alert and oriented to person, place, and time  Skin:     General: Skin is warm and dry     Psychiatric:         Mood and Affect: Mood normal          Behavior: Behavior normal

## 2022-01-18 NOTE — PATIENT INSTRUCTIONS

## 2022-01-20 LAB — HSV SPEC CULT: NORMAL

## 2022-01-21 LAB
LAB AP GYN PRIMARY INTERPRETATION: NORMAL
Lab: NORMAL

## 2022-01-25 DIAGNOSIS — R53.83 FATIGUE, UNSPECIFIED TYPE: ICD-10-CM

## 2022-01-25 RX ORDER — ERGOCALCIFEROL 1.25 MG/1
50000 CAPSULE ORAL WEEKLY
Qty: 4 CAPSULE | Refills: 0 | Status: SHIPPED | OUTPATIENT
Start: 2022-01-25 | End: 2022-03-06 | Stop reason: SDUPTHER

## 2022-02-07 ENCOUNTER — TELEPHONE (OUTPATIENT)
Dept: INTERNAL MEDICINE CLINIC | Facility: CLINIC | Age: 27
End: 2022-02-07

## 2022-02-07 NOTE — TELEPHONE ENCOUNTER
Called Pt and got her on the schedule for Friday for a rapid test  She is asking if you can end her a message on mychart stating that she should wait and isolate until day 5 to get tested  She states her manager says she is supposed to get tested day 4 and return day 5  She just wants your recommendation written out that she can show her manager  Please advise

## 2022-02-07 NOTE — TELEPHONE ENCOUNTER
----- Message from Kirkwood Dandy, PA-C sent at 2/7/2022  3:29 PM EST -----  Regarding: FW: Covid +  Please put on nurse schedule for Friday for rapid test, thanks  ----- Message -----  From: Dana Rodriguez  Sent: 2/7/2022   2:27 PM EST  To: Kirkwood Dandy, PA-C  Subject: FW: Covid +                                        ----- Message -----  From: Adelaida uHll  Sent: 2/7/2022  12:23 PM EST  To: [facility]  Subject: Covid +                                          Ansley Cobb come Friday on day 5 in order to return to work on Monday  Thanks Cipriano Stover!

## 2022-02-11 DIAGNOSIS — B34.9 VIRAL INFECTION, UNSPECIFIED: Primary | ICD-10-CM

## 2022-02-11 LAB
SARS-COV-2 AG UPPER RESP QL IA: POSITIVE
VALID CONTROL: ABNORMAL

## 2022-02-24 DIAGNOSIS — R59.0 POSTERIOR AURICULAR LYMPHADENOPATHY: Primary | ICD-10-CM

## 2022-02-28 ENCOUNTER — APPOINTMENT (OUTPATIENT)
Dept: LAB | Facility: CLINIC | Age: 27
End: 2022-02-28
Payer: COMMERCIAL

## 2022-02-28 DIAGNOSIS — R59.0 POSTERIOR AURICULAR LYMPHADENOPATHY: ICD-10-CM

## 2022-02-28 LAB
ALBUMIN SERPL BCP-MCNC: 3.9 G/DL (ref 3.5–5)
ALP SERPL-CCNC: 57 U/L (ref 46–116)
ALT SERPL W P-5'-P-CCNC: 18 U/L (ref 12–78)
ANION GAP SERPL CALCULATED.3IONS-SCNC: 0 MMOL/L (ref 4–13)
AST SERPL W P-5'-P-CCNC: 11 U/L (ref 5–45)
BASOPHILS # BLD AUTO: 0.03 THOUSANDS/ΜL (ref 0–0.1)
BASOPHILS NFR BLD AUTO: 1 % (ref 0–1)
BILIRUB SERPL-MCNC: 0.63 MG/DL (ref 0.2–1)
BUN SERPL-MCNC: 10 MG/DL (ref 5–25)
CALCIUM SERPL-MCNC: 9.5 MG/DL (ref 8.3–10.1)
CHLORIDE SERPL-SCNC: 109 MMOL/L (ref 100–108)
CO2 SERPL-SCNC: 29 MMOL/L (ref 21–32)
CREAT SERPL-MCNC: 0.84 MG/DL (ref 0.6–1.3)
CRP SERPL QL: <3 MG/L
EOSINOPHIL # BLD AUTO: 0.03 THOUSAND/ΜL (ref 0–0.61)
EOSINOPHIL NFR BLD AUTO: 1 % (ref 0–6)
ERYTHROCYTE [DISTWIDTH] IN BLOOD BY AUTOMATED COUNT: 12.5 % (ref 11.6–15.1)
GFR SERPL CREATININE-BSD FRML MDRD: 96 ML/MIN/1.73SQ M
GLUCOSE SERPL-MCNC: 86 MG/DL (ref 65–140)
HCT VFR BLD AUTO: 41.2 % (ref 34.8–46.1)
HGB BLD-MCNC: 13.1 G/DL (ref 11.5–15.4)
IMM GRANULOCYTES # BLD AUTO: 0.01 THOUSAND/UL (ref 0–0.2)
IMM GRANULOCYTES NFR BLD AUTO: 0 % (ref 0–2)
LYMPHOCYTES # BLD AUTO: 1.39 THOUSANDS/ΜL (ref 0.6–4.47)
LYMPHOCYTES NFR BLD AUTO: 28 % (ref 14–44)
MCH RBC QN AUTO: 29.4 PG (ref 26.8–34.3)
MCHC RBC AUTO-ENTMCNC: 31.8 G/DL (ref 31.4–37.4)
MCV RBC AUTO: 92 FL (ref 82–98)
MONOCYTES # BLD AUTO: 0.31 THOUSAND/ΜL (ref 0.17–1.22)
MONOCYTES NFR BLD AUTO: 6 % (ref 4–12)
NEUTROPHILS # BLD AUTO: 3.13 THOUSANDS/ΜL (ref 1.85–7.62)
NEUTS SEG NFR BLD AUTO: 64 % (ref 43–75)
NRBC BLD AUTO-RTO: 0 /100 WBCS
PLATELET # BLD AUTO: 263 THOUSANDS/UL (ref 149–390)
PMV BLD AUTO: 10.7 FL (ref 8.9–12.7)
POTASSIUM SERPL-SCNC: 4.4 MMOL/L (ref 3.5–5.3)
PROT SERPL-MCNC: 8.1 G/DL (ref 6.4–8.2)
RBC # BLD AUTO: 4.46 MILLION/UL (ref 3.81–5.12)
SODIUM SERPL-SCNC: 138 MMOL/L (ref 136–145)
WBC # BLD AUTO: 4.9 THOUSAND/UL (ref 4.31–10.16)

## 2022-02-28 PROCEDURE — 36415 COLL VENOUS BLD VENIPUNCTURE: CPT

## 2022-02-28 PROCEDURE — 86140 C-REACTIVE PROTEIN: CPT

## 2022-02-28 PROCEDURE — 85025 COMPLETE CBC W/AUTO DIFF WBC: CPT

## 2022-02-28 PROCEDURE — 80053 COMPREHEN METABOLIC PANEL: CPT

## 2022-03-18 ENCOUNTER — TELEPHONE (OUTPATIENT)
Dept: OBGYN CLINIC | Facility: CLINIC | Age: 27
End: 2022-03-18

## 2022-03-18 DIAGNOSIS — N97.0 ANOVULATORY CYCLE: ICD-10-CM

## 2022-03-18 DIAGNOSIS — N92.6 IRREGULAR MENSTRUAL CYCLE: Primary | ICD-10-CM

## 2022-03-18 NOTE — TELEPHONE ENCOUNTER
----- Message from Martín [place] sent at 3/18/2022  8:51 AM EDT -----  Regarding: FW: Irregular Cycles    ----- Message -----  From: Sherine Nietomsted  Sent: 3/18/2022   8:37 AM EDT  To: , #  Subject: Irregular Cycles                                 Hi!  I just wanted to reach out to get your insight on my cycles  I stopped my birth control last year and I figured my irregular cycles were just because my body was trying to get back on track  I've been off of BC since July and I still have very irregular cycles/periods  Some months I have 37 day cycles with 10 day periods, other months I have 26 day cycles with 7 day periods  It's all over the place  This past month I tracked my ovulation with LH strips  My peak was 7 days before I got my period  I was wondering if you could order some bloodwork to check my hormones? Since we want to start trying to get pregnant soon, I just want to make sure everything's okay! Thanks!

## 2022-03-19 ENCOUNTER — APPOINTMENT (OUTPATIENT)
Dept: LAB | Facility: HOSPITAL | Age: 27
End: 2022-03-19
Attending: OBSTETRICS & GYNECOLOGY
Payer: COMMERCIAL

## 2022-03-19 DIAGNOSIS — N92.6 IRREGULAR MENSTRUAL CYCLE: ICD-10-CM

## 2022-03-19 DIAGNOSIS — N97.0 ANOVULATORY CYCLE: ICD-10-CM

## 2022-03-19 LAB
ESTRADIOL SERPL-MCNC: 40 PG/ML
FSH SERPL-ACNC: 5.1 MIU/ML
PROGEST SERPL-MCNC: 0.4 NG/ML

## 2022-03-19 PROCEDURE — 82397 CHEMILUMINESCENT ASSAY: CPT

## 2022-03-19 PROCEDURE — 84144 ASSAY OF PROGESTERONE: CPT

## 2022-03-19 PROCEDURE — 83001 ASSAY OF GONADOTROPIN (FSH): CPT

## 2022-03-19 PROCEDURE — 82670 ASSAY OF TOTAL ESTRADIOL: CPT

## 2022-03-19 PROCEDURE — 36415 COLL VENOUS BLD VENIPUNCTURE: CPT

## 2022-03-22 ENCOUNTER — TELEPHONE (OUTPATIENT)
Dept: LABOR AND DELIVERY | Facility: HOSPITAL | Age: 27
End: 2022-03-22

## 2022-03-22 DIAGNOSIS — N97.0 ANOVULATORY CYCLE: Primary | ICD-10-CM

## 2022-03-23 ENCOUNTER — APPOINTMENT (OUTPATIENT)
Dept: LAB | Facility: HOSPITAL | Age: 27
End: 2022-03-23

## 2022-03-23 DIAGNOSIS — Z00.8 HEALTH EXAMINATION IN POPULATION SURVEYS: ICD-10-CM

## 2022-03-23 LAB
CHOLEST SERPL-MCNC: 163 MG/DL
EST. AVERAGE GLUCOSE BLD GHB EST-MCNC: 100 MG/DL
HBA1C MFR BLD: 5.1 %
HDLC SERPL-MCNC: 58 MG/DL
LDLC SERPL CALC-MCNC: 81 MG/DL (ref 0–100)
NONHDLC SERPL-MCNC: 105 MG/DL
TRIGL SERPL-MCNC: 120 MG/DL

## 2022-03-23 PROCEDURE — 36415 COLL VENOUS BLD VENIPUNCTURE: CPT

## 2022-03-23 PROCEDURE — 83036 HEMOGLOBIN GLYCOSYLATED A1C: CPT

## 2022-03-23 PROCEDURE — 80061 LIPID PANEL: CPT

## 2022-03-27 DIAGNOSIS — N92.6 IRREGULAR MENSES: Primary | ICD-10-CM

## 2022-03-27 LAB — MIS SERPL-MCNC: 4.47 NG/ML

## 2022-03-28 ENCOUNTER — APPOINTMENT (OUTPATIENT)
Dept: LAB | Facility: CLINIC | Age: 27
End: 2022-03-28
Payer: COMMERCIAL

## 2022-03-28 DIAGNOSIS — N92.6 IRREGULAR MENSES: ICD-10-CM

## 2022-03-28 PROCEDURE — 36415 COLL VENOUS BLD VENIPUNCTURE: CPT

## 2022-03-28 PROCEDURE — 84403 ASSAY OF TOTAL TESTOSTERONE: CPT

## 2022-03-28 PROCEDURE — 84402 ASSAY OF FREE TESTOSTERONE: CPT

## 2022-03-28 PROCEDURE — 82627 DEHYDROEPIANDROSTERONE: CPT

## 2022-03-29 LAB
DHEA-S SERPL-MCNC: 117 UG/DL (ref 84.8–378)
TESTOST FREE SERPL-MCNC: 1 PG/ML (ref 0–4.2)
TESTOST SERPL-MCNC: 8 NG/DL (ref 13–71)

## 2022-03-31 ENCOUNTER — HOSPITAL ENCOUNTER (OUTPATIENT)
Dept: ULTRASOUND IMAGING | Facility: HOSPITAL | Age: 27
Discharge: HOME | End: 2022-03-31
Payer: COMMERCIAL

## 2022-03-31 DIAGNOSIS — N92.6 IRREGULAR MENSES: ICD-10-CM

## 2022-03-31 PROCEDURE — 76830 TRANSVAGINAL US NON-OB: CPT

## 2022-03-31 PROCEDURE — 76856 US EXAM PELVIC COMPLETE: CPT

## 2022-04-06 ENCOUNTER — APPOINTMENT (OUTPATIENT)
Dept: LAB | Facility: CLINIC | Age: 27
End: 2022-04-06
Payer: COMMERCIAL

## 2022-04-06 DIAGNOSIS — N97.0 ANOVULATORY CYCLE: ICD-10-CM

## 2022-04-06 LAB — PROGEST SERPL-MCNC: 9.3 NG/ML

## 2022-04-06 PROCEDURE — 36415 COLL VENOUS BLD VENIPUNCTURE: CPT

## 2022-04-06 PROCEDURE — 84144 ASSAY OF PROGESTERONE: CPT

## 2022-04-07 ENCOUNTER — OFFICE VISIT (OUTPATIENT)
Dept: PHYSICAL THERAPY | Facility: CLINIC | Age: 27
End: 2022-04-07
Payer: COMMERCIAL

## 2022-04-07 DIAGNOSIS — M25.521 RIGHT ELBOW PAIN: Primary | ICD-10-CM

## 2022-04-07 PROCEDURE — 97140 MANUAL THERAPY 1/> REGIONS: CPT | Performed by: PHYSICAL THERAPIST

## 2022-04-07 PROCEDURE — 97161 PT EVAL LOW COMPLEX 20 MIN: CPT | Performed by: PHYSICAL THERAPIST

## 2022-04-07 PROCEDURE — 97112 NEUROMUSCULAR REEDUCATION: CPT | Performed by: PHYSICAL THERAPIST

## 2022-04-07 NOTE — PROGRESS NOTES
PT Evaluation     Today's date: 2022  Patient name: Robi Montemayor  : 1995  MRN: [id number]  Referring provider: Karley Dinh PT  Dx:   Encounter Diagnosis     ICD-10-CM    1  Right elbow pain  M25 521                   Assessment  Assessment details: 32year old female patient reports to PT with R elbow pain  No red flags noted and patient denies numbness/tingling  Patient symptoms correlate with R elbow lateral and medial epicondylitis as was TTP to both of those regions (lateral>medial)  Patient will benefit from skilled PT services to address current impairments and functional limitations to help patient return to her PLOF  Impairments: activity intolerance, impaired physical strength and pain with function    Symptom irritability: moderateUnderstanding of Dx/Px/POC: good   Prognosis: good    Goals  STG  1  Patient will be independent with completion of HEP throughout therapy  LTG  1  Patient will  objects with her R hand without increase in symptoms so she can complete her normal work duties in 6 weeks  2  Patient will have no pain with her daily activities in 6 weeks  Plan  Patient would benefit from: skilled physical therapy  Planned therapy interventions: joint mobilization, manual therapy, abdominal trunk stabilization, home exercise program, functional ROM exercises, flexibility, neuromuscular re-education, patient education, strengthening, stretching, therapeutic activities and therapeutic exercise  Frequency: 2x week  Duration in weeks: 6  Treatment plan discussed with: patient        Subjective Evaluation    History of Present Illness  Mechanism of injury: Patient reports with R elbow pain that has been slowly increasing over last couple months  Notes it's not as bad as last time she started physical therapy, but wants to stay ahead of it so it doesn't get as bad  Patient denies numbness/tingling  Patient notes she has some difficulty scanning for prolonged periods  Pain  Current pain rating: 3  At best pain ratin  At worst pain ratin  Quality: dull ache    Treatments  Previous treatment: physical therapy  Current treatment: physical therapy  Patient Goals  Patient goals for therapy: decreased pain, increased strength and return to sport/leisure activities          Objective     Palpation     Additional Palpation Details  Slight TTP to lateral epicondyle/wrist extensor region    Active Range of Motion     Left Elbow   Normal active range of motion    Right Elbow   Normal active range of motion    Strength/Myotome Testing     Additional Strength Details  R Wrist extension/flexion MMT slight decrease in strength             Precautions: none       Manuals 4/7             L wrist extensor/flexor wad IASTM/STM MK            R wrist extension MWM                                        Neuro Re-Ed             Supine serratus punches  r            Serratus wall slides                                                    Desk ergonomics             Lifting ergonomics             Ther Ex             Wrist extensor/flexor strengthening r            Wrist extension/flexion stretch w/ elbow straight r                         MH pre As needed            sidelying shoulder ER             Prone low trap retraction             Shoulder IR             Prone mid trap T's             Ther Activity                                       Gait Training                                       Modalities

## 2022-04-14 ENCOUNTER — OFFICE VISIT (OUTPATIENT)
Dept: PHYSICAL THERAPY | Facility: CLINIC | Age: 27
End: 2022-04-14
Payer: COMMERCIAL

## 2022-04-14 DIAGNOSIS — M25.521 RIGHT ELBOW PAIN: Primary | ICD-10-CM

## 2022-04-14 PROCEDURE — 97140 MANUAL THERAPY 1/> REGIONS: CPT

## 2022-04-14 NOTE — PROGRESS NOTES
Daily Note     Today's date: 2022  Patient name: Azael Hines  : 1995  MRN: [id number]  Referring provider: Franci Serra, PT  Dx:   Encounter Diagnosis     ICD-10-CM    1  Right elbow pain  M25 521                   Subjective: Pt reports that today is a rough day due to work being strenuous for her wrist  Notes pain at lateral epicondyle and triceps insertion that is reduced post-manuals  Objective: See treatment diary below     Precautions: none     Manuals            L wrist extensor/flexor wad IASTM/STM  [address]           R wrist extension MWM                                        Neuro Re-Ed             Supine serratus punches  r            Serratus wall slides                                                    Desk ergonomics             Lifting ergonomics             Ther Ex             Wrist ext/ flexor strengthening r            Wrist extension/flexion stretch w/ elbow straight r                         MH pre As needed            sidelying shoulder ER             Prone low trap retraction             Shoulder IR             Prone mid trap T's                                                                                               Assessment: Tolerated treatment well  Patient would benefit from continued PT    Plan: Continue per plan of care  Progress treatment as tolerated      Austen Brown, PTA

## 2022-04-21 ENCOUNTER — OFFICE VISIT (OUTPATIENT)
Dept: PHYSICAL THERAPY | Facility: CLINIC | Age: 27
End: 2022-04-21
Payer: COMMERCIAL

## 2022-04-21 DIAGNOSIS — M25.521 RIGHT ELBOW PAIN: Primary | ICD-10-CM

## 2022-04-21 PROCEDURE — 97140 MANUAL THERAPY 1/> REGIONS: CPT

## 2022-04-21 PROCEDURE — 97110 THERAPEUTIC EXERCISES: CPT

## 2022-04-21 NOTE — PROGRESS NOTES
Daily Note     Today's date: 2022  Patient name: Sheldon Elkins  : 1995  MRN: [id number]  Referring provider: Arianna Somers PT  Dx:   Encounter Diagnosis     ICD-10-CM    1  Right elbow pain  M25 521                   Subjective: Pt notes lateral epicondyle and olecranon pain as well as R ulnar nerve numbness in pinky  Objective: See treatment diary below     Precautions: none     Manuals           R wrist extensor/flexor IASTM/STM [address] SH & MFD          R wrist extension MWM                                        Neuro Re-Ed            Supine serratus punches  r  *          Serratus wall slides   5"x20                                                 Desk ergonomics             Lifting ergonomics             Ther Ex            Wrist ext/ flexor strengthening r  2# 20x ea          Wrist extension/flexion stretch w/ elbow straight r  20"x5 ea                       MH pre As needed            sidelying shoulder ER   B/l GTB ER 5"x20           Prone low trap retraction             Shoulder IR             Prone mid trap T's                                                                                               Assessment: Tolerated treatment well  Patient demonstrated fatigue post treatment, exhibited good technique with therapeutic exercises and would benefit from continued PT    Plan: Continue per plan of care  Progress treatment as tolerated      Real Prophet, PTA

## 2022-04-28 ENCOUNTER — OFFICE VISIT (OUTPATIENT)
Dept: PHYSICAL THERAPY | Facility: CLINIC | Age: 27
End: 2022-04-28
Payer: COMMERCIAL

## 2022-04-28 DIAGNOSIS — M25.521 RIGHT ELBOW PAIN: Primary | ICD-10-CM

## 2022-04-28 PROCEDURE — 97110 THERAPEUTIC EXERCISES: CPT | Performed by: PHYSICAL THERAPIST

## 2022-04-28 PROCEDURE — 97112 NEUROMUSCULAR REEDUCATION: CPT | Performed by: PHYSICAL THERAPIST

## 2022-04-28 PROCEDURE — 97140 MANUAL THERAPY 1/> REGIONS: CPT | Performed by: PHYSICAL THERAPIST

## 2022-04-28 NOTE — PROGRESS NOTES
Daily Note     Today's date: 2022  Patient name: Deb Llamas  : 1995  MRN: [id number]  Referring provider: Maty Hough PT  Dx:   Encounter Diagnosis     ICD-10-CM    1  Right elbow pain  M25 521                   Subjective: Patient notes elbow feeling better, but if has a patient who requires a long time to use the sonographer, it can flare up her elbow  Objective: See treatment diary below     Precautions: none     Manuals          R wrist extensor/flexor IASTM/STM [facility] SH SH & MFD [facility] and MFD         R wrist extension MWM                                        Neuro Re-Ed            Supine serratus punches  r  *          Serratus wall slides   5"x20                                                 Desk erggilless             Lifting ergonomics             Ther Ex            Wrist ext/ flexor strengthening r  2# 20x ea 2x12 2 lbs         Wrist extension/flexion stretch w/ elbow straight r  20"x5 ea 5x 20" holds                       MH pre As needed            sidelying shoulder ER   B/l GTB ER 5"x20  20x 5" holds grn          Prone low trap retraction             Shoulder IR             Prone mid trap T's                                                                                               Assessment: Tolerated treatment well  Patient demonstrated fatigue post treatment, exhibited good technique with therapeutic exercises and would benefit from continued PT    Plan: Continue per plan of care  Progress treatment as tolerated      Kuldip Friedman PT

## 2022-05-05 ENCOUNTER — OFFICE VISIT (OUTPATIENT)
Dept: PHYSICAL THERAPY | Facility: CLINIC | Age: 27
End: 2022-05-05
Payer: COMMERCIAL

## 2022-05-05 DIAGNOSIS — M25.521 RIGHT ELBOW PAIN: Primary | ICD-10-CM

## 2022-05-05 PROCEDURE — 97140 MANUAL THERAPY 1/> REGIONS: CPT

## 2022-05-05 NOTE — PROGRESS NOTES
Daily Note     Today's date: 2022  Patient name: Sheldon Elkins  : 1995  MRN: [id number]  Referring provider: Arianna Somers PT  Dx:   Encounter Diagnosis     ICD-10-CM    1  Right elbow pain  M25 521                   Subjective: Pt notes forearm soreness, particularly on medial epicondyle and ulnar side to wrist  Denies shoulder pain  Objective: See treatment diary below     Precautions: none     Manuals         R wrist extensor/flexor IASTM/STM  [address] SH SH & MFD  [address] and MFD SH  & MFD        R wrist extension MWM                                        Neuro Re-Ed          Supine serratus punches  r  *          Serratus wall slides   5"x20                                                 Desk ergonomics             Lifting ergonomics             Ther Ex            Wrist ext/ flexor strengthening r  2# 20x ea 2x12 2 lbs         Wrist extension/flexion stretch w/ elbow straight r  20"x5 ea 5x 20" holds                       MH pre As needed    10'        sidelying shoulder ER   B/l GTB ER 5"x20  20x 5" holds grn          Prone low trap retraction             Shoulder IR             Prone mid trap T's                                                                                               Assessment: Tolerated treatment well  Patient would benefit from continued PT  Plan: Continue per plan of care  Progress treatment as tolerated      Real Prophet, PTA

## 2022-05-12 ENCOUNTER — OFFICE VISIT (OUTPATIENT)
Dept: PHYSICAL THERAPY | Facility: CLINIC | Age: 27
End: 2022-05-12
Payer: COMMERCIAL

## 2022-05-12 DIAGNOSIS — M25.521 RIGHT ELBOW PAIN: Primary | ICD-10-CM

## 2022-05-12 PROCEDURE — 97140 MANUAL THERAPY 1/> REGIONS: CPT

## 2022-05-12 PROCEDURE — 97110 THERAPEUTIC EXERCISES: CPT

## 2022-05-12 NOTE — PROGRESS NOTES
Daily Note     Today's date: 2022  Patient name: Ofelia Johnson  : 1995  MRN: [id number]  Referring provider: Julio Cesar Huber, PT  Dx:   Encounter Diagnosis     ICD-10-CM    1  Right elbow pain  M25 521                   Subjective: Pt notes she was a bit sore following LV and is apprehensive about busy schedule next week  Objective: See treatment diary below     Precautions: none     Manuals        R wrist extensor/flexor IASTM/STM  [address] SH SH & MFD  [address] and MFD SH  & MFD SH & MFD       R wrist extension MWM                                        Neuro Re-Ed         Supine serratus punches  r  *          Serratus wall slides   5"x20   5"x20                                              Desk ergonomics             Lifting ergonomics             Ther Ex         Wrist ext/ flexor strengthening r  2# 20x ea 2x12 2 lbs  3# 2x10 ea       Wrist extension/flexion stretch w/ elbow straight r  20"x5 ea 5x 20" holds   20"x5 ea                    MH pre As needed    10' 10'       B/l TB ER   GTB 5"x20  20x5"  GTB  5"x20 GTB       Prone low trap retraction             Shoulder IR             Prone mid trap T's                                                                                               Assessment: Tolerated treatment well  Patient demonstrated fatigue post treatment, exhibited good technique with therapeutic exercises and would benefit from continued PT    Plan: Continue per plan of care  Progress treatment as tolerated      Rylie Lezama, PTA

## 2022-05-19 ENCOUNTER — OFFICE VISIT (OUTPATIENT)
Dept: PHYSICAL THERAPY | Facility: CLINIC | Age: 27
End: 2022-05-19
Payer: COMMERCIAL

## 2022-05-19 DIAGNOSIS — M25.521 RIGHT ELBOW PAIN: Primary | ICD-10-CM

## 2022-05-19 PROCEDURE — 97110 THERAPEUTIC EXERCISES: CPT

## 2022-05-19 PROCEDURE — 97140 MANUAL THERAPY 1/> REGIONS: CPT

## 2022-05-19 NOTE — PROGRESS NOTES
Daily Note     Today's date: 2022  Patient name: Bakari Ortiz  : 1995  MRN: [id number]  Referring provider: Chen Camacho, PT  Dx:   Encounter Diagnosis     ICD-10-CM    1  Right elbow pain  M25 521                   Subjective: Pt reports that she is surprised that her forearm isn't more sore due to increased workload this week  She does note posterior R shoulder soreness that is decreased post-manuals  Objective: See treatment diary below     Precautions: none     Manuals       R wrist extensor/flexor IASTM/STM  [address] SH SH & MFD [address] and MFD SH  & MFD SH & MFD SH &  R scap TPR      R wrist extension MWM                                        Neuro Re-Ed        Supine serratus punches  r  *          Serratus wall slides   5"x20   5"x20 5"x20                                             Desk ergonomics             Lifting ergonomics             Ther Ex        Wrist ext/ flexor strengthening r  2# 20x ea 2x12 2 lbs  3# 2x10 ea 3#  2x10 ea      Wrist extension/flexion stretch w/ elbow straight r  20"x5 ea 5x 20" holds   20"x5 ea 20"x5 ea                   MH pre As needed    10' 10' 10'      B/l TB ER   GTB 5"x20  20x5"  GTB  5"x20 GTB 5"x20 GTB      Prone low trap retraction             Shoulder IR             Prone mid trap T's                                                                                               Assessment: Tolerated treatment well  Patient demonstrated fatigue post treatment, exhibited good technique with therapeutic exercises and would benefit from continued PT    Plan: Continue per plan of care  Progress treatment as tolerated      Chen Landeros PTA

## 2022-05-26 ENCOUNTER — OFFICE VISIT (OUTPATIENT)
Dept: PHYSICAL THERAPY | Facility: CLINIC | Age: 27
End: 2022-05-26
Payer: COMMERCIAL

## 2022-05-26 DIAGNOSIS — M25.521 RIGHT ELBOW PAIN: Primary | ICD-10-CM

## 2022-05-26 PROCEDURE — 97140 MANUAL THERAPY 1/> REGIONS: CPT

## 2022-05-26 NOTE — PROGRESS NOTES
Daily Note     Today's date: 2022  Patient name: Butch Wade  : 1995  MRN: [id number]  Referring provider: Ari Olivares PT  Dx:   Encounter Diagnosis     ICD-10-CM    1  Right elbow pain  M25 521                   Subjective: Pt notes bilateral 4th MCP joint pain  Objective: See treatment diary below     Precautions: none     Manuals      R wrist extensor/flexor IASTM/STM [address] SH SH & MFD [facility] and MFD SH  & MFD SH & MFD SH &  R scap TPR SH     R wrist extension MWM                                        Neuro Re-Ed        Supine serratus punches  r  *          Serratus wall slides   5"x20   5"x20 5"x20                                             Desk ergonomics             Lifting ergonomics             Ther Ex        Wrist ext/ flexor strengthening r  2# 20x ea 2x12 2 lbs  3# 2x10 ea 3#  2x10 ea      Wrist extension/flexion stretch w/ elbow straight r  20"x5 ea 5x 20" holds   20"x5 ea 20"x5 ea                   MH pre As needed    10' 10' 10'      B/l TB ER   GTB 5"x20  20x5"  GTB  5"x20 GTB 5"x20 GTB      Prone low trap retraction             Shoulder IR             Prone mid trap T's                                                                                               Assessment: Tolerated treatment well  Patient would benefit from continued PT    Plan: Continue per plan of care      Presley Chang PTA

## 2022-06-02 ENCOUNTER — OFFICE VISIT (OUTPATIENT)
Dept: PHYSICAL THERAPY | Facility: CLINIC | Age: 27
End: 2022-06-02
Payer: COMMERCIAL

## 2022-06-02 DIAGNOSIS — M25.521 RIGHT ELBOW PAIN: Primary | ICD-10-CM

## 2022-06-02 PROCEDURE — 97140 MANUAL THERAPY 1/> REGIONS: CPT

## 2022-06-02 NOTE — PROGRESS NOTES
Daily Note     Today's date: 2022  Patient name: Marvin Elizondo  : 1995  MRN: [id number]  Referring provider: Demetris Allen, PT  Dx:   Encounter Diagnosis     ICD-10-CM    1  Right elbow pain  M25 521                 Subjective: Pt elbow and forearm sore due to increased pressure for US obese pts at work  Objective: See treatment diary below     Precautions: none     Manuals     R wrist extensor/flexor IASTM/STM [address] SH SH & MFD MK and MFD SH  & MFD SH & MFD SH &  R scap TPR SH SH    R wrist extension MWM                                        Neuro Re-Ed        Supine serratus punches  r  *          Serratus wall slides   5"x20   5"x20 5"x20                                             Desk ergonomics             Lifting ergonomics             Ther Ex        Wrist ext/ flexor strengthening r  2# 20x ea 2x12 2 lbs  3# 2x10 ea 3#  2x10 ea      Wrist extension/flexion stretch w/ elbow straight r  20"x5 ea 5x 20" holds   20"x5 ea 20"x5 ea                   MH pre As needed    10' 10' 10'      B/l TB ER   GTB 5"x20  20x5"  GTB  5"x20 GTB 5"x20 GTB      Prone low trap retraction             Shoulder IR             Prone mid trap T's                                                                                               Assessment: Tolerated treatment well  Patient would benefit from continued PT    Plan: Continue per plan of care  Progress treatment as tolerated      Olive Hoffman, PTA

## 2022-06-09 ENCOUNTER — APPOINTMENT (OUTPATIENT)
Dept: PHYSICAL THERAPY | Facility: CLINIC | Age: 27
End: 2022-06-09
Payer: COMMERCIAL

## 2022-06-16 ENCOUNTER — OFFICE VISIT (OUTPATIENT)
Dept: PHYSICAL THERAPY | Facility: CLINIC | Age: 27
End: 2022-06-16
Payer: COMMERCIAL

## 2022-06-16 DIAGNOSIS — M25.521 RIGHT ELBOW PAIN: Primary | ICD-10-CM

## 2022-06-16 PROCEDURE — 97140 MANUAL THERAPY 1/> REGIONS: CPT

## 2022-06-16 NOTE — PROGRESS NOTES
Daily Note     Today's date: 2022  Patient name: Kinjal Tariq  : 1995  MRN: [id number]  Referring provider: Tonja Yoo, JAMES  Dx:   Encounter Diagnosis     ICD-10-CM    1  Right elbow pain  M25 521                   Subjective: Pt notes shoulder and forearm soreness, TTP proximal to R medial epicondyle  Objective: See treatment diary below     Precautions: none     Manuals    R wrist extensor/flexor IASTM/STM SH  & MFD SH & MFD SH &  R scap TPR SH SH SH   R wrist extension MWM          R shoulder TPR      SH            Neuro Re-Ed    Supine serratus punches          Serratus wall slides  5"x20 5"x20      Ther Ex     Wrist ext/ flexor strengthening  3# 2x10 ea 3#  2x10 ea      Wrist extension/flexion stretch w/ elbow straight  20"x5 ea 20"x5 ea      B/l TB ER  5"x20 GTB 5"x20 GTB      Prone low trap retraction         Shoulder IR         Prone mid trap T's                           Modalities    MHP pre 10' 10' 10' 10' 10' 10'                         Assessment: Tolerated treatment well  Patient would benefit from continued PT    Plan: Continue per plan of care  Progress treatment as tolerated      Samantha Cotto, PTA

## 2022-06-23 ENCOUNTER — OFFICE VISIT (OUTPATIENT)
Dept: PHYSICAL THERAPY | Facility: CLINIC | Age: 27
End: 2022-06-23
Payer: COMMERCIAL

## 2022-06-23 DIAGNOSIS — M25.521 RIGHT ELBOW PAIN: Primary | ICD-10-CM

## 2022-06-23 PROCEDURE — 97140 MANUAL THERAPY 1/> REGIONS: CPT

## 2022-06-23 NOTE — PROGRESS NOTES
Daily Note     Today's date: 2022  Patient name: Desiree Duval  : 1995  MRN: [id number]  Referring provider: Antony Hudson PT  Dx:   Encounter Diagnosis     ICD-10-CM    1  Right elbow pain  M25 521                   Subjective: Improved tightness and pain noted in extensors  TTP  Objective: See treatment diary below     Precautions: none     Manuals       R wrist extensor/flexor IASTM/STM [address] SH SH      R wrist extension MWM          R shoulder TPR  SH SH               Neuro Re-Ed        Supine serratus punches          Serratus wall slides         Ther Ex        Wrist ext/ flexor strengthening         Wrist extension/flexion stretch w/ elbow straight         B/l TB ER         Prone low trap retraction         Shoulder IR         Prone mid trap T's                           Modalities       MHP pre 10' 10' 25                            Assessment: Tolerated treatment well  Patient would benefit from continued PT    Plan: Continue per plan of care      Diana Magana, PTA

## 2022-06-30 ENCOUNTER — APPOINTMENT (OUTPATIENT)
Dept: PHYSICAL THERAPY | Facility: CLINIC | Age: 27
End: 2022-06-30
Payer: COMMERCIAL

## 2022-09-23 ENCOUNTER — OFFICE VISIT (OUTPATIENT)
Dept: INTERNAL MEDICINE CLINIC | Facility: CLINIC | Age: 27
End: 2022-09-23
Payer: COMMERCIAL

## 2022-09-23 VITALS
TEMPERATURE: 98.5 F | BODY MASS INDEX: 25.47 KG/M2 | DIASTOLIC BLOOD PRESSURE: 60 MMHG | HEIGHT: 67 IN | SYSTOLIC BLOOD PRESSURE: 118 MMHG | OXYGEN SATURATION: 99 % | WEIGHT: 162.25 LBS | HEART RATE: 60 BPM

## 2022-09-23 DIAGNOSIS — Z13.0 SCREENING FOR DEFICIENCY ANEMIA: ICD-10-CM

## 2022-09-23 DIAGNOSIS — F41.1 GAD (GENERALIZED ANXIETY DISORDER): Primary | ICD-10-CM

## 2022-09-23 DIAGNOSIS — Z13.1 SCREENING FOR DIABETES MELLITUS: ICD-10-CM

## 2022-09-23 DIAGNOSIS — Z13.29 SCREENING FOR THYROID DISORDER: ICD-10-CM

## 2022-09-23 DIAGNOSIS — Z13.220 SCREENING, LIPID: ICD-10-CM

## 2022-09-23 DIAGNOSIS — Z23 NEED FOR VACCINATION: ICD-10-CM

## 2022-09-23 DIAGNOSIS — E55.9 VITAMIN D DEFICIENCY: ICD-10-CM

## 2022-09-23 PROCEDURE — 90686 IIV4 VACC NO PRSV 0.5 ML IM: CPT | Performed by: PHYSICIAN ASSISTANT

## 2022-09-23 PROCEDURE — 90471 IMMUNIZATION ADMIN: CPT | Performed by: PHYSICIAN ASSISTANT

## 2022-09-23 PROCEDURE — 99213 OFFICE O/P EST LOW 20 MIN: CPT | Performed by: PHYSICIAN ASSISTANT

## 2022-09-23 RX ORDER — BUSPIRONE HYDROCHLORIDE 10 MG/1
10 TABLET ORAL 3 TIMES DAILY
Qty: 90 TABLET | Refills: 0 | Status: SHIPPED | OUTPATIENT
Start: 2022-09-23

## 2022-09-23 RX ORDER — DIPHENOXYLATE HYDROCHLORIDE AND ATROPINE SULFATE 2.5; .025 MG/1; MG/1
1 TABLET ORAL DAILY
COMMUNITY

## 2022-09-26 NOTE — PROGRESS NOTES
Name: Octavio Rogers      : 1995      MRN: [id number]  Encounter Provider: Sg Red PA-C  Encounter Date: 2022   Encounter department: [address] [place] [address]  SALLIE (generalized anxiety disorder)  Assessment & Plan:  Pts symptoms are stable with current regime  No changes at present  Orders:  -     busPIRone (BUSPAR) 10 mg tablet; Take 1 tablet (10 mg total) by mouth 3 (three) times a day    2  Vitamin D deficiency  -     Vitamin D 25 hydroxy; Future    3  Screening for diabetes mellitus  -     Comprehensive metabolic panel; Future    4  Screening for thyroid disorder  -     TSH, 3rd generation; Future    5  Screening for deficiency anemia  -     CBC and differential; Future    6  Screening, lipid  -     Lipid panel; Future    7  Need for vaccination  -     influenza vaccine, quadrivalent, 0 5 mL, preservative-free, for adult and pediatric patients 6 mos+ (AFLURIA, FLUARIX, FLULAVAL, FLUZONE)      BMI Counseling: Body mass index is 25 41 kg/m²  The BMI is above normal  Nutrition recommendations include decreasing portion sizes, consuming healthier snacks and limiting drinks that contain sugar  Rationale for BMI follow-up plan is due to patient being overweight or obese  Subjective      Pt presents for routine visit  She is doing well overall  She is noting progress in her grieving process with the loss of her dad last fall but still has days that are quite difficult  She is due for labs and desires flu vaccine  She denies any new complaints or concerns  Review of Systems   Constitutional: Negative for chills and fever  HENT: Negative for congestion, ear pain, hearing loss, postnasal drip, rhinorrhea, sinus pressure, sinus pain, sore throat and trouble swallowing  Eyes: Negative for pain and visual disturbance  Respiratory: Negative for cough, chest tightness, shortness of breath and wheezing  Cardiovascular: Negative    Negative for chest pain, palpitations and leg swelling  Gastrointestinal: Negative for abdominal pain, blood in stool, constipation, diarrhea, nausea and vomiting  Endocrine: Negative for cold intolerance, heat intolerance, polydipsia, polyphagia and polyuria  Genitourinary: Negative for difficulty urinating, dysuria, flank pain and urgency  Musculoskeletal: Negative for arthralgias, back pain, gait problem and myalgias  Skin: Negative for rash  Allergic/Immunologic: Negative  Neurological: Negative for dizziness, weakness, light-headedness and headaches  Hematological: Negative  Psychiatric/Behavioral: Negative for behavioral problems, dysphoric mood and sleep disturbance  The patient is not nervous/anxious  Current Outpatient Medications on File Prior to Visit   Medication Sig    ergocalciferol (VITAMIN D2) 50,000 units Take 1 capsule (50,000 Units total) by mouth once a week    multivitamin (THERAGRAN) TABS Take 1 tablet by mouth daily    pantoprazole (PROTONIX) 40 mg tablet Take 1 tablet (40 mg total) by mouth daily    EPINEPHrine (EPIPEN) 0 3 mg/0 3 mL SOAJ Inject 0 3 mL (0 3 mg total) into a muscle once for 1 dose       Objective     /60 (BP Location: Left arm, Patient Position: Sitting)   Pulse 60   Temp 98 5 °F (36 9 °C)   Ht 5' 7" (1 702 m)   Wt 73 6 kg (162 lb 4 oz)   SpO2 99%   BMI 25 41 kg/m²     Physical Exam  Constitutional:       General: She is not in acute distress  Appearance: She is well-developed  She is not diaphoretic  HENT:      Head: Normocephalic and atraumatic  Right Ear: External ear normal       Left Ear: External ear normal       Nose: Nose normal       Mouth/Throat:      Pharynx: No oropharyngeal exudate  Eyes:      General: No scleral icterus  Right eye: No discharge  Left eye: No discharge  Conjunctiva/sclera: Conjunctivae normal       Pupils: Pupils are equal, round, and reactive to light     Neck:      Thyroid: No thyromegaly  Cardiovascular:      Rate and Rhythm: Normal rate and regular rhythm  Heart sounds: Normal heart sounds  No murmur heard  No friction rub  No gallop  Pulmonary:      Effort: Pulmonary effort is normal  No respiratory distress  Breath sounds: Normal breath sounds  No wheezing or rales  Abdominal:      General: Bowel sounds are normal  There is no distension  Palpations: Abdomen is soft  Tenderness: There is no abdominal tenderness  Musculoskeletal:         General: No tenderness or deformity  Normal range of motion  Cervical back: Normal range of motion and neck supple  Skin:     General: Skin is warm and dry  Neurological:      Mental Status: She is alert and oriented to person, place, and time  Cranial Nerves: No cranial nerve deficit  Psychiatric:         Behavior: Behavior normal          Thought Content:  Thought content normal          Judgment: Judgment normal        Amie Iqbal PA-C

## 2022-10-21 ENCOUNTER — APPOINTMENT (OUTPATIENT)
Dept: LAB | Facility: CLINIC | Age: 27
End: 2022-10-21
Payer: COMMERCIAL

## 2022-10-21 DIAGNOSIS — Z13.1 SCREENING FOR DIABETES MELLITUS: ICD-10-CM

## 2022-10-21 DIAGNOSIS — E55.9 VITAMIN D DEFICIENCY: ICD-10-CM

## 2022-10-21 DIAGNOSIS — Z13.29 SCREENING FOR THYROID DISORDER: ICD-10-CM

## 2022-10-21 DIAGNOSIS — Z13.0 SCREENING FOR DEFICIENCY ANEMIA: ICD-10-CM

## 2022-10-21 DIAGNOSIS — Z13.220 SCREENING, LIPID: ICD-10-CM

## 2022-10-21 LAB
25(OH)D3 SERPL-MCNC: 27.8 NG/ML (ref 30–100)
ALBUMIN SERPL BCP-MCNC: 4.1 G/DL (ref 3.5–5)
ALP SERPL-CCNC: 60 U/L (ref 46–116)
ALT SERPL W P-5'-P-CCNC: 24 U/L (ref 12–78)
ANION GAP SERPL CALCULATED.3IONS-SCNC: 6 MMOL/L (ref 4–13)
AST SERPL W P-5'-P-CCNC: 15 U/L (ref 5–45)
BASOPHILS # BLD AUTO: 0.03 THOUSANDS/ÂΜL (ref 0–0.1)
BASOPHILS NFR BLD AUTO: 1 % (ref 0–1)
BILIRUB SERPL-MCNC: 0.64 MG/DL (ref 0.2–1)
BUN SERPL-MCNC: 12 MG/DL (ref 5–25)
CALCIUM SERPL-MCNC: 9.6 MG/DL (ref 8.3–10.1)
CHLORIDE SERPL-SCNC: 104 MMOL/L (ref 96–108)
CHOLEST SERPL-MCNC: 163 MG/DL
CO2 SERPL-SCNC: 27 MMOL/L (ref 21–32)
CREAT SERPL-MCNC: 0.87 MG/DL (ref 0.6–1.3)
EOSINOPHIL # BLD AUTO: 0.04 THOUSAND/ÂΜL (ref 0–0.61)
EOSINOPHIL NFR BLD AUTO: 1 % (ref 0–6)
ERYTHROCYTE [DISTWIDTH] IN BLOOD BY AUTOMATED COUNT: 12.3 % (ref 11.6–15.1)
GFR SERPL CREATININE-BSD FRML MDRD: 91 ML/MIN/1.73SQ M
GLUCOSE P FAST SERPL-MCNC: 88 MG/DL (ref 65–99)
HCT VFR BLD AUTO: 43.3 % (ref 34.8–46.1)
HDLC SERPL-MCNC: 62 MG/DL
HGB BLD-MCNC: 13.6 G/DL (ref 11.5–15.4)
IMM GRANULOCYTES # BLD AUTO: 0.01 THOUSAND/UL (ref 0–0.2)
IMM GRANULOCYTES NFR BLD AUTO: 0 % (ref 0–2)
LDLC SERPL CALC-MCNC: 91 MG/DL (ref 0–100)
LYMPHOCYTES # BLD AUTO: 1.46 THOUSANDS/ÂΜL (ref 0.6–4.47)
LYMPHOCYTES NFR BLD AUTO: 32 % (ref 14–44)
MCH RBC QN AUTO: 29.4 PG (ref 26.8–34.3)
MCHC RBC AUTO-ENTMCNC: 31.4 G/DL (ref 31.4–37.4)
MCV RBC AUTO: 94 FL (ref 82–98)
MONOCYTES # BLD AUTO: 0.39 THOUSAND/ÂΜL (ref 0.17–1.22)
MONOCYTES NFR BLD AUTO: 9 % (ref 4–12)
NEUTROPHILS # BLD AUTO: 2.57 THOUSANDS/ÂΜL (ref 1.85–7.62)
NEUTS SEG NFR BLD AUTO: 57 % (ref 43–75)
NONHDLC SERPL-MCNC: 101 MG/DL
NRBC BLD AUTO-RTO: 0 /100 WBCS
PLATELET # BLD AUTO: 263 THOUSANDS/UL (ref 149–390)
PMV BLD AUTO: 10.3 FL (ref 8.9–12.7)
POTASSIUM SERPL-SCNC: 4.6 MMOL/L (ref 3.5–5.3)
PROT SERPL-MCNC: 8 G/DL (ref 6.4–8.4)
RBC # BLD AUTO: 4.63 MILLION/UL (ref 3.81–5.12)
SODIUM SERPL-SCNC: 137 MMOL/L (ref 135–147)
TRIGL SERPL-MCNC: 48 MG/DL
TSH SERPL DL<=0.05 MIU/L-ACNC: 2.51 UIU/ML (ref 0.45–4.5)
WBC # BLD AUTO: 4.5 THOUSAND/UL (ref 4.31–10.16)

## 2022-10-21 PROCEDURE — 80061 LIPID PANEL: CPT

## 2022-10-21 PROCEDURE — 36415 COLL VENOUS BLD VENIPUNCTURE: CPT

## 2022-10-21 PROCEDURE — 84443 ASSAY THYROID STIM HORMONE: CPT

## 2022-10-21 PROCEDURE — 82306 VITAMIN D 25 HYDROXY: CPT

## 2022-10-21 PROCEDURE — 85025 COMPLETE CBC W/AUTO DIFF WBC: CPT

## 2022-10-21 PROCEDURE — 80053 COMPREHEN METABOLIC PANEL: CPT

## 2022-10-27 ENCOUNTER — OFFICE VISIT (OUTPATIENT)
Dept: URGENT CARE | Facility: MEDICAL CENTER | Age: 27
End: 2022-10-27
Payer: COMMERCIAL

## 2022-10-27 VITALS
HEART RATE: 79 BPM | RESPIRATION RATE: 18 BRPM | OXYGEN SATURATION: 100 % | WEIGHT: 161 LBS | TEMPERATURE: 98 F | SYSTOLIC BLOOD PRESSURE: 122 MMHG | BODY MASS INDEX: 25.27 KG/M2 | DIASTOLIC BLOOD PRESSURE: 76 MMHG | HEIGHT: 67 IN

## 2022-10-27 DIAGNOSIS — J01.00 ACUTE NON-RECURRENT MAXILLARY SINUSITIS: Primary | ICD-10-CM

## 2022-10-27 PROCEDURE — 99213 OFFICE O/P EST LOW 20 MIN: CPT | Performed by: NURSE PRACTITIONER

## 2022-10-27 RX ORDER — AMOXICILLIN AND CLAVULANATE POTASSIUM 875; 125 MG/1; MG/1
1 TABLET, FILM COATED ORAL EVERY 12 HOURS SCHEDULED
Qty: 14 TABLET | Refills: 0 | Status: SHIPPED | OUTPATIENT
Start: 2022-10-27 | End: 2022-11-03

## 2022-10-27 RX ORDER — FLUTICASONE PROPIONATE 50 MCG
1 SPRAY, SUSPENSION (ML) NASAL DAILY
Qty: 9.9 ML | Refills: 0 | Status: SHIPPED | OUTPATIENT
Start: 2022-10-27

## 2022-10-27 NOTE — PROGRESS NOTES
[address] [facility]        NAME: Melissa Melgoza is a 32 y o  female  : 1995    MRN: [id number]  DATE: 2022  TIME: 1:33 PM    Assessment and Plan   Acute non-recurrent maxillary sinusitis [J01 00]  1  Acute non-recurrent maxillary sinusitis  amoxicillin-clavulanate (AUGMENTIN) 875-125 mg per tablet    fluticasone (FLONASE) 50 mcg/act nasal spray         Patient Instructions     Patient Instructions   Take medication as directed  Rest and drink plenty of fluids  A cool mist humidifier and saline rinse can be helpful  If you develop a worsening cough, chest pain, shortness of breath, palpitations, coughing up blood, prolonged high fever, severe headache, dizziness, any new or concerning symptoms please return or proceed ER  Recommend following up with PCP in 3-5 days          Follow up with PCP in 3-5 days  Proceed to  ER if symptoms worsen  Chief Complaint     Chief Complaint   Patient presents with   • Cold Like Symptoms     Sinus pressure and congestion x 1 week , home covid test on  negative results          History of Present Illness       Sinusitis  This is a new problem  The current episode started in the past 7 days  The problem has been gradually worsening since onset  There has been no fever  The pain is moderate  Associated symptoms include congestion, coughing and sinus pressure  Pertinent negatives include no chills, diaphoresis, ear pain, headaches, hoarse voice, neck pain, shortness of breath, sneezing, sore throat or swollen glands  Past treatments include oral decongestants  The treatment provided mild relief  Had negative home covid test    Review of Systems   Review of Systems   Constitutional: Negative for chills, diaphoresis, fatigue and fever  HENT: Positive for congestion, postnasal drip, rhinorrhea, sinus pressure and sinus pain  Negative for ear pain, facial swelling, hoarse voice, sneezing, sore throat, tinnitus and trouble swallowing      Eyes: Negative  Respiratory: Positive for cough  Negative for chest tightness, shortness of breath, wheezing and stridor  Cardiovascular: Negative for chest pain and palpitations  Gastrointestinal: Negative  Musculoskeletal: Negative for arthralgias, back pain, joint swelling, myalgias, neck pain and neck stiffness  Neurological: Negative for dizziness, facial asymmetry, weakness, light-headedness, numbness and headaches           Current Medications       Current Outpatient Medications:   •  amoxicillin-clavulanate (AUGMENTIN) 875-125 mg per tablet, Take 1 tablet by mouth every 12 (twelve) hours for 7 days, Disp: 14 tablet, Rfl: 0  •  busPIRone (BUSPAR) 10 mg tablet, Take 1 tablet (10 mg total) by mouth 3 (three) times a day, Disp: 90 tablet, Rfl: 0  •  ergocalciferol (VITAMIN D2) 50,000 units, Take 1 capsule (50,000 Units total) by mouth once a week, Disp: 4 capsule, Rfl: 2  •  fluticasone (FLONASE) 50 mcg/act nasal spray, 1 spray into each nostril daily, Disp: 9 9 mL, Rfl: 0  •  multivitamin (THERAGRAN) TABS, Take 1 tablet by mouth daily, Disp: , Rfl:   •  pantoprazole (PROTONIX) 40 mg tablet, Take 1 tablet (40 mg total) by mouth daily, Disp: 90 tablet, Rfl: 1  •  EPINEPHrine (EPIPEN) 0 3 mg/0 3 mL SOAJ, Inject 0 3 mL (0 3 mg total) into a muscle once for 1 dose, Disp: 0 6 mL, Rfl: 2    Current Allergies     Allergies as of 10/27/2022 - Reviewed 10/27/2022   Allergen Reaction Noted   • Levofloxacin Hives 11/23/2016            The following portions of the patient's history were reviewed and updated as appropriate: allergies, current medications, past family history, past medical history, past social history, past surgical history and problem list      Past Medical History:   Diagnosis Date   • Asthma     exercise induced- not used often   • GERD (gastroesophageal reflux disease)    • Liver mass        Past Surgical History:   Procedure Laterality Date   • KNEE SURGERY     • TONSILLECTOMY  age 16   • [de-identified] TOOTH EXTRACTION         Family History   Problem Relation Age of Onset   • No Known Problems Mother    • Coronary artery disease Father    • Testicular cancer Father    • Heart attack Father    • [place]'s disease Father    • Leukemia Father    • No Known Problems Maternal Grandmother    • No Known Problems Paternal Grandmother    • Lupus Paternal Grandfather    • No Known Problems Sister          Medications have been verified  Objective   /76   Pulse 79   Temp 98 °F (36 7 °C)   Resp 18   Ht 5' 7" (1 702 m)   Wt 73 kg (161 lb)   SpO2 100%   BMI 25 22 kg/m²   No LMP recorded  Physical Exam     Physical Exam  Constitutional:       General: She is not in acute distress  Appearance: She is well-developed  She is not diaphoretic  HENT:      Head: Normocephalic and atraumatic  Right Ear: Hearing, tympanic membrane, ear canal and external ear normal       Left Ear: Hearing, tympanic membrane, ear canal and external ear normal       Nose: Mucosal edema, congestion and rhinorrhea present  Right Sinus: Maxillary sinus tenderness present  No frontal sinus tenderness  Left Sinus: Maxillary sinus tenderness present  No frontal sinus tenderness  Mouth/Throat:      Pharynx: Oropharynx is clear  Uvula midline  Cardiovascular:      Rate and Rhythm: Normal rate and regular rhythm  Heart sounds: Normal heart sounds, S1 normal and S2 normal    Pulmonary:      Effort: Pulmonary effort is normal       Breath sounds: Normal breath sounds  Lymphadenopathy:      Cervical: No cervical adenopathy  Skin:     General: Skin is warm and dry  Neurological:      Mental Status: She is alert and oriented to person, place, and time

## 2022-12-13 ENCOUNTER — TELEPHONE (OUTPATIENT)
Dept: OBGYN CLINIC | Facility: CLINIC | Age: 27
End: 2022-12-13

## 2022-12-13 NOTE — TELEPHONE ENCOUNTER
Patient calling in regards to having concerns with irregular periods and having fertility concerns  Patient would like to know if she should be seen in person or if a phone call appt is appropriate  Please advise

## 2022-12-14 ENCOUNTER — LAB (OUTPATIENT)
Dept: LAB | Facility: CLINIC | Age: 27
End: 2022-12-14

## 2022-12-14 ENCOUNTER — OFFICE VISIT (OUTPATIENT)
Dept: OBGYN CLINIC | Facility: CLINIC | Age: 27
End: 2022-12-14

## 2022-12-14 VITALS — DIASTOLIC BLOOD PRESSURE: 82 MMHG | SYSTOLIC BLOOD PRESSURE: 126 MMHG | HEART RATE: 81 BPM

## 2022-12-14 DIAGNOSIS — N92.6 MISSED PERIOD: ICD-10-CM

## 2022-12-14 DIAGNOSIS — N91.2 AMENORRHEA: ICD-10-CM

## 2022-12-14 DIAGNOSIS — N92.6 IRREGULAR MENSES: ICD-10-CM

## 2022-12-14 DIAGNOSIS — N92.6 MISSED PERIOD: Primary | ICD-10-CM

## 2022-12-14 LAB
B-HCG SERPL-ACNC: <2 MIU/ML
ESTRADIOL SERPL-MCNC: 35 PG/ML
FSH SERPL-ACNC: 4 MIU/ML
PROLACTIN SERPL-MCNC: 5.5 NG/ML
SL AMB POCT URINE HCG: NEGATIVE
TSH SERPL DL<=0.05 MIU/L-ACNC: 2.87 UIU/ML (ref 0.45–4.5)

## 2022-12-14 NOTE — PROGRESS NOTES
Assessment/Plan:  Problem List Items Addressed This Visit    None  Visit Diagnoses     Missed period    -  Primary    Relevant Orders    hCG, quantitative    Prolactin    DHEA-sulfate    Follicle stimulating hormone    Testosterone, free, total    TSH, 3rd generation with Free T4 reflex    Estradiol    Antimullerian hormone (AMH)    Amenorrhea        Relevant Orders    hCG, quantitative    Prolactin    DHEA-sulfate    Follicle stimulating hormone    Testosterone, free, total    TSH, 3rd generation with Free T4 reflex    Estradiol    Antimullerian hormone (AMH)    Irregular menses        Relevant Orders    DHEA-sulfate    Follicle stimulating hormone    Testosterone, free, total    TSH, 3rd generation with Free T4 reflex    Estradiol    Antimullerian hormone [facility])           Patient with a missed period  Urine pregnancy test is negative in the office today  She requests additional testing as she is concerned about her irregular menses throughout the year  So far it seems like she has had 2 irregular periods  She has only been trying to get pregnant for the past month  Advised to continue to track menstrual cycles  Advised patient to call if still with missed period by next month  I advised patient to complete laboratory testing and we will call her with results  Subjective   Patient ID: Anjali Strong is a 32 y o  female  Patient is here for a problem visit  Chief Complaint   Patient presents with   • Gynecologic Exam     She has been off birth control for over 1 year  She started to try to get pregnant this month  She has missed a period  She has been getting normal periods q 30 days, lasting 5 days  LMP: 22    Weight has been stable  She has been working out consistently    She has been tracking ovulation using LH strips, it did show ovulation D 22      She reports a false positive test         Menstrual History:  OB History        0    Para   0    Term   0       0 AB   0    Living   0       SAB   0    IAB   0    Ectopic   0    Multiple   0    Live Births   0                  No LMP recorded  Past Medical History:   Diagnosis Date   • Asthma     exercise induced- not used often   • GERD (gastroesophageal reflux disease)    • Liver mass        Past Surgical History:   Procedure Laterality Date   • KNEE SURGERY     • TONSILLECTOMY  age 16   • [de-identified] TOOTH EXTRACTION         Social History     Tobacco Use   • Smoking status: Never   • Smokeless tobacco: Never   Vaping Use   • Vaping Use: Never used   Substance Use Topics   • Alcohol use: Yes     Comment: socially   • Drug use: No        Allergies   Allergen Reactions   • Levofloxacin Hives         Current Outpatient Medications:   •  Prenatal Vit-Fe Fumarate-FA (PRENATAL PO), Take by mouth, Disp: , Rfl:   •  busPIRone (BUSPAR) 10 mg tablet, Take 1 tablet (10 mg total) by mouth 3 (three) times a day (Patient not taking: Reported on 12/14/2022), Disp: 90 tablet, Rfl: 0  •  EPINEPHrine (EPIPEN) 0 3 mg/0 3 mL SOAJ, Inject 0 3 mL (0 3 mg total) into a muscle once for 1 dose, Disp: 0 6 mL, Rfl: 2  •  ergocalciferol (VITAMIN D2) 50,000 units, Take 1 capsule (50,000 Units total) by mouth once a week (Patient not taking: Reported on 12/14/2022), Disp: 4 capsule, Rfl: 2  •  fluticasone (FLONASE) 50 mcg/act nasal spray, 1 spray into each nostril daily (Patient not taking: Reported on 12/14/2022), Disp: 9 9 mL, Rfl: 0  •  multivitamin (THERAGRAN) TABS, Take 1 tablet by mouth daily (Patient not taking: Reported on 12/14/2022), Disp: , Rfl:   •  pantoprazole (PROTONIX) 40 mg tablet, Take 1 tablet (40 mg total) by mouth daily (Patient not taking: Reported on 12/14/2022), Disp: 90 tablet, Rfl: 1      Review of Systems   Constitutional: Negative  HENT: Negative  Eyes: Negative  Respiratory: Negative  Cardiovascular: Negative  Gastrointestinal: Negative  Endocrine: Negative      Genitourinary:        As noted in HPI Musculoskeletal: Negative  Skin: Negative  Allergic/Immunologic: Negative  Neurological: Negative  Hematological: Negative  Psychiatric/Behavioral: Negative  /82 (BP Location: Right arm, Patient Position: Sitting, Cuff Size: Adult)   Pulse 81       Physical Exam  Constitutional:       General: She is not in acute distress  Appearance: She is well-developed  Cardiovascular:      Rate and Rhythm: Normal rate  Pulses: Normal pulses  Neurological:      Mental Status: She is alert and oriented to person, place, and time  Skin:     General: Skin is warm and dry  Psychiatric:         Attention and Perception: Attention normal          Mood and Affect: Mood normal          Speech: Speech normal          Behavior: Behavior normal  Behavior is cooperative                   Future Appointments   Date Time Provider Elizabet Winn   1/25/2023  9:15 AM Ramila Rodriguez MD [facility]   9/29/2023  1:00 PM Amie Iqbal PA-C [facility]

## 2022-12-15 ENCOUNTER — TELEPHONE (OUTPATIENT)
Dept: OBGYN CLINIC | Facility: CLINIC | Age: 27
End: 2022-12-15

## 2022-12-15 DIAGNOSIS — N92.6 IRREGULAR MENSES: ICD-10-CM

## 2022-12-15 DIAGNOSIS — N97.0 ANOVULATORY CYCLE: Primary | ICD-10-CM

## 2022-12-15 LAB
DHEA-S SERPL-MCNC: 115 UG/DL (ref 84.8–378)
TESTOST FREE SERPL-MCNC: 1.1 PG/ML (ref 0–4.2)
TESTOST SERPL-MCNC: <3 NG/DL (ref 13–71)

## 2022-12-15 NOTE — TELEPHONE ENCOUNTER
Spoke to pt about labs, all normal,   Advised D 21 progesterone    LMP yesterday Biopsy Method: Personna blade

## 2022-12-22 LAB — MIS SERPL-MCNC: 4.38 NG/ML

## 2023-01-03 ENCOUNTER — APPOINTMENT (OUTPATIENT)
Dept: LAB | Facility: CLINIC | Age: 28
End: 2023-01-03

## 2023-01-03 DIAGNOSIS — N92.6 IRREGULAR MENSES: ICD-10-CM

## 2023-01-03 DIAGNOSIS — N97.0 ANOVULATORY CYCLE: ICD-10-CM

## 2023-01-04 LAB — PROGEST SERPL-MCNC: 8.5 NG/ML

## 2023-01-19 DIAGNOSIS — Z31.69 ENCOUNTER FOR PRECONCEPTION CONSULTATION: Primary | ICD-10-CM

## 2023-01-24 NOTE — PATIENT INSTRUCTIONS
Wellness Visit for Adults   AMBULATORY CARE:   A wellness visit  is when you see your healthcare provider to get screened for health problems  Your healthcare provider will also give you advice on how to stay healthy  Write down your questions so you remember to ask them  Ask your healthcare provider how often you should have a wellness visit  What happens at a wellness visit:  Your healthcare provider will ask about your health, and your family history of health problems  This includes high blood pressure, heart disease, and cancer  He or she will ask if you have symptoms that concern you, if you smoke, and about your mood  You may also be asked about your intake of medicines, supplements, food, and alcohol  Any of the following may be done: Your weight  will be checked  Your height may also be checked so your body mass index (BMI) can be calculated  Your BMI shows if you are at a healthy weight  Your blood pressure  and heart rate will be checked  Your temperature may also be checked  Blood and urine tests  may be done  Blood tests may be done to check your cholesterol levels  Abnormal cholesterol levels increase your risk for heart disease and stroke  You may also need a blood or urine test to check for diabetes if you are at increased risk  Urine tests may be done to look for signs of an infection or kidney disease  A physical exam  includes checking your heartbeat and lungs with a stethoscope  Your healthcare provider may also check your skin to look for sun damage  Screening tests  may be recommended  A screening test is done to check for diseases that may not cause symptoms  The screening tests you may need depend on your age, gender, family history, and lifestyle habits  For example, colorectal screening may be recommended if you are 48years old or older  Screening tests you need if you are a woman:   A Pap smear  is used to screen for cervical cancer   Pap smears are usually done every 3 to 5 years depending on your age  You may need them more often if you have had abnormal Pap smear test results in the past  Ask your healthcare provider how often you should have a Pap smear  A mammogram  is an x-ray of your breasts to screen for breast cancer  Experts recommend mammograms every 2 years starting at age 48 years  You may need a mammogram at age 52 years or younger if you have an increased risk for breast cancer  Talk to your healthcare provider about when you should start having mammograms and how often you need them  Vaccines you may need:   Get an influenza vaccine  every year  The influenza vaccine protects you from the flu  Several types of viruses cause the flu  The viruses change over time, so new vaccines are made each year  Get a tetanus-diphtheria (Td) booster vaccine  every 10 years  This vaccine protects you against tetanus and diphtheria  Tetanus is a severe infection that may cause painful muscle spasms and lockjaw  Diphtheria is a severe bacterial infection that causes a thick covering in the back of your mouth and throat  Get a human papillomavirus (HPV) vaccine  if you are female and aged 23 to 32 or male 23 to 24 and never received it  This vaccine protects you from HPV infection  HPV is the most common infection spread by sexual contact  HPV may also cause vaginal, penile, and anal cancers  Get a pneumococcal vaccine  if you are aged 72 years or older  The pneumococcal vaccine is an injection given to protect you from pneumococcal disease  Pneumococcal disease is an infection caused by pneumococcal bacteria  The infection may cause pneumonia, meningitis, or an ear infection  Get a shingles vaccine  if you are 60 or older, even if you have had shingles before  The shingles vaccine is an injection to protect you from the varicella-zoster virus  This is the same virus that causes chickenpox   Shingles is a painful rash that develops in people who had chickenpox or have been exposed to the virus  How to eat healthy:  My Plate is a model for planning healthy meals  It shows the types and amounts of foods that should go on your plate  Fruits and vegetables make up about half of your plate, and grains and protein make up the other half  A serving of dairy is included on the side of your plate  The amount of calories and serving sizes you need depends on your age, gender, weight, and height  Examples of healthy foods are listed below:  Eat a variety of vegetables  such as dark green, red, and orange vegetables  You can also include canned vegetables low in sodium (salt) and frozen vegetables without added butter or sauces  Eat a variety of fresh fruits , canned fruit in 100% juice, frozen fruit, and dried fruit  Include whole grains  At least half of the grains you eat should be whole grains  Examples include whole-wheat bread, wheat pasta, brown rice, and whole-grain cereals such as oatmeal     Eat a variety of protein foods such as seafood (fish and shellfish), lean meat, and poultry without skin (turkey and chicken)  Examples of lean meats include pork leg, shoulder, or tenderloin, and beef round, sirloin, tenderloin, and extra lean ground beef  Other protein foods include eggs and egg substitutes, beans, peas, soy products, nuts, and seeds  Choose low-fat dairy products such as skim or 1% milk or low-fat yogurt, cheese, and cottage cheese  Limit unhealthy fats  such as butter, hard margarine, and shortening  Exercise:  Exercise at least 30 minutes per day on most days of the week  Some examples of exercise include walking, biking, dancing, and swimming  You can also fit in more physical activity by taking the stairs instead of the elevator or parking farther away from stores  Include muscle strengthening activities 2 days each week  Regular exercise provides many health benefits   It helps you manage your weight, and decreases your risk for type 2 diabetes, heart disease, stroke, and high blood pressure  Exercise can also help improve your mood  Ask your healthcare provider about the best exercise plan for you  General health and safety guidelines:   Do not smoke  Nicotine and other chemicals in cigarettes and cigars can cause lung damage  Ask your healthcare provider for information if you currently smoke and need help to quit  E-cigarettes or smokeless tobacco still contain nicotine  Talk to your healthcare provider before you use these products  Limit alcohol  A drink of alcohol is 12 ounces of beer, 5 ounces of wine, or 1½ ounces of liquor  Lose weight, if needed  Being overweight increases your risk of certain health conditions  These include heart disease, high blood pressure, type 2 diabetes, and certain types of cancer  Protect your skin  Do not sunbathe or use tanning beds  Use sunscreen with a SPF 15 or higher  Apply sunscreen at least 15 minutes before you go outside  Reapply sunscreen every 2 hours  Wear protective clothing, hats, and sunglasses when you are outside  Drive safely  Always wear your seatbelt  Make sure everyone in your car wears a seatbelt  A seatbelt can save your life if you are in an accident  Do not use your cell phone when you are driving  This could distract you and cause an accident  Pull over if you need to make a call or send a text message  Practice safe sex  Use latex condoms if are sexually active and have more than one partner  Your healthcare provider may recommend screening tests for sexually transmitted infections (STIs)  Wear helmets, lifejackets, and protective gear  Always wear a helmet when you ride a bike or motorcycle, go skiing, or play sports that could cause a head injury  Wear protective equipment when you play sports  Wear a lifejacket when you are on a boat or doing water sports      © Copyright [employer] 2022 Information is for End User's use only and may not be sold, redistributed or otherwise used for commercial purposes  All illustrations and images included in CareNotes® are the copyrighted property of A D A M , Inc  or Aydee Medrano  The above information is an  only  It is not intended as medical advice for individual conditions or treatments  Talk to your doctor, nurse or pharmacist before following any medical regimen to see if it is safe and effective for you

## 2023-01-24 NOTE — PROGRESS NOTES
Assessment        Diagnoses and all orders for this visit:    Encntr for gyn exam (general) (routine) w/o abn findings    Encounter for preconception consultation    Irregular menses  -     Ambulatory Referral to Infertility; Future    Dysmenorrhea  -     US pelvis complete w transvaginal; Future    Menorrhagia with irregular cycle  -     US pelvis complete w transvaginal; Future    Other orders  -     Probiotic Product (PROBIOTIC DAILY PO); Take by mouth Gummy probiotic             Plan      All questions answered  Contraception: none  Educational material distributed  Follow up in 1 year  Follow up as needed  Pelvic ultrasound  Pap Smear not indicated  Discussed with patient heavy and painful menses, differentials including adenomyosis and endometriosis  Discussed option to referral to [facility] due to problems with menses versus continued observation and additional work-up  Patient recently had blood work that was normal   Ultrasound last year was normal and repeat ultrasound was ordered for this year due to continued symptoms  Patient scheduled for preconception counseling with MFM today    Follow-up in 1 year or as needed if positive pregnancy test    Tessa Reilly is a 32 y o  female who presents for annual exam       Chief Complaint   Patient presents with   • Gynecologic Exam       Trying to get pregnant x 4 months  She has been off birth control    Her periods are more painful and heavy,  Up to 8 tampons/day    Last Pap: 22      HPV vaccine completed:yes - 3 doses  Current contraception: condoms  History of abnormal Pap smear: yes - ASCUS  History of abnormal mammogram: no  Family history of uterine or ovarian cancer: no  Family history of breast cancer: no  Family history of colon cancer: no     Dad passed away from prostate, testicular with lung mets, leukemia      Menstrual History:  OB History        0    Para   0    Term   0       0    AB   0    Living   0 SAB   0    IAB   0    Ectopic   0    Multiple   0    Live Births   0                Menarche age: 15  No LMP recorded               Past Medical History:   Diagnosis Date   • Asthma     exercise induced- not used often   • GERD (gastroesophageal reflux disease)    • Liver mass      Past Surgical History:   Procedure Laterality Date   • KNEE SURGERY     • TONSILLECTOMY  age 16   • [de-identified] EXTRACTION       Family History   Problem Relation Age of Onset   • No Known Problems Mother    • Coronary artery disease Father    • Testicular cancer Father    • Heart attack Father    • [place]'s disease Father    • Leukemia Father    • No Known Problems Sister    • No Known Problems Maternal Grandmother    • No Known Problems Paternal Grandmother    • Lupus Paternal Grandfather    • Ovarian cancer Other        Social History     Tobacco Use   • Smoking status: Never   • Smokeless tobacco: Never   Vaping Use   • Vaping Use: Never used   Substance Use Topics   • Alcohol use: Yes     Comment: socially   • Drug use: No          Current Outpatient Medications:   •  Prenatal Vit-Fe Fumarate-FA (PRENATAL PO), Take by mouth, Disp: , Rfl:   •  Probiotic Product (PROBIOTIC DAILY PO), Take by mouth Gummy probiotic, Disp: , Rfl:   •  busPIRone (BUSPAR) 10 mg tablet, Take 1 tablet (10 mg total) by mouth 3 (three) times a day (Patient not taking: Reported on 12/14/2022), Disp: 90 tablet, Rfl: 0  •  EPINEPHrine (EPIPEN) 0 3 mg/0 3 mL SOAJ, Inject 0 3 mL (0 3 mg total) into a muscle once for 1 dose, Disp: 0 6 mL, Rfl: 2  •  ergocalciferol (VITAMIN D2) 50,000 units, Take 1 capsule (50,000 Units total) by mouth once a week (Patient not taking: Reported on 12/14/2022), Disp: 4 capsule, Rfl: 2  •  fluticasone (FLONASE) 50 mcg/act nasal spray, 1 spray into each nostril daily (Patient not taking: Reported on 12/14/2022), Disp: 9 9 mL, Rfl: 0  •  multivitamin (THERAGRAN) TABS, Take 1 tablet by mouth daily (Patient not taking: Reported on 12/14/2022), Disp: , Rfl:   •  pantoprazole (PROTONIX) 40 mg tablet, Take 1 tablet (40 mg total) by mouth daily (Patient not taking: Reported on 12/14/2022), Disp: 90 tablet, Rfl: 1    Allergies   Allergen Reactions   • Levofloxacin Hives           Review of Systems   Constitutional: Negative  HENT: Negative  Eyes: Negative  Respiratory: Negative  Cardiovascular: Negative  Gastrointestinal: Negative  Endocrine: Negative  Genitourinary:        As noted in HPI   Musculoskeletal: Negative  Skin: Negative  Allergic/Immunologic: Negative  Neurological: Negative  Hematological: Negative  Psychiatric/Behavioral: Negative  /78 (BP Location: Right arm, Patient Position: Sitting, Cuff Size: Adult)   Pulse 68   Ht 5' 7" (1 702 m)   Wt 73 5 kg (162 lb)   BMI 25 37 kg/m²         Physical Exam  Constitutional:       Appearance: She is well-developed  Genitourinary:      Vulva, bladder and rectum normal       No lesions in the vagina  Genitourinary Comments:         Right Labia: No rash, tenderness, lesions, skin changes or Bartholin's cyst      Left Labia: No tenderness, lesions, skin changes, Bartholin's cyst or rash  No inguinal adenopathy present in the right or left side  No vaginal discharge, tenderness or bleeding  No vaginal prolapse present  No vaginal atrophy present  Right Adnexa: not tender, not full and no mass present  Left Adnexa: not tender, not full and no mass present  No cervical motion tenderness, friability, lesion or polyp  Uterus is not enlarged or tender  Pelvic exam was performed with patient in the lithotomy position  Rectum:      No external hemorrhoid  Breasts:     Right: No mass, nipple discharge, skin change or tenderness  Left: No mass, nipple discharge, skin change or tenderness  HENT:      Head: Normocephalic        Nose: Nose normal    Eyes:      Conjunctiva/sclera: Conjunctivae normal  Neck:      Thyroid: No thyromegaly  Cardiovascular:      Rate and Rhythm: Normal rate and regular rhythm  Heart sounds: Normal heart sounds  No murmur heard  Pulmonary:      Effort: Pulmonary effort is normal  No respiratory distress  Breath sounds: Normal breath sounds  No wheezing or rales  Abdominal:      General: There is no distension  Palpations: Abdomen is soft  There is no mass  Tenderness: There is no abdominal tenderness  There is no guarding or rebound  Musculoskeletal:         General: No tenderness  Cervical back: Neck supple  No muscular tenderness  Lymphadenopathy:      Cervical: No cervical adenopathy  Lower Body: No right inguinal adenopathy  No left inguinal adenopathy  Neurological:      Mental Status: She is alert and oriented to person, place, and time  Skin:     General: Skin is warm and dry     Psychiatric:         Mood and Affect: Mood normal          Behavior: Behavior normal              Future Appointments   Date Time Provider Elizabet Winn   1/25/2023  2:30 PM [address]   9/29/2023  1:00 PM Amie Iqbal PA-C [facility]

## 2023-01-25 ENCOUNTER — ANNUAL EXAM (OUTPATIENT)
Dept: OBGYN CLINIC | Facility: CLINIC | Age: 28
End: 2023-01-25

## 2023-01-25 ENCOUNTER — TELEMEDICINE (OUTPATIENT)
Dept: PERINATAL CARE | Facility: OTHER | Age: 28
End: 2023-01-25

## 2023-01-25 VITALS
HEIGHT: 67 IN | BODY MASS INDEX: 25.43 KG/M2 | WEIGHT: 162 LBS | DIASTOLIC BLOOD PRESSURE: 78 MMHG | HEART RATE: 68 BPM | SYSTOLIC BLOOD PRESSURE: 122 MMHG

## 2023-01-25 DIAGNOSIS — Z82.49 FAMILY HISTORY OF THROMBOEMBOLIC DISEASE: Primary | ICD-10-CM

## 2023-01-25 DIAGNOSIS — Z31.69 ENCOUNTER FOR PRECONCEPTION CONSULTATION: ICD-10-CM

## 2023-01-25 DIAGNOSIS — Z01.419 ENCNTR FOR GYN EXAM (GENERAL) (ROUTINE) W/O ABN FINDINGS: Primary | ICD-10-CM

## 2023-01-25 DIAGNOSIS — N94.6 DYSMENORRHEA: ICD-10-CM

## 2023-01-25 DIAGNOSIS — N92.6 IRREGULAR MENSES: ICD-10-CM

## 2023-01-25 DIAGNOSIS — Z82.49 FAMILY HISTORY OF CARDIOVASCULAR DISEASE: ICD-10-CM

## 2023-01-25 DIAGNOSIS — N92.1 MENORRHAGIA WITH IRREGULAR CYCLE: ICD-10-CM

## 2023-01-25 PROBLEM — Z31.438 ENCOUNTER FOR OTHER GENETIC TESTING OF FEMALE FOR PROCREATIVE MANAGEMENT: Status: ACTIVE | Noted: 2023-01-25

## 2023-01-25 NOTE — LETTER
2023     Ariel Waldron [address] [facility],[address]  [address]    Patient: Valerie Holguin   YOB: 1995   Date of Visit: 2023       Dear Dr Tyson Class: Thank you for referring Valerie Holguin to me for evaluation  Below are my notes for this consultation  If you have questions, please do not hesitate to call me  I look forward to following your patient along with you  Sincerely,        Tobias Griffin MD        CC: No Recipients  Tobias Griffin MD  2023  9:13 AM  Sign when Signing Visit  PRECONCEPTION CONSULTATION: MATERNAL-FETAL MEDICINE     Virtual Regular Visit    Verification of patient location: Valerie Holguin is located in the following state in which I hold an active license [place]  The patient was identified by name and date of birth  She was informed that this is a telemedicine visit and that the visit is being conducted through the [address] Now platform  She agrees to proceed  My office door was closed  No one else was in the room  She acknowledged consent and understanding of privacy and security of the video platform  The patient has agreed to participate and understands they can discontinue the visit at any time  Patient is aware this is a billable service  Assessment and Plan: Ms Karolyn Frye is a 32y o  year-old  here for preconception counseling  By issue:    Problem List Items Addressed This Visit        Other    Family history of thromboembolic disease - Primary     First degree affected relative so offered thrombophilia screening  Relevant Orders    Antithrombin III Activity    Factor 5 leiden    Protein C activity    Protein S activity    Prothrombin gene mutation    Encounter for preconception consultation     · Not yet successful trying to conceive for the past 4 months but has been off contraception for years and cycles are irregular    · Has referral to [facility] and will go in nonurgent fashion  · Taking prenatal vitamins  · Exercises regularly and worked hard to bring BMI down from 28 to 25  · Offered carrier screening which she accepted  · She is well aware of schedule of routine OB care, aneuploidy screening and ultrasounds  Relevant Orders    Cystic fibrosis gene test    Hemoglobin Electrophoresis    SMA Carrier Screen    Family history of cardiovascular disease     Her father's cardiac history preceded his other health problems and was at a young age  She is young and healthy with low likelihood of cardiac problems however would be worthwhile at some point to meet with cardiologist to discuss long term cardiac health and see if any workup needs to be done for her father's history of cardiomyopathy  Referral offered and provided  Relevant Orders    Ambulatory Referral to Cardiology           Referring physician:   [facility] [id number]  [address]    Reason for consultation: trying to conceive, review medical and family history  Dear Dr Jonah Batxer,    Thank you for referring patient Lissy Arellano for preconception Maternal-Fetal Medicine consultation  As you know, Ms Hampton Galeazzi is a 32y o  year-old   Her history is as follows:    Past Medical History:   Diagnosis Date   • Anemia     iron deficiency   • Asthma     exercise induced, inhalers not used, has effectively resolved with more exercise   • GERD (gastroesophageal reflux disease)    • Liver mass     focal nodular hyperplasia; 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; still present, last seen 4 years ago; best on MRI         Past Surgical History:   Procedure Laterality Date   • KNEE SURGERY     • TONSILLECTOMY  age 16   • WISDOM TOOTH EXTRACTION         OB History    Para Term  AB Living   0 0 0 0 0 0   SAB IAB Ectopic Multiple Live Births   0 0 0 0 0       Gynecologic history: Patient's last menstrual period was 2023 (exact date)  Highly irregular cycles  Social History     Tobacco Use   • Smoking status: Never   • Smokeless tobacco: Never   Vaping Use   • Vaping Use: Never used   Substance Use Topics   • Alcohol use: Yes     Comment: Social   • Drug use: No       Family History   Problem Relation Age of Onset   • No Known Problems Mother    • Coronary artery disease Father    • Testicular cancer Father    • Heart attack Father         Multiple   • Royce's disease Father    • Leukemia Father    • Cancer Father         Testicular, chest mass, leukemia   • Deep vein thrombosis Father         One occurrence approximately 200? • Heart disease Father    • No Known Problems Sister    • No Known Problems Maternal Grandmother    • Ovarian cancer Paternal Grandmother    • Lupus Paternal Grandfather    • Ovarian cancer Other        Family history per our office screening tool includes her father with VTE, and her great grandmother with ovarian cancer but is negative for Ashkenazi [employer] ancestry, birth defects, diabetes; Down syndrome or other chromosome problem, genetic condition or carrier state for cystic fibrosis, sickle cell, thalassemia, Caleb-Sachs, or spinal muscular atrophy; hemophilia or von Willebrand's disease; preeclampsia or hypertension, or opiate use disorder/addiction or overdose  Her late father has a complex medical history  Longstanding history of heart problems  Had 2 MIs in his early 35s  In her childhood he had a VTE and was on longterm warfarin  Required cardiac stents  He had numerous cancers though VTEs preceded cancers  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), treated with radiation then removed; during surgery he had cardiac arrest   At one point he had lung metastases  His health stabilized for 5-6 years  Next cancer was prostate cancer then leukemia (both diagnosed late in their course) the latter with the [place] gene    He did spend time at [facility] x2 years so she is unsure how much of his cancer was attributable to that exposure  He also had Royce's disease and his father had lupus (his father  of complications of lupus)  Current Outpatient Medications:   •  EPINEPHrine (EPIPEN) 0 3 mg/0 3 mL SOAJ, Inject 0 3 mL (0 3 mg total) into a muscle once for 1 dose, Disp: 0 6 mL, Rfl: 2  •  Prenatal Vit-Fe Fumarate-FA (PRENATAL PO), Take by mouth, Disp: , Rfl:   •  Probiotic Product (PROBIOTIC DAILY PO), Take by mouth Gummy probiotic, Disp: , Rfl:     Allergies   Allergen Reactions   • Levofloxacin Hives     Occupation: sonographer in maternal-fetal medicine  Spouse/Partner Name: Scott Rodriguez; Age 28; occupation: business owner  HPI    Review of Systems   Constitutional: Negative for chills, fever and unexpected weight change  HENT: Negative for congestion, dental problem, facial swelling and sore throat  Eyes: Negative for visual disturbance  Respiratory: Negative for cough and shortness of breath  Cardiovascular: Negative for chest pain and palpitations  Gastrointestinal: Negative for diarrhea and vomiting  Endocrine: Negative for polydipsia  Genitourinary: Negative for dysuria and vaginal bleeding  Musculoskeletal: Negative for back pain and joint swelling  Skin: Negative for rash and wound  Allergic/Immunologic: Negative for immunocompromised state  Neurological: Negative for seizures and headaches  Hematological: Does not bruise/bleed easily  Psychiatric/Behavioral: Negative for hallucinations and suicidal ideas  Vitals: Last menstrual period 2023  Physical Exam  Constitutional:       General: She is not in acute distress  Appearance: Normal appearance  She is not ill-appearing, toxic-appearing or diaphoretic  HENT:      Head: Normocephalic and atraumatic  Nose: No congestion or rhinorrhea  Eyes:      General: No scleral icterus  Right eye: No discharge  Left eye: No discharge  Extraocular Movements: Extraocular movements intact  Conjunctiva/sclera: Conjunctivae normal    Pulmonary:      Effort: Pulmonary effort is normal  No respiratory distress  Musculoskeletal:      Cervical back: Normal range of motion  Skin:     Coloration: Skin is not jaundiced or pale  Findings: No erythema, lesion or rash  Neurological:      General: No focal deficit present  Mental Status: She is alert and oriented to person, place, and time  Psychiatric:         Mood and Affect: Mood normal          Behavior: Behavior normal          -------------------------    The total time spent on this new patient on the encounter date was 59 minutes  This time included previsit service time of  7 minutes reviewing records and precharting, face-to-face (via virtual platform) service time of  47 minutes counseling regarding results and coordinating care, and post-service time of  5 minutes charting  At the conclusion of today's encounter, all questions were answered to her satisfaction  We will see her back once pregnant or sooner if requested/desired  Thank you very much for this kind referral and please do not hesitate to contact me with any further questions or concerns      Sincerely,    Jesusita Romberg, MD  Attending Physician, [place]

## 2023-01-25 NOTE — PROGRESS NOTES
PRECONCEPTION CONSULTATION: MATERNAL-FETAL MEDICINE     Virtual Regular Visit    Verification of patient location: Tamra Quinn is located in the following state in which I hold an active license [place]  The patient was identified by name and date of birth  She was informed that this is a telemedicine visit and that the visit is being conducted through the [address] Now platform  She agrees to proceed  My office door was closed  No one else was in the room  She acknowledged consent and understanding of privacy and security of the video platform  The patient has agreed to participate and understands they can discontinue the visit at any time  Patient is aware this is a billable service  Assessment and Plan: Ms Esperanza Grigsby is a 32y o  year-old  here for preconception counseling  By issue:    Problem List Items Addressed This Visit        Other    Family history of thromboembolic disease - Primary     First degree affected relative so offered thrombophilia screening  Relevant Orders    Antithrombin III Activity    Factor 5 leiden    Protein C activity    Protein S activity    Prothrombin gene mutation    Encounter for preconception consultation     · Not yet successful trying to conceive for the past 4 months but has been off contraception for years and cycles are irregular  · Has referral to [facility] and will go in nonurgent fashion  · Taking prenatal vitamins  · Exercises regularly and worked hard to bring BMI down from 28 to 25  · Offered carrier screening which she accepted  · She is well aware of schedule of routine OB care, aneuploidy screening and ultrasounds  Relevant Orders    Cystic fibrosis gene test    Hemoglobin Electrophoresis    SMA Carrier Screen    Family history of cardiovascular disease     Her father's cardiac history preceded his other health problems and was at a young age    She is young and healthy with low likelihood of cardiac problems however would be worthwhile at some point to meet with cardiologist to discuss long term cardiac health and see if any workup needs to be done for her father's history of cardiomyopathy  Referral offered and provided  Relevant Orders    Ambulatory Referral to Cardiology           Referring physician:   Dorina Meckel, Md [id number]  [place],  [address] [place] [address]    Reason for consultation: trying to conceive, review medical and family history  Dear Dr Aniceto Marquez,    Thank you for referring patient Saniya Horton for preconception Maternal-Fetal Medicine consultation  As you know, Ms Buck Franz is a 32y o  year-old   Her history is as follows:    Past Medical History:   Diagnosis Date   • Anemia     iron deficiency   • Asthma     exercise induced, inhalers not used, has effectively resolved with more exercise   • GERD (gastroesophageal reflux disease)    • Liver mass     focal nodular hyperplasia; 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; still present, last seen 4 years ago; best on MRI  Past Surgical History:   Procedure Laterality Date   • KNEE SURGERY     • TONSILLECTOMY  age 16   • WISDOM TOOTH EXTRACTION         OB History    Para Term  AB Living   0 0 0 0 0 0   SAB IAB Ectopic Multiple Live Births   0 0 0 0 0       Gynecologic history: Patient's last menstrual period was 2023 (exact date)  Highly irregular cycles      Social History     Tobacco Use   • Smoking status: Never   • Smokeless tobacco: Never   Vaping Use   • Vaping Use: Never used   Substance Use Topics   • Alcohol use: Yes     Comment: Social   • Drug use: No       Family History   Problem Relation Age of Onset   • No Known Problems Mother    • Coronary artery disease Father    • Testicular cancer Father    • Heart attack Father         Multiple   • Royce's disease Father    • Leukemia Father    • Cancer Father         Testicular, chest mass, leukemia   • Deep vein thrombosis Father One occurrence approximately ? • Heart disease Father    • No Known Problems Sister    • No Known Problems Maternal Grandmother    • Ovarian cancer Paternal Grandmother    • Lupus Paternal Grandfather    • Ovarian cancer Other        Family history per our office screening tool includes her father with VTE, and her great grandmother with ovarian cancer but is negative for Ashkenazi Bahai ancestry, birth defects, diabetes; Down syndrome or other chromosome problem, genetic condition or carrier state for cystic fibrosis, sickle cell, thalassemia, Caleb-Sachs, or spinal muscular atrophy; hemophilia or von Willebrand's disease; preeclampsia or hypertension, or opiate use disorder/addiction or overdose  Her late father has a complex medical history  Longstanding history of heart problems  Had 2 MIs in his early 35s  In her childhood he had a VTE and was on longterm warfarin  Required cardiac stents  He had numerous cancers though VTEs preceded cancers  First cancer () was testicular which metastasized to his chest (pericardial sac), treated with radiation then removed; during surgery he had cardiac arrest   At one point he had lung metastases  His health stabilized for 5-6 years  Next cancer was prostate cancer then leukemia (both diagnosed late in their course) the latter with the [place] gene  He did [facility] x2 years so she is unsure how much of his cancer was attributable to that exposure  He also had Garland's disease and his father had lupus (his father  of complications of lupus)          Current Outpatient Medications:   •  EPINEPHrine (EPIPEN) 0 3 mg/0 3 mL SOAJ, Inject 0 3 mL (0 3 mg total) into a muscle once for 1 dose, Disp: 0 6 mL, Rfl: 2  •  Prenatal Vit-Fe Fumarate-FA (PRENATAL PO), Take by mouth, Disp: , Rfl:   •  Probiotic Product (PROBIOTIC DAILY PO), Take by mouth Gummy probiotic, Disp: , Rfl:     Allergies   Allergen Reactions   • Levofloxacin Hives     Occupation: sonographer in maternal-fetal medicine  Spouse/Partner Name: Carmen Mon; Age 28; occupation: business owner  HPI    Review of Systems   Constitutional: Negative for chills, fever and unexpected weight change  HENT: Negative for congestion, dental problem, facial swelling and sore throat  Eyes: Negative for visual disturbance  Respiratory: Negative for cough and shortness of breath  Cardiovascular: Negative for chest pain and palpitations  Gastrointestinal: Negative for diarrhea and vomiting  Endocrine: Negative for polydipsia  Genitourinary: Negative for dysuria and vaginal bleeding  Musculoskeletal: Negative for back pain and joint swelling  Skin: Negative for rash and wound  Allergic/Immunologic: Negative for immunocompromised state  Neurological: Negative for seizures and headaches  Hematological: Does not bruise/bleed easily  Psychiatric/Behavioral: Negative for hallucinations and suicidal ideas  Vitals: Last menstrual period 01/14/2023  Physical Exam  Constitutional:       General: She is not in acute distress  Appearance: Normal appearance  She is not ill-appearing, toxic-appearing or diaphoretic  HENT:      Head: Normocephalic and atraumatic  Nose: No congestion or rhinorrhea  Eyes:      General: No scleral icterus  Right eye: No discharge  Left eye: No discharge  Extraocular Movements: Extraocular movements intact  Conjunctiva/sclera: Conjunctivae normal    Pulmonary:      Effort: Pulmonary effort is normal  No respiratory distress  Musculoskeletal:      Cervical back: Normal range of motion  Skin:     Coloration: Skin is not jaundiced or pale  Findings: No erythema, lesion or rash  Neurological:      General: No focal deficit present  Mental Status: She is alert and oriented to person, place, and time     Psychiatric:         Mood and Affect: Mood normal          Behavior: Behavior normal  -------------------------    The total time spent on this new patient on the encounter date was 59 minutes  This time included previsit service time of  7 minutes reviewing records and precharting, face-to-face (via virtual platform) service time of  47 minutes counseling regarding results and coordinating care, and post-service time of  5 minutes charting  At the conclusion of today's encounter, all questions were answered to her satisfaction  We will see her back once pregnant or sooner if requested/desired  Thank you very much for this kind referral and please do not hesitate to contact me with any further questions or concerns      Sincerely,    Mary Grey MD  Attending Physician, [place]

## 2023-01-26 ENCOUNTER — APPOINTMENT (OUTPATIENT)
Dept: LAB | Facility: CLINIC | Age: 28
End: 2023-01-26

## 2023-01-26 DIAGNOSIS — Z31.69 ENCOUNTER FOR PRECONCEPTION CONSULTATION: ICD-10-CM

## 2023-01-26 DIAGNOSIS — Z82.49 FAMILY HISTORY OF THROMBOEMBOLIC DISEASE: ICD-10-CM

## 2023-01-27 LAB — DEPRECATED AT III PPP: 99 % OF NORMAL (ref 92–136)

## 2023-01-30 LAB
F5 GENE MUT ANL BLD/T: NORMAL
PROT C AG ACT/NOR PPP IA: 123 % OF NORMAL (ref 60–150)
PROT S ACT/NOR PPP: 83 % (ref 68–108)

## 2023-01-31 LAB
F2 GENE MUT ANL BLD/T: NORMAL
HGB A MFR BLD: 2.6 % (ref 1.8–3.2)
HGB A MFR BLD: 97.4 % (ref 96.4–98.8)
HGB F MFR BLD: 0 % (ref 0–2)
HGB FRACT BLD-IMP: NORMAL
HGB S MFR BLD: 0 %

## 2023-02-01 LAB
CF COMMENT: NORMAL
CFTR MUT ANL BLD/T: NORMAL

## 2023-02-03 LAB
CLINICAL INFO: NORMAL
ETHNIC BACKGROUND STATED: NORMAL
GENE MUT TESTED BLD/T: NORMAL
GENERAL COMMENTS:: NORMAL
LAB DIRECTOR NAME PROVIDER: NORMAL
REASON FOR REFERRAL (NARRATIVE): NORMAL
REF LAB TEST METHOD: NORMAL
SL AMB DISCLAIMER: NORMAL
SL AMB GENETIC COUNSELOR: NORMAL
SMN1 GENE MUT ANL BLD/T: NORMAL
SPECIMEN SOURCE: NORMAL

## 2023-02-18 ENCOUNTER — HOSPITAL ENCOUNTER (OUTPATIENT)
Dept: ULTRASOUND IMAGING | Facility: HOSPITAL | Age: 28
Discharge: HOME | End: 2023-02-18
Attending: OBSTETRICS & GYNECOLOGY

## 2023-02-18 DIAGNOSIS — N92.1 MENORRHAGIA WITH IRREGULAR CYCLE: ICD-10-CM

## 2023-02-18 DIAGNOSIS — N94.6 DYSMENORRHEA: ICD-10-CM

## 2023-02-20 ENCOUNTER — TELEPHONE (OUTPATIENT)
Dept: OBGYN CLINIC | Facility: CLINIC | Age: 28
End: 2023-02-20

## 2023-02-20 NOTE — TELEPHONE ENCOUNTER
Reached out to radiology and left message  Left patients information and number with any other questions

## 2023-02-20 NOTE — TELEPHONE ENCOUNTER
Patient calling In regards to her u/s  Patient was not sure if it should be read sooner due to her having appt with fertility specialist on wed  Patien noticed abnormal things during her u/s  Please advise

## 2023-02-27 ENCOUNTER — APPOINTMENT (OUTPATIENT)
Dept: LAB | Facility: CLINIC | Age: 28
End: 2023-02-27

## 2023-02-27 DIAGNOSIS — Z11.59 SCREENING EXAMINATION FOR POLIOMYELITIS: ICD-10-CM

## 2023-02-27 DIAGNOSIS — Z13.0 SCREENING FOR IRON DEFICIENCY ANEMIA: ICD-10-CM

## 2023-02-27 DIAGNOSIS — Z11.4 SCREENING FOR HUMAN IMMUNODEFICIENCY VIRUS: ICD-10-CM

## 2023-02-27 DIAGNOSIS — Z11.3 SCREENING EXAMINATION FOR VENEREAL DISEASE: ICD-10-CM

## 2023-02-27 DIAGNOSIS — N92.6 IRREGULAR MENSTRUAL CYCLE: ICD-10-CM

## 2023-02-27 DIAGNOSIS — Z01.83 ENCOUNTER FOR BLOOD TYPING: ICD-10-CM

## 2023-02-27 DIAGNOSIS — Z31.41 FERTILITY TESTING: ICD-10-CM

## 2023-02-27 LAB
ABO GROUP BLD: NORMAL
BLD GP AB SCN SERPL QL: NEGATIVE
ERYTHROCYTE [DISTWIDTH] IN BLOOD BY AUTOMATED COUNT: 12.1 % (ref 11.6–15.1)
HBV SURFACE AG SER QL: NORMAL
HCT VFR BLD AUTO: 41.7 % (ref 34.8–46.1)
HGB BLD-MCNC: 13.4 G/DL (ref 11.5–15.4)
MCH RBC QN AUTO: 30.2 PG (ref 26.8–34.3)
MCHC RBC AUTO-ENTMCNC: 32.1 G/DL (ref 31.4–37.4)
MCV RBC AUTO: 94 FL (ref 82–98)
PLATELET # BLD AUTO: 311 THOUSANDS/UL (ref 149–390)
PMV BLD AUTO: 10.4 FL (ref 8.9–12.7)
PROLACTIN SERPL-MCNC: 6.6 NG/ML
RBC # BLD AUTO: 4.43 MILLION/UL (ref 3.81–5.12)
RH BLD: POSITIVE
RUBV IGG SERPL IA-ACNC: 24.9 IU/ML
TSH SERPL DL<=0.05 MIU/L-ACNC: 2.38 UIU/ML (ref 0.45–4.5)
WBC # BLD AUTO: 5.66 THOUSAND/UL (ref 4.31–10.16)

## 2023-02-28 LAB
C TRACH DNA SPEC QL NAA+PROBE: NEGATIVE
HCV AB S/CO SERPL IA: NON REACTIVE
N GONORRHOEA DNA SPEC QL NAA+PROBE: NEGATIVE
SL AMB INTERPRETATION: NORMAL

## 2023-03-01 LAB
HIV 1+2 AB+HIV1 P24 AG SERPL QL IA: NORMAL
HIV 2 AB SERPL QL IA: NORMAL
HIV1 AB SERPL QL IA: NORMAL
HIV1 P24 AG SERPL QL IA: NORMAL
TREPONEMA PALLIDUM IGG+IGM AB [PRESENCE] IN SERUM OR PLASMA BY IMMUNOASSAY: NORMAL
VZV IGG SER QL IA: NORMAL

## 2023-03-09 ENCOUNTER — APPOINTMENT (OUTPATIENT)
Dept: LAB | Facility: CLINIC | Age: 28
End: 2023-03-09

## 2023-03-09 ENCOUNTER — TELEPHONE (OUTPATIENT)
Dept: OBGYN CLINIC | Facility: CLINIC | Age: 28
End: 2023-03-09

## 2023-03-09 DIAGNOSIS — N92.6 IRREGULAR MENSES: Primary | ICD-10-CM

## 2023-03-09 DIAGNOSIS — N92.6 IRREGULAR MENSES: ICD-10-CM

## 2023-03-09 LAB
B-HCG SERPL-ACNC: <2 MIU/ML
PROGEST SERPL-MCNC: 9.4 NG/ML

## 2023-03-09 NOTE — TELEPHONE ENCOUNTER
Patient requesting labs to be ordered  Spoke with Dr Elizabeth Hernandez, orders placed  Patient aware

## 2023-03-13 ENCOUNTER — DOCUMENTATION (OUTPATIENT)
Age: 28
End: 2023-03-13

## 2023-03-13 ENCOUNTER — APPOINTMENT (OUTPATIENT)
Dept: LAB | Facility: CLINIC | Age: 28
End: 2023-03-13

## 2023-03-13 DIAGNOSIS — Z32.01 POSITIVE PREGNANCY TEST: Primary | ICD-10-CM

## 2023-03-13 DIAGNOSIS — Z34.90 EARLY STAGE OF PREGNANCY: Primary | ICD-10-CM

## 2023-03-13 DIAGNOSIS — Z32.01 POSITIVE PREGNANCY TEST: ICD-10-CM

## 2023-03-13 LAB
B-HCG SERPL-ACNC: 18 MIU/ML
PROGEST SERPL-MCNC: 16.1 NG/ML

## 2023-03-15 ENCOUNTER — LAB (OUTPATIENT)
Dept: LAB | Facility: CLINIC | Age: 28
End: 2023-03-15

## 2023-03-15 DIAGNOSIS — Z34.90 EARLY STAGE OF PREGNANCY: ICD-10-CM

## 2023-03-15 LAB
B-HCG SERPL-ACNC: 63 MIU/ML
PROGEST SERPL-MCNC: 23.6 NG/ML

## 2023-03-21 ENCOUNTER — LAB (OUTPATIENT)
Dept: LAB | Facility: CLINIC | Age: 28
End: 2023-03-21

## 2023-03-21 DIAGNOSIS — Z34.91 FIRST TRIMESTER PREGNANCY: Primary | ICD-10-CM

## 2023-03-21 DIAGNOSIS — Z34.90 EARLY STAGE OF PREGNANCY: ICD-10-CM

## 2023-03-21 LAB
B-HCG SERPL-ACNC: 763 MIU/ML
PROGEST SERPL-MCNC: 21.1 NG/ML

## 2023-03-27 ENCOUNTER — APPOINTMENT (OUTPATIENT)
Dept: LAB | Facility: CLINIC | Age: 28
End: 2023-03-27

## 2023-03-27 ENCOUNTER — LAB (OUTPATIENT)
Dept: LAB | Facility: CLINIC | Age: 28
End: 2023-03-27

## 2023-03-27 DIAGNOSIS — Z34.91 FIRST TRIMESTER PREGNANCY: Primary | ICD-10-CM

## 2023-03-27 DIAGNOSIS — Z00.8 ENCOUNTER FOR OTHER GENERAL EXAMINATION: ICD-10-CM

## 2023-03-27 LAB
B-HCG SERPL-ACNC: 7555 MIU/ML (ref 0–11.6)
CHOLEST SERPL-MCNC: 144 MG/DL
EST. AVERAGE GLUCOSE BLD GHB EST-MCNC: 100 MG/DL
HBA1C MFR BLD: 5.1 %
HDLC SERPL-MCNC: 53 MG/DL
LDLC SERPL CALC-MCNC: 69 MG/DL (ref 0–100)
NONHDLC SERPL-MCNC: 91 MG/DL
PROGEST SERPL-MCNC: 13.7 NG/ML
TRIGL SERPL-MCNC: 111 MG/DL

## 2023-03-27 RX ORDER — PROGESTERONE 200 MG/1
200 CAPSULE ORAL DAILY
Qty: 30 CAPSULE | Refills: 2 | Status: SHIPPED | OUTPATIENT
Start: 2023-03-27 | End: 2023-06-25

## 2023-03-29 DIAGNOSIS — R79.89 LOW SERUM PROGESTERONE: Primary | ICD-10-CM

## 2023-03-31 ENCOUNTER — APPOINTMENT (OUTPATIENT)
Dept: LAB | Facility: CLINIC | Age: 28
End: 2023-03-31

## 2023-03-31 DIAGNOSIS — R79.89 LOW SERUM PROGESTERONE: ICD-10-CM

## 2023-03-31 LAB — PROGEST SERPL-MCNC: 23.2 NG/ML

## 2023-04-05 ENCOUNTER — OFFICE VISIT (OUTPATIENT)
Dept: OBGYN CLINIC | Facility: CLINIC | Age: 28
End: 2023-04-05

## 2023-04-05 VITALS
HEIGHT: 67 IN | WEIGHT: 163 LBS | SYSTOLIC BLOOD PRESSURE: 116 MMHG | DIASTOLIC BLOOD PRESSURE: 64 MMHG | HEART RATE: 85 BPM | BODY MASS INDEX: 25.58 KG/M2

## 2023-04-05 DIAGNOSIS — Z3A.01 7 WEEKS GESTATION OF PREGNANCY: Primary | ICD-10-CM

## 2023-04-05 DIAGNOSIS — Z36.89 CONFIRM FETAL CARDIAC ACTIVITY USING ULTRASOUND: ICD-10-CM

## 2023-04-05 RX ORDER — VITAMIN B COMPLEX
TABLET ORAL
COMMUNITY

## 2023-04-05 NOTE — PATIENT INSTRUCTIONS
To download a copy of pregnancy essentials guide:    [email or web address]        [facility] [address], [address]  [address], [address]    Warning Signs During Pregnancy  The list below includes warning signs your providers would like you to be aware of  If you experience any of these at any time during your pregnancy, please call us as soon as possible  Vaginal bleeding   Sharp abdominal pain that does not go away   Fever (more than 100  4? F and is not relieved with Tylenol)   Persistent vomiting lasting greater than 24 hours   Chest pain   Pain or burning when you urinate   Severe headache that doesn’t resolve with Tylenol   Blurred vision or seeing spots in your vision   Sudden swelling of your face or hands   Redness, swelling or pain in a leg   A sudden weight gain in just a few days   Decrease in your baby’s movements (after 28 weeks or the 6th month of pregnancy)   A loss of watery fluid from your vagina - can be a gush, a trickle or  continuous wetness   After 20 weeks of pregnancy, rhythmic cramping (greater than 4 per hour)  or menstrual like low/pelvic pain    Call the OFFICE [phone] for any questions/emergencies  At night or on the weekend, please indicate it is an emergency and the DOCTOR on call will be paged      Discomforts of Early Pregnancy    Tips for coping with nausea and  vomiting during pregnancy   Eat meals and snacks slowly   Eat every 1-2 hours to avoid a full stomach   Don’t skip meals, avoid empty stomach   Eat a snack prior to getting out of bed   Avoid food and beverages with a strong aroma   Avoid dehydration - drink enough fluid to keep the urine pale yellow   Drink fluids before a meal to minimize the effect of a full stomach   Limit the amount of coffee and beverages that contain caffeine   Eliminate spicy, odorous, high fat (fried foods), acidic (tomato products) and sweet foods   Fluids that contain lemon (lemonade), mint (tea) or orange can usually be well tolerated   Snacks and meals that contain low-fat protein (lean meats, fish, poultry and eggs) along with eating easily digestible carbs (fruit, rice, toast, crackers and dry cereal) may be tolerated better   Foods with ginger may be well tolerated  May use ginger root powder, capsules or extract (up to 1000 mg per day)   Drink liquids in small amounts    If symptoms persist, please contact your provider  Tips for coping with constipation during pregnancy   Increase fiber and fluids   - Drink 8-10 cups of liquid, like water or low-sugar juice daily  - Keep urine pale with fluids (water, milk), fruit and vegetables   Eat a well-balanced diet that contains high fiber food (fruits, vegetables, whole grain breads and cereals, bran and dried beans)   Take a 30-minute walk daily   You may take a mild stool softener such as Colace®    If symptoms persist, please contact your provider  For any emergencies, PLEASE CALL THE OFFICE at [phone]  If the office is closed, the doctor on call will be paged by the answering service  Medications and Pregnancy    Colds/Sore Throat   Robitussin DMR - Plain (guaifenesin)   Saline nasal spray   Warm salt water gargle   CepacolR throat lozenges or mouthwash (cetylpyridinium chloride)   SucretsR (hexylresoricinol, dyclonone HC1)   SudafedR (pseudoephedrine, phenylephnine reformulated) for colds or congestion  May take after 13 weeks of pregnancy  Allergy  Avoid the “D” - or Decongestant   ClaritinR (loratadine)   ZrytecR (cetirizine)   AllegraR (fexofenadine)    Headache Campbell Solian and Pains   TylenolR (acetaminophen)  Do not exceed more than 3000 mg of Tylenol in a 24-hour period      Heartburn   MylantaR (aluminum hydroxide/simethicone, magnesium hydroxide)   MaaloxR (aluminum magnesium hydroxide, magnesium hydroxide)   TumsR (calcium carbonate)   RiopanR (magaldrate)    Constipation ColaceR (docusate sodium)   SurfakR (docusate calcium)   MiraLAXR   Glycerin suppositories   FleetsR enema (sodium phosphate &sodium biphosphate)  Avoid enemas in the third trimester if you have had  labor contractions  Nausea /Vomiting   Vitamin B6 (pyridoxine) - May take 50 mg at bedtime, 25 mg in the am, 25 mg in the afternoon   UnisomR (doxylamine) - May use for nausea/vomiting - take 12 5 mg every 8 hours as needed  (cut a 25 mg tablet in half)  May cause drowsiness  Ginger capsules daily - Total 1000 mg daily    Sleep   BenadrylR (diphenhydramine) - Take 1-2 tablets as needed at bedtime   UnisomR (doxylamine) 25 mg tablet - As needed at bedtime   Melatonin 5 mg tablet - As needed at bedtime    Expected Weight Gain During Pregnancy  If you have a healthy BMI (18-25) prior to pregnancy:  The recommended weight gain is between 25-35 pounds  Approximate weight gain  in the first trimester is 1-4 5 pounds  An expected weight gain during the second and  third trimester is approximately one pound per week  If you have a BMI of less than 18 prior to pregnancy,  you are considered underweight:  The recommended weight gain is between 28-40 pounds  Approximate weight gain  in the first trimester is 1-4 5 pounds  An expected weight gain during the second and  third trimester is just over one pound per week  If your BMI is 25 to 29 9: you are considered overweight:  You should gain 1/2 to 2/3 pound during the second and third trimester, for a total  weight gain of 15 to 25 pounds  If you have a BMI of greater than 30 prior to pregnancy,  you are considered overweight:  The recommended weight gain is between 15-25 pounds  Approximate weight gain  in the first trimester is 1-4 5 pounds  An expected weight gain during the second  and third trimester is approximately 0 5 pound per week      Foods to avoid during pregnancy:   Unpasteurized milk, juice and cheese  - Soft cheeses like feta or brie (if made with UNPASTEURIZED milk)   Unheated deli meats like lunchmeat and hotdogs   Undercooked poultry, beef, pork, seafood including raw sushi    What fish is safe to eat during pregnancy? Eat 8 to 12 ounces of fish a week  Pick from this group frequently, especially if you follow  the American Heart Association’s recommendation to eat fish at least 2 times a week  AVOID HIGH MERCURY FISH  A single meal of the following fish can put  you over the Environmental Protection  Agency’s safe limit for the month  High mercury fish:  Shark  Swordfish  [employer]  Tile Fish    Caffeine and Pregnancy    The  and [employer] of Obstetrics and Gynecologists (ACOG) urge pregnant  women to limit their caffeine consumption to no more than 200 milligrams (mg) per day  This is  comparable to having one 12-ounce cup of coffee a day  This level has been shown not to increase risk  of miscarriage, growth or  labor complications  Effects of higher levels are not known  Exercise During Pregnancy  A daily exercise program that consists of 30 minutes a day is recommended  Low impact exercises like walking and swimming are great exercises throughout  all of pregnancy   If you’re an avid strength  avoid lifting very heavy weights - nothing more  than 30 pounds    Drink plenty of fluids while exercising to stay hydrated  Be careful to avoid overheating  ACTIVITIES TO AVOID   Exercises that can make you lose your balance  Activities that can put your baby at risk i e  horseback riding, scuba diving, skiing  or snowboarding  Any other sport that puts you at risk for getting hit in the  abdominal area  Do not use saunas, steam rooms or hot tubs (that have a higher temperature  than 100F)   After the first trimester, avoid exercises that require you to lay flat on your back  Avoid exceeding a heart rate greater than 140 beats per minute   As long as you are  able to hold a conversation while exercising your heart rate is likely acceptable    Vaccines and Pregnancy    Information for pregnant women  Vaccines help protect you and your baby against serious diseases  Whooping Cough Vaccine  Whooping cough (or pertussis) can be serious for anyone, but for your , it can be lifethreatening  Up to 20 babies die each year in the [facility] due to whooping cough  When you get the whooping cough vaccine during your pregnancy, your body will create protective  antibodies and pass some of them to your baby before birth  These antibodies will provide your  baby some short-term, early protection against whooping cough  Learn more at www cdc gov/pertussis/pregnant/     Flu Vaccine  Changes in your immune, heart, and lung functions during pregnancy make you more likely to get  seriously ill from the flu  Catching the flu also increases your chances for serious problems for your  developing baby, including premature labor and delivery  Get the flu shot if you are pregnant during  flu season--it’s the best way to protect yourself and your baby for several months after birth from flu-related  complications  Flu seasons vary in their timing from season to season, but CDC recommends getting vaccinated  by the end of October, if possible  This timing helps protect you before flu activity begins to increase  Find more on how to prevent the flu by visiting www cdc gov/flu/        Labor  Learning the signs and symptoms of  labor may help your baby from being born too early   labor means that you have started the  labor process before the 37th week of pregnancy  Babies born before the 37th week of pregnancy may need to stay in the hospital longer and may  have additional health problems      The signs of  labor   Abdominal tightening - more than 4-6 in an hour   Vaginal bleeding, increase vaginal discharge or a change in color of your vaginal discharge   Increased pelvic pressure - feeling like the baby is pushing down   Low, dull backache - may be a backache that comes and goes   Menstrual type cramping    If you experience any one of the signs of  labor, drink  1 quart of water  If you have no improvement in 1 hour, call the office  Acting quickly is the best thing that you can do for your baby  The office staff may have the following suggestions: You may be asked to come in to the office or go to the hospital   You may be told to stop the activity that you had previously been doing   Rest with your feet up for 1 hour   Drink 2-3 glasses of water or juice - dehydration can cause     After speaking with the office staff and following their advice, if you’re still  experiencing the signs of  labor, call again  If the signs of   labor have subsided, rest for the remainder of the day  Fetal kick counts    You will most likely start to feel your baby move (also known as quickening) between  18 and 20 weeks of pregnancy  First time moms might feel their baby’s movements  closer to 25 weeks while a second time mom might feel those first movements closer  to 16 weeks  At 28 weeks counting your baby’s movements is one way of knowing that your baby  is doing well  A healthy baby usually moves (kicks, flutters, or rolls) at least 10 times in  2 hours during an active time, once a day  This is called a kick count  How do I count my baby’s movements? Choose one time of day when your baby is most active  Try to do this around the same time each day  Get into a comfortable position (lie down on your side or sit in a chair with your feet up)  The first time that the baby moves, write down the time  Count each movement until the baby moves 10 times  These movements include kicks, punches, nudges, flutters, or rolls  This can take from 5 minutes to 2 hours  Write down the time you feel the baby’s 10th movement  What should I do if I can’t feel my baby move?   Call the office right away if you notice:   Your baby has not moved 10 times in the 2 hour time period   A significant decrease in your baby’s movements   Your baby has not moved all day    How Will I Know if I’m in Labor? Abdominal contractions will be strong and regular  The contractions will be strong enough that it will be difficult to walk, talk or breathe through them  Your water breaks - may be a gush or a slow leak  - To differentiate between amniotic fluid or urine perform a kegel exercise  - If it’s urine, when performing a kegel, the flow of urine will stop  - If it’s amniotic fluid, when performing a kegel, the flow of fluid will not stop     For a first time mom, think of the 511 rule    - Your contractions are 5 minutes apart  - The contractions last 1 minutes each  - The contractions have been in that pattern for 1 hour   For a mom who has been through the birth process before  - Contractions that occur every 7-8 minutes apart and are becoming progressively  more uncomfortable    Please always call the office prior to going to the hospital

## 2023-04-05 NOTE — PROGRESS NOTES
Assessment/Plan     Diagnoses and all orders for this visit:    7 weeks gestation of pregnancy  -     Ambulatory Referral to Maternal Fetal Medicine; Future  -     HIV 1/2 AG/AB w Reflex [facility] for 2 yr old and above; Future  -     UA (URINE) with reflex to Scope  -     Urine culture; Future  -     Hepatitis B surface antigen; Future  -     CBC and differential; Future  -     Rubella antibody, IgG; Future  -     Type and screen; Future  -     RPR-Syphilis Screening (Total Syphilis IGG/IGM); Future  -     Hepatitis B surface antibody; Future  -     Anemia Panel w/Reflex, OB; Future  -     AMB US OB < 14 weeks single or first gestation level 1    Confirm fetal cardiac activity using ultrasound  -     Ambulatory Referral to Maternal Fetal Medicine; Future  -     AMB US OB < 14 weeks single or first gestation level 1    Other orders  -     cholecalciferol (VITAMIN D3) 25 mcg (1,000 units) tablet  -     Coenzyme Q10 (CoQ10) 400 MG CAPS; 300 mg        Viable intrauterine pregnancy consistent with last menstrual period  Was previously on coenzyme q10 to improve fertility and was asked to discontinue this for now    She was referred to maternal-fetal medicine for first trimester ultrasound and genetic testing if desired  Patient previously had carrier screening  Prenatal panel was ordered  Return in 2 weeks for repeat ultrasound for dating and viability due to due to early gestational age  To complete OB intake and return in 2 weeks for physical exam    Discussed with patient exercise in pregnancy as well as nutrition in pregnancy and avoidance of certain foods      Tessa   Kristie Stein is an 32 y o  woman who presents for pregnancy confirmation  Chief Complaint   Patient presents with   • Gynecologic Exam     Pregnancy confirmation  LMP 2/14/23        Patient is here for a pregnancy confirmation  Patient's last menstrual period was 02/14/2023 (exact date)    7 0/7  Estimated Date of Delivery: 6       She denies vaginal bleeding or pelvic pain  She has no nausea or vomiting  She is taking prenatal vitamins  Her first positive pregnancy test was on *  Her periods are irregular    OB History    Para Term  AB Living   0 0 0 0 0 0   SAB IAB Ectopic Multiple Live Births   0 0 0 0 0       Past Medical History:   Diagnosis Date   • Anemia     iron deficiency   • Asthma     exercise induced, inhalers not used, has effectively resolved with more exercise   • GERD (gastroesophageal reflux disease)    • Liver mass     focal nodular hyperplasia; 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; still present, last seen 4 years ago; best on MRI         Past Surgical History:   Procedure Laterality Date   • KNEE SURGERY     • TONSILLECTOMY  age 16   • WISDOM TOOTH EXTRACTION         Social History     Tobacco Use   • Smoking status: Never   • Smokeless tobacco: Never   Vaping Use   • Vaping Use: Never used   Substance Use Topics   • Alcohol use: Yes     Comment: Social   • Drug use: No       Allergies   Allergen Reactions   • Levofloxacin Hives         Current Outpatient Medications:   •  Prenatal Vit-Fe Fumarate-FA (PRENATAL PO), Take by mouth, Disp: , Rfl:   •  Probiotic Product (PROBIOTIC DAILY PO), Take by mouth Gummy probiotic, Disp: , Rfl:   •  Progesterone 200 MG CAPS, Take 200 mg by mouth in the morning, Disp: 30 capsule, Rfl: 2  •  cholecalciferol (VITAMIN D3) 25 mcg (1,000 units) tablet, , Disp: , Rfl:   •  Coenzyme Q10 (CoQ10) 400 MG CAPS, 300 mg, Disp: , Rfl:   •  EPINEPHrine (EPIPEN) 0 3 mg/0 3 mL SOAJ, Inject 0 3 mL (0 3 mg total) into a muscle once for 1 dose, Disp: 0 6 mL, Rfl: 2    The following portions of the patient's history were reviewed and updated as appropriate: allergies, current medications, past family history, past medical history, past social history, past surgical history and problem list       Review of Systems    /64 (BP Location: Right arm, Patient "Position: Sitting, Cuff Size: Adult)   Pulse 85   Ht 5' 7\" (1 702 m)   Wt 73 9 kg (163 lb)   LMP 02/14/2023 (Exact Date)   BMI 25 53 kg/m²     OBGyn Exam      FIRST TRIMESTER OBSTETRIC ULTRASOUND  04/05/23    Michael Armstrong MD       INDICATION: Amenorrhea, viability    COMPARISON: None  TECHNIQUE:   Transvaginal imaging was performed to assess the gestation, myometrial/endometrial architecture and ovarian parenchymal detail  The study includes volumetric sweeps and traditional still imaging technique  FINDINGS:     A single intrauterine gestation is identified  Cardiac activity is detected at 140 bpm       YOLK SAC:  Present and normal in size and appearance  MEAN CROWN RUMP LENGTH:  8 4 mm = 6 weeks 5 days   AMNIOTIC FLUID/SAC SHAPE:  Within expected normal range  UTERUS/ADNEXA:   Simple left ovarian cyst measuring 1 87 cm x 3 19 x 3 07  Normal right ovary  No free fluid identified  IMPRESSION:     Single intrauterine pregnancy of 6 weeks 5 days gestational age  EDITA by [address] [facility] [facility] 11/24/23  Fetal cardiac activity detected  Assigning a Final EDITA    The gestational age by LMP is </= 8w 6d and demonstrates 5 or fewer days difference from the gestational age by [facility], therefore the final EDITA will be based on the LMP    Final EDITA: 11/21/23 by LMP  Ultrasound Probe Disinfection    A transvaginal ultrasound was performed  Prior to use, disinfection was performed with High Level Disinfection Process ([employer])  Probe serial number F: [id number] was used  Michael Armstrong MD  04/05/23  4:11 PM                      Counseling / Coordination of Care  Total time spent today30 minutes  minutes  Greater than 50% of total time was spent with the patient and / or family counseling and / or coordination of care           "

## 2023-04-06 PROBLEM — Z31.438 ENCOUNTER FOR OTHER GENETIC TESTING OF FEMALE FOR PROCREATIVE MANAGEMENT: Status: RESOLVED | Noted: 2023-01-25 | Resolved: 2023-04-06

## 2023-04-06 PROBLEM — Z31.69 ENCOUNTER FOR PRECONCEPTION CONSULTATION: Status: RESOLVED | Noted: 2023-01-25 | Resolved: 2023-04-06

## 2023-04-06 NOTE — PROGRESS NOTES
The patient was identified by name and date of birth  The patient was informed that this is a telephone visit per her request   She agrees to proceed     My office door was closed  No one else was in the room  She acknowledged consent and understanding of privacy and security of the video platform  The patient has agreed to participate and understands they can discontinue the visit at any time  32 y o  y/o female here for OB intake  The patient was oriented to practice  Patient's medical, genetic, surgical and family history were reviewed in detail  Diagnostic, genetic & carrier testing discussed, and the patient is aware this testing may not be covered by her insurance  TV u/s done 4/5/23 confirms SLIUP  consistent with 2/14/23 LMP, final EDC 11/21/23 by LMP  Genetic screen:  Canavan disease- negative  Cerebral palsy- negative  Cleft lip/palate- negative  Congenital anomalies- negative  Congenital heart disease- negative  Consanguinity- negative  Cystic fibrosis- negative  Down's syndrome- FOB's cousin  Hemophilia- negative  Webster's chorea- negative  Mental retardation/ intellectual disability/ cognitive delays- negative  Muscular dystrophy- negative  Neural tube defect- negative  Sickle cell anemia- negative  Caleb-sachs disease- negative  Fragile X- negative  Thalassemia- negative  Autism- negative  Type 1 diabetes- negative  PKU- negative  Premature ovarian failure- negative      Age of patient: 32  Age of father of the baby: 28, Mary Lanes    Exposure risk:  Occupation: US tech at [employer]   Exposure chemicals/radiation:no  Alcohol exposure: no  Medications taking since LMP:no  Drug exposure:no  Smoker: no  Cat litter exposure: no    Depression screen:  negative  Domestic violence screen: negative      ZIKA screening:  Travel/ or plans for travel outside / [place] area/ [place]:  no,  zika precautions given    TB screening:   ? HIV-no  ?  Close contact with individuals with known or suspected TB-no  ? Medical conditions known to increase risk of disease if infected  ? Ie  Diabetes, lupus, cancer, alcoholism, drug addiction- no  ? Birth in or emigration from high prevalent countries- no  ? Medically underserved- no  ? Homeless- no  ? Living or working in long term care facilities - no    DRUG screening:  Parents:  Did any of your parents have a problem with alcohol or other drug use?  no    Partner: Does your partner have a problem with alcohol or drug use? no    Past: In the past, have you had difficulties in your life because of alcohol or other drugs, including prescription medication? no    Present:  In the past month have you drunk any alcohol or used other drugs?  no    Thyroid disease risk:  BMI >30: no  Type 1 diabetes: no  Fhx or personal hx of thyroid disease: no    Diabetes risk screening:   BMI 30 or greater: no  Hx of macrosomia ( infant weight 9 lb or greater): n/a  Previous GDM hx: no  Hx PCOS or insulin resistance: no  Hx of elevated HgbA1c, glucose tolerance, fasting glucose: no    HTN: no  Hx elevated HDL/ triglycerides:no  1st degree relative with diabetes: no  Hx cardiovascular ds: no   [place], , native [place], [place], : no    BMI 25 or greater: yes  BMI 23 or greater and  american: no    Glucola ordered: no    Other screening:  Ashkenazi Yazidi/ [place] Cajun descent: no, [facility] screening offered: no    Hx of gastric bypass/ gastric sleeve/ gastric band: no    Hx of HSV for patient or partner: no    Hx of MRSA: no    Last pap: 1/2022  Hx of abnormal pap smear: no  Hx of procedures to the cervix: no    Hx of chlamydia/ gonorrhea/ PID: no    Hx recurrent pregnancy loss/ stillbirth: no    Was this pregnancy a result of infertility treatment: no    Vaccine Hx:  Varicella: + disease  Flu vaccine:  completed  Hep B vaccine: yes   COVID vaccine :  completed x 2    Hx of covid in last 3 months: no    ASA/ PEC screen:  Previous uncomplicated full-term delivery: no  Multifetal gestation- 2 points: 0  Chronic HTN- 2 points: 0  Type 1 or 2 pre-gestational diabetes- 2 points: 0  Renal disease- 2 points: 0  Autoimmune disease- 2 points: 0  History of preeclampsia- 2 points: 0  IVF pregnancy- 1 point: 0  >10 year pregnancy interval-1 point: 0  Previous pregnancy with IUGR/ SGA/ adverse pregnancy outcome-1 point: 0  Nulliparity- 1 point: 1  BMI >30- 1 point: no  Family history of preeclampsia in mother or sister- 1 point: no  Age >or = 28- 1 point: 0   Race- 1 point: 0  Low socioeconomic status-1 point: 0    TOTAL SCORE: 1      Pt has been recommended to take ASA 162mg during this pregnancy to lower her risk of preeclampsia: no    Other:  Pre pregnancy weight: 162lb    Ok with blood transfusion if needed: yes    Plans for feeding baby: yes    Blood type: unknown    Hemoglobin electrophoresis completed in past: yes    Preferred lab: [facility]    ONAF form needed: no    Nausea: no  Vomiting: no  severe cramping/ pain: no  Bleeding since pregnant: no  Urinary complaints: no  Fever or rash since pregnant: no      Current Outpatient Medications on File Prior to Visit   Medication Sig Dispense Refill   • cholecalciferol (VITAMIN D3) 25 mcg (1,000 units) tablet      • EPINEPHrine (EPIPEN) 0 3 mg/0 3 mL SOAJ Inject 0 3 mL (0 3 mg total) into a muscle once for 1 dose 0 6 mL 2   • Prenatal Vit-Fe Fumarate-FA (PRENATAL PO) Take by mouth     • Probiotic Product (PROBIOTIC DAILY PO) Take by mouth Gummy probiotic     • Progesterone 200 MG CAPS Take 200 mg by mouth in the morning 30 capsule 2   • [DISCONTINUED] Coenzyme Q10 (CoQ10) 400 MG CAPS 300 mg       No current facility-administered medications on file prior to visit         Past Surgical History:   Procedure Laterality Date   • KNEE SURGERY     • TONSILLECTOMY  age 16   • WISDOM TOOTH EXTRACTION         Past Medical History:   Diagnosis Date   • Anemia     iron deficiency   • Asthma exercise induced, inhalers not used, has effectively resolved with more exercise   • GERD (gastroesophageal reflux disease)    • Liver mass     focal nodular hyperplasia; 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; still present, last seen 4 years ago; best on MRI  OB History        0    Para   0    Term   0       0    AB   0    Living   0       SAB   0    IAB   0    Ectopic   0    Multiple   0    Live Births   0                   1  Pregnancy: H&P completed today  Prenatal course discussed with patient, including appointment schedule  Warning signs discussed and reviewed  OB folder  to be given with warning signs of pregnancy, prenatal visit schedule, safe medications in pregnancy, childbirth classes, lab location info, MF info  2  Genetic testing: nuchal translucency/Sequential screening/ NIPT reviewed with patient - appt with MFM 23    3  Carrier screening including Cystic fibrosis and spinal muscular atrophy reviewed: Pt had negative CF and SMA carrier testing 2023    4  OB exam: to be completed 23, see other encounter    5  Prenatal labs ordered today:  prenatal panel, hepatitis C    6  Liver mass:  Has been stable    No hx of abnormal LFTs       NEEDS:   Gonorrhea and Chlamydia testing

## 2023-04-07 ENCOUNTER — TELEPHONE (OUTPATIENT)
Dept: HEMATOLOGY ONCOLOGY | Facility: CLINIC | Age: 28
End: 2023-04-07

## 2023-04-07 ENCOUNTER — TELEMEDICINE (OUTPATIENT)
Age: 28
End: 2023-04-07

## 2023-04-07 DIAGNOSIS — Z34.00 SUPERVISION OF NORMAL FIRST PREGNANCY, ANTEPARTUM: Primary | ICD-10-CM

## 2023-04-07 DIAGNOSIS — Z80.41 FAMILY HISTORY OF OVARIAN CANCER: ICD-10-CM

## 2023-04-07 DIAGNOSIS — Z3A.01 7 WEEKS GESTATION OF PREGNANCY: ICD-10-CM

## 2023-04-07 NOTE — PATIENT INSTRUCTIONS
"Again, congratulations on your pregnancy! NEXT STEPS  Get Prenatal bloodwork if not already done  Call [facility] to schedule next ultrasound (done 12-13 wks)   Contact information for [facility] (AKA Maternal Fetal Medicine)- Main Number [phone]   Return to our office in 4 weeks for routine prenatal visit (or sooner if any problems/concerns arise- see packet for things to report)  Check out [employer] website to read the [employer]"      It can be found by going to [employer]-->select services-->select women's health-->select Obstetrics  [email or web address] [email or web address]  ----------------------------------------------------  [facility] [address], [address]  [address] [facility] [place], [address]    Warning Signs During Pregnancy  The list below includes warning signs your providers would like you to be aware of  If you experience any of these at any time during your pregnancy, please call us as soon as possible  Vaginal bleeding   Sharp abdominal pain that does not go away   Fever (more than 100  4? F and is not relieved with Tylenol)   Persistent vomiting lasting greater than 24 hours   Chest pain/Shortness of breath   Pain or burning when you urinate    Call the OFFICE [phone] for any questions/emergencies  At night or on the weekend, calls go through a triage service, please indicate it is an emergency and the DOCTOR on call will be paged    ---------------------------------------------------------------    Discomforts of Early Pregnancy  Tips for coping with nausea and vomiting during pregnancy   Eat meals and snacks slowly   Eat every 1-2 hours to avoid a full stomach   Don’t skip meals, avoid empty stomach   Eat a snack prior to getting out of bed   Avoid food and beverages with a strong aroma   Avoid dehydration - drink enough fluid to keep the urine pale yellow   Drink fluids before a meal to minimize " the effect of a full stomach   Limit the amount of coffee and beverages that contain caffeine   Eliminate spicy, odorous, high fat (fried foods), acidic (tomato products) and sweet foods   Fluids that contain lemon (lemonade), mint (tea) or orange can usually be well tolerated   Snacks and meals that contain low-fat protein (lean meats, fish, poultry and eggs) along with eating easily digestible carbs (fruit, rice, toast, crackers and dry cereal) may be tolerated better   Foods with ginger may be well tolerated  May use ginger root powder, capsules or extract (up to 1000 mg per day)   Drink liquids in small amounts    If symptoms persist, please contact your provider  Tips for coping with constipation during pregnancy   Increase fiber and fluids   - Drink 8-10 cups of liquid, like water or low-sugar juice daily  - Keep urine pale with fluids (water, milk), fruit and vegetables   Eat a well-balanced diet that contains high fiber food (fruits, vegetables, whole grain breads and cereals, bran and dried beans)   Take a 30-minute walk daily   You may take a mild stool softener such as Colace®    If symptoms persist, please contact your provider  For any emergencies, PLEASE CALL THE OFFICE at [phone]  If the office is closed, the doctor on call will be paged by the answering service  Medications and Pregnancy- see handout in packetExpected Weight Gain During Pregnancy  If you have a healthy BMI (18-25) prior to pregnancy:  The recommended weight gain is between 25-35 pounds  Approximate weight gain  in the first trimester is 1-4 5 pounds  An expected weight gain during the second and  third trimester is approximately one pound per week  If you have a BMI of less than 18 prior to pregnancy,  you are considered underweight:  The recommended weight gain is between 28-40 pounds  Approximate weight gain  in the first trimester is 1-4 5 pounds   An expected weight gain during the second and  third trimester is just over one pound per week  If your BMI is 25 to 29 9: you are considered overweight:  You should gain 1/2 to 2/3 pound during the second and third trimester, for a total  weight gain of 15 to 25 pounds  If you have a BMI of greater than 30 prior to pregnancy,  you are considered overweight:  The recommended weight gain is between 15-25 pounds  Approximate weight gain  in the first trimester is 1-4 5 pounds  An expected weight gain during the second  and third trimester is approximately 0 5 pound per week  Foods to avoid during pregnancy:   Unpasteurized milk, juice and cheese  - Soft cheeses like feta or brie (if made with UNPASTEURIZED milk)   Unheated deli meats like lunchmeat and hotdogs   Undercooked poultry, beef, pork, seafood including raw sushi    What fish is safe to eat during pregnancy? Eat 8 to 12 ounces of fish a week  Pick from this group frequently, especially if you follow  the American Heart Association’s recommendation to eat fish at least 2 times a week  AVOID HIGH MERCURY FISH  A single meal of the following fish can put  you over the Environmental Protection  Agency’s safe limit for the month  High mercury fish:  Shark  Swordfish  [employer]  Tile Fish    Caffeine and Pregnancy  The  and [facility] of Obstetrics and Gynecologists (ACOG) urge pregnant  women to limit their caffeine consumption to no more than 200 milligrams (mg) per day  This is  comparable to having one 12-ounce cup of coffee a day  This level has been shown not to increase risk  of miscarriage, growth or  labor complications  Effects of higher levels are not known  Exercise During Pregnancy  A daily exercise program that consists of 30 minutes a day is recommended     Low impact exercises like walking and swimming are great exercises throughout  all of pregnancy   If you’re an avid strength  avoid lifting very heavy weights - nothing more  than 30 pounds    Drink plenty of fluids while exercising to stay hydrated  Be careful to avoid overheating  ACTIVITIES TO AVOID   Exercises that can make you lose your balance  Activities that can put your baby at risk i e  horseback riding, scuba diving, skiing  or snowboarding  Any other sport that puts you at risk for getting hit in the  abdominal area  Do not use saunas, steam rooms or hot tubs (that have a higher temperature  than 100F)   After the first trimester, avoid exercises that require you to lay flat on your back  Avoid exceeding a heart rate greater than 140 beats per minute  As long as you are  able to hold a conversation while exercising your heart rate is likely acceptable    Vaccines and Pregnancy    Information for pregnant women  Vaccines help protect you and your baby against serious diseases  Whooping Cough Vaccine  Whooping cough (or pertussis) can be serious for anyone, but for your , it can be lifethreatening  Up to 20 babies die each year in the [facility] due to whooping cough  When you get the whooping cough vaccine during your pregnancy, your body will create protective  antibodies and pass some of them to your baby before birth  These antibodies will provide your  baby some short-term, early protection against whooping cough  Learn more at www cdc gov/pertussis/pregnant/     Flu Vaccine  Changes in your immune, heart, and lung functions during pregnancy make you more likely to get  seriously ill from the flu  Catching the flu also increases your chances for serious problems for your  developing baby, including premature labor and delivery  Get the flu shot if you are pregnant during  flu season--it’s the best way to protect yourself and your baby for several months after birth from flu-related  complications  Flu seasons vary in their timing from season to season, but CDC recommends getting vaccinated  by the end of October, if possible   This timing helps protect you before flu activity begins to increase  Find more on how to prevent the flu by visiting www cdc gov/flu/     Covid Vaccine  Similar to the flu, Changes in your immune, heart, and lung functions during pregnancy make you more likely to get  seriously ill from COVID  It  also increases your chances for serious problems for your  developing baby, including premature labor and delivery  Please consider getting your covid vaccine if you haven't already  This is endorsed by both Lizeth Cherry of Obstetrics and Gynecology and Susy Brown of Maternal Fetal Medicine    Fetal Activity  You will most likely start to feel your baby move (also known as quickening) between  25 and 20 weeks of pregnancy  First time moms might feel their baby’s movements  closer to 25 weeks while a second time mom might feel those first movements closer  to 16 weeks

## 2023-04-07 NOTE — TELEPHONE ENCOUNTER
I called Lexi Castaneda to schedule a new patient appointment with the Cancer Risk and Genetics Program       Outcome:   I left a voice message encouraging the patient to call the [facility] [place] at [phone] to schedule this appointment  A mychart message was also send with our contact information  Follow-up:   At this time the referral will be closed and we will wait to hear back from the patient regarding scheduling this appointment 
No

## 2023-04-16 PROBLEM — Z3A.09 9 WEEKS GESTATION OF PREGNANCY: Status: ACTIVE | Noted: 2023-04-07

## 2023-04-27 ENCOUNTER — OFFICE VISIT (OUTPATIENT)
Dept: PERINATAL CARE | Facility: OTHER | Age: 28
End: 2023-04-27

## 2023-04-27 DIAGNOSIS — Z3A.10 10 WEEKS GESTATION OF PREGNANCY: ICD-10-CM

## 2023-04-27 DIAGNOSIS — Z33.1 PREGNANT STATE, INCIDENTAL: ICD-10-CM

## 2023-04-27 DIAGNOSIS — Z36.9 UNSPECIFIED ANTENATAL SCREENING: ICD-10-CM

## 2023-04-27 NOTE — PROGRESS NOTES
"Patient chose to have [facility] Non-invasive Prenatal Screen with fetal sex  Patient given brochure and is aware [facility] will contact their insurance and coordinate coverage  Patient made aware she will need to respond to text message or e-mail from [employer] within 2 business days or testing will be run through insurance  Patient informed text message will come from area code  \"415\"  Provided [employer] # [phone] and web site : [email or web address]  \"[place] your test online\" card with barcode and test tube ID provided to patient  Reviewed [facility]'s web site states 5-7 business days for results via their portal    [employer] message will be sent to patient when MFM receives results /provider reviews  2 vials of blood drawn from LEFT arm by Steph Bloom RN  Patient tolerated blood draw without difficulty  Specimens labeled with patient identifiers (name, date of birth, specimen collection date), order and specimen were verified with patient, packed and sent via [employer]  Copy of lab order scanned to Epic media  Maternal Fetal Medicine will have results in approximately 7-10 business days and will call patient or notify via [address]  Patient aware viewing lab result online will reveal fetal sex if ordered  Patient verbalized understanding of all instructions and no questions at this time      "

## 2023-05-12 DIAGNOSIS — L25.5 RHUS DERMATITIS: Primary | ICD-10-CM

## 2023-05-12 PROBLEM — Z3A.12 12 WEEKS GESTATION OF PREGNANCY: Status: ACTIVE | Noted: 2023-04-07

## 2023-05-12 RX ORDER — TRIAMCINOLONE ACETONIDE 1 MG/G
CREAM TOPICAL 2 TIMES DAILY
Qty: 30 G | Refills: 0 | Status: SHIPPED | OUTPATIENT
Start: 2023-05-12

## 2023-05-12 NOTE — PROGRESS NOTES
"OB/GYN  PN Visit  Nita Smith  [id number]  5/15/2023  2:28 PM  Dr Parker Diaz MD    S: 32 y o  Martin Mayer here for PN visit  Chief Complaint   Patient presents with   • Routine Prenatal Visit         OB complaints:    She has a rash  She has no bleeding or pain  Nausea, no vomiting      O:  /72 (BP Location: Right arm, Patient Position: Sitting, Cuff Size: Adult)   Pulse 82   Ht 5' 7\" (1 702 m)   Wt 74 8 kg (165 lb)   LMP 2023 (Exact Date)   BMI 25 84 kg/m²       Review of Systems   Constitutional: Negative  HENT: Negative  Eyes: Negative  Respiratory: Negative  Cardiovascular: Negative  Gastrointestinal: Negative  Endocrine: Negative  Genitourinary:        As noted in HPI   Musculoskeletal: Negative  Skin: Negative  Allergic/Immunologic: Negative  Neurological: Negative  Hematological: Negative  Psychiatric/Behavioral: Negative  Physical Exam  Constitutional:       General: She is not in acute distress  Appearance: She is well-developed  Abdominal:      Palpations: Abdomen is soft  Tenderness: There is no abdominal tenderness  There is no guarding  Neurological:      Mental Status: She is alert and oriented to person, place, and time  Skin:     General: Skin is warm and dry  Findings: Rash present            Psychiatric:         Behavior: Behavior normal              Pregravid Weight/BMI: 73 5 kg (162 lb) (BMI 25 37)  Current Weight: 74 8 kg (165 lb)   Total Weight Gain: 1 361 kg (3 lb)   Pre-Carlos Vitals    Flowsheet Row Most Recent Value   Prenatal Assessment    Fetal Heart Rate 153   Prenatal Vitals    Blood Pressure 118/72   Weight - Scale 74 8 kg (165 lb)   Urine Albumin/Glucose    Dilation/Effacement/Station    Vaginal Drainage    Edema            Problem List        Digestive    Focal nodular hyperplasia of liver       Other    Seasonal allergies    Liver mass    Overview     No hx of abnormal LFTs         " Fatigue    SALLIE (generalized anxiety disorder)    Grief reaction    Family history of thromboembolic disease    Overview     Thrombophilia labs: negative         Family history of cardiovascular disease    Supervision of normal first pregnancy, antepartum    Overview       CF/ SMA testing: negative  Flu vaccine:  completed  Covid vaccine:  completed X 2           12 weeks gestation of pregnancy   Other Visit Diagnoses     First trimester pregnancy    -  Primary         Patient has [facility] appointment for first trimester ultrasound today  NIPT is normal   aFP was ordered  Patient with a rash on her lower abdomen that is concerning for shingles per urgent care  Examination reveals rash around dermatomal distribution    Is taking acyclovir  Herpes zoster virus PCR still pending  Pt Specifically concerned about CMV  CMV antibodies were previously ordered    Follow-up in 4 weeks    Future Appointments   Date Time Provider Elizabet Winn   5/15/2023  2:30 PM  [facility]   2023  4:30 PM  [facility] [address] [facility]   2023  2:30 PM   [place] [id number]   2023  1:00 PM Jordon Valenzuela PA-C [facility]Bal[facility] Garnica MD  5/15/2023  2:28 PM

## 2023-05-12 NOTE — PATIENT INSTRUCTIONS
Pregnancy at [address] to [address]:   You are now at the end of your first trimester and entering your second trimester  Morning sickness usually goes away by this time  You may have other symptoms such as fatigue, frequent urination, and headaches  You may have gained 2 to 4 pounds by now  DISCHARGE INSTRUCTIONS:   Return to the emergency department if:   You have pain or cramping in your abdomen or low back  You have heavy vaginal bleeding or clotting  You pass material that looks like tissue or large clots  Collect the material and bring it with you  Call your doctor or obstetrician if:   You cannot keep food or drinks down, and you are losing weight  You have light bleeding  You have chills or a fever  You have vaginal itching, burning, or pain  You have yellow, green, white, or foul-smelling vaginal discharge  You have pain or burning when you urinate, less urine than usual, or pink or bloody urine  You have questions or concerns about your condition or care  How to care for yourself at this stage of your pregnancy:       Get plenty of rest   You may feel more tired than normal  You may need to take naps or go to bed earlier  Manage nausea and vomiting  Avoid fatty and spicy foods  Eat small meals throughout the day instead of large meals  Kirstie may help to decrease nausea  Ask your healthcare provider about other ways of decreasing nausea and vomiting  Eat a variety of healthy foods  Healthy foods include fruits, vegetables, whole-grain breads, low-fat dairy foods, beans, lean meats, and fish  Drink liquids as directed  Ask how much liquid to drink each day and which liquids are best for you  Limit caffeine to less than 200 milligrams each day  Limit your intake of fish to 2 servings each week  Choose fish low in mercury such as canned light tuna, shrimp, salmon, cod, or tilapia   Do not  eat fish high in mercury such as swordfish, tilefish, man mackerel, and shark  Take prenatal vitamins as directed  Your need for certain vitamins and minerals, such as folic acid, increases during pregnancy  Prenatal vitamins provide some of the extra vitamins and minerals you need  Prenatal vitamins may also help to decrease the risk of certain birth defects  Do not smoke  Smoking increases your risk of a miscarriage and other health problems during your pregnancy  Smoking can cause your baby to be born too early or weigh less at birth  Ask your healthcare provider for information if you need help quitting  Do not drink alcohol  Alcohol passes from your body to your baby through the placenta  It can affect your baby's brain development and cause fetal alcohol syndrome (FAS)  FAS is a group of conditions that causes mental, behavior, and growth problems  Talk to your healthcare provider before you take any medicines  Many medicines may harm your baby if you take them when you are pregnant  Do not take any medicines, vitamins, herbs, or supplements without first talking to your healthcare provider  Never use illegal or street drugs (such as marijuana or cocaine) while you are pregnant  Safety tips during pregnancy:   Avoid hot tubs and saunas  Do not use a hot tub or sauna while you are pregnant, especially during your first trimester  Hot tubs and saunas may raise your baby's temperature and increase the risk of birth defects  Avoid toxoplasmosis  This is an infection caused by eating raw meat or being around infected cat feces  It can cause birth defects, miscarriages, and other problems  Wash your hands after you touch raw meat  Make sure any meat is well-cooked before you eat it  Avoid raw eggs and unpasteurized milk  Use gloves or ask someone else to clean your cat's litter box while you are pregnant  Changes happening with your baby: Your baby has fully formed fingernails and toenails  Your baby's heartbeat can now be heard   Ask your healthcare provider if you can listen to your baby's heartbeat  By week 14, your baby is over 4 inches long from the top of the head to the rump (baby's bottom)  Your baby weighs over 3 ounces  Prenatal care:  Prenatal care is a series of visits with your healthcare provider throughout your pregnancy  During the first 28 weeks of your pregnancy, you will see your healthcare provider 1 time each month  Prenatal care can help prevent problems during pregnancy and childbirth  Your healthcare provider will check your blood pressure and weight  Your baby's heart rate will also be checked  You may also need the following at some visits:  A pelvic exam  allows your healthcare provider to see your cervix (the bottom part of your uterus)  Your healthcare provider will use a speculum to open your vagina  He or she will check the size and shape of your uterus  Blood tests  may be done to check for any of the following:    Gestational diabetes or anemia (low iron level)    Blood type or Rh factor, or certain birth defects    Immunity to certain diseases, such as chickenpox or rubella    An infection, such as a sexually transmitted infection, HIV, or hepatitis B    Hepatitis B  may need to be prevented or treated  Hepatitis B is inflammation of the liver caused by the hepatitis B virus (HBV)  HBV can spread from a mother to her baby during delivery  You will be checked for HBV as early as possible in the first trimester of each pregnancy  You need the test even if you received the hepatitis B vaccine or were tested before  You may need to have an HBV infection treated before you give birth  Urine tests  may also be done to check for sugar and protein  These can be signs of gestational diabetes or preeclampsia  Urine tests may also be done to check for signs of infection  A fetal ultrasound  shows pictures of your baby inside your uterus   The pictures are used to check your baby's development, movement, and position  Genetic disorder screening tests  may be offered to you  These tests check your baby's risk for genetic disorders such as Down syndrome  A screening test includes a blood test and ultrasound  Follow up with your doctor or obstetrician as directed:  Go to all prenatal visits  Write down your questions so you remember to ask them during your visits  © Copyright Vianey Lambert 2022 Information is for End User's use only and may not be sold, redistributed or otherwise used for commercial purposes  The above information is an  only  It is not intended as medical advice for individual conditions or treatments  Talk to your doctor, nurse or pharmacist before following any medical regimen to see if it is safe and effective for you

## 2023-05-14 ENCOUNTER — LAB (OUTPATIENT)
Dept: LAB | Facility: HOSPITAL | Age: 28
End: 2023-05-14
Attending: OBSTETRICS & GYNECOLOGY

## 2023-05-14 ENCOUNTER — OFFICE VISIT (OUTPATIENT)
Dept: URGENT CARE | Facility: MEDICAL CENTER | Age: 28
End: 2023-05-14

## 2023-05-14 ENCOUNTER — DOCUMENTATION (OUTPATIENT)
Dept: LABOR AND DELIVERY | Facility: HOSPITAL | Age: 28
End: 2023-05-14

## 2023-05-14 VITALS
HEART RATE: 66 BPM | BODY MASS INDEX: 26 KG/M2 | WEIGHT: 166 LBS | TEMPERATURE: 98.6 F | OXYGEN SATURATION: 99 % | RESPIRATION RATE: 20 BRPM | DIASTOLIC BLOOD PRESSURE: 77 MMHG | SYSTOLIC BLOOD PRESSURE: 118 MMHG

## 2023-05-14 DIAGNOSIS — R21 RASH: ICD-10-CM

## 2023-05-14 DIAGNOSIS — R21 RASH: Primary | ICD-10-CM

## 2023-05-14 RX ORDER — ACYCLOVIR 800 MG/1
800 TABLET ORAL
Qty: 35 TABLET | Refills: 0 | Status: SHIPPED | OUTPATIENT
Start: 2023-05-14 | End: 2023-05-21

## 2023-05-14 NOTE — PATIENT INSTRUCTIONS
Take acyclovir as prescribed  Keep affected area covered to avoid transmission   Take tylenol and use lidocaine patches over the counter  Cool compresses over area  Talk to your PCP about varicella zoster vaccine  Update OB/GYN with results  Follow up with PCP in 3-5 days  Proceed to  ER if symptoms worsen

## 2023-05-14 NOTE — PROGRESS NOTES
[facility]        NAME: Charla Crawford is a 32 y o  female  : 1995    MRN: [id number]  DATE: May 14, 2023  TIME: 10:36 AM    Assessment and Plan   Rash [R21]  1  Rash  Varicella Zoster Virus DNA,PCR    acyclovir (ZOVIRAX) 800 mg tablet            Patient Instructions     Take acyclovir as prescribed  Keep affected area covered to avoid transmission   Take tylenol and use lidocaine patches over the counter  Cool compresses over area  Talk to your PCP about varicella zoster vaccine  Update OB/GYN with results  Follow up with PCP in 3-5 days  Proceed to  ER if symptoms worsen  Chief Complaint     Chief Complaint   Patient presents with   • Rash     Possible shingles started Tuesday  Using steroid cream            History of Present Illness       Patient is 12 weeks pregnant  OB/GYN recommends Varicella Zoster PCR  Rash  This is a new problem  Episode onset: Tuesday  The problem has been gradually worsening since onset  Location: R hip  The rash is characterized by redness, pain and itchiness  It is unknown if there was an exposure to a precipitant  Pertinent negatives include no fever  Past treatments include topical steroids  Her past medical history is significant for varicella  Review of Systems   Review of Systems   Constitutional: Negative for chills and fever  Skin: Positive for rash           Current Medications       Current Outpatient Medications:   •  acyclovir (ZOVIRAX) 800 mg tablet, Take 1 tablet (800 mg total) by mouth 5 (five) times a day for 7 days, Disp: 35 tablet, Rfl: 0  •  cholecalciferol (VITAMIN D3) 25 mcg (1,000 units) tablet, , Disp: , Rfl:   •  Prenatal Vit-Fe Fumarate-FA (PRENATAL PO), Take by mouth, Disp: , Rfl:   •  Probiotic Product (PROBIOTIC DAILY PO), Take by mouth Gummy probiotic, Disp: , Rfl:   •  triamcinolone (KENALOG) 0 1 % cream, Apply topically 2 (two) times a day, Disp: 30 g, Rfl: 0  •  EPINEPHrine (EPIPEN) 0 3 mg/0 3 mL SOAJ, Inject 0 3 mL (0 3 mg total) into a muscle once for 1 dose, Disp: 0 6 mL, Rfl: 2    Current Allergies     Allergies as of 05/14/2023 - Reviewed 05/14/2023   Allergen Reaction Noted   • Levofloxacin Hives 11/23/2016            The following portions of the patient's history were reviewed and updated as appropriate: allergies, current medications, past family history, past medical history, past social history, past surgical history and problem list      Past Medical History:   Diagnosis Date   • Anemia     iron deficiency   • Anxiety     grief reaction   • Asthma     exercise induced, inhalers not used, has effectively resolved with more exercise   • GERD (gastroesophageal reflux disease)    • Liver mass     focal nodular hyperplasia; 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 • Heart disease Father    • Prostate cancer Father    • No Known Problems Sister    • No Known Problems Maternal Grandmother    • Lupus Paternal Grandfather    • Ovarian cancer Other          Medications have been verified  Objective   /77   Pulse 66   Temp 98 6 °F (37 °C)   Resp 20   Wt 75 3 kg (166 lb)   LMP 02/14/2023 (Exact Date)   SpO2 99%   BMI 26 00 kg/m²   Patient's last menstrual period was 02/14/2023 (exact date)  Physical Exam     Physical Exam  Constitutional:       General: She is not in acute distress  Appearance: She is well-developed     Cardiovascular:      Rate and Rhythm: Normal rate and regular rhythm  Heart sounds: Normal heart sounds  No murmur heard  No friction rub  No gallop  Pulmonary:      Effort: Pulmonary effort is normal  No respiratory distress  Breath sounds: Normal breath sounds  No wheezing, rhonchi or rales  Skin:     General: Skin is warm  Findings: Rash present  Comments: R hip pictured below     Neurological:      Mental Status: She is alert  Psychiatric:         Behavior: Behavior normal          Thought Content:  Thought content normal          Judgment: Judgment normal

## 2023-05-14 NOTE — LETTER
May 14, 2023     Patient: Saumya Mathews   YOB: 1995   Date of Visit: 5/14/2023       To Whom It May Concern: It is my medical opinion that Saumya Mathews may return once symptoms have improved  If you have any questions or concerns, please don't hesitate to call           Sincerely,        Marilyn Gunderson PA-C    CC: No Recipients

## 2023-05-15 ENCOUNTER — ROUTINE PRENATAL (OUTPATIENT)
Dept: PERINATAL CARE | Facility: OTHER | Age: 28
End: 2023-05-15

## 2023-05-15 ENCOUNTER — ROUTINE PRENATAL (OUTPATIENT)
Dept: OBGYN CLINIC | Facility: CLINIC | Age: 28
End: 2023-05-15

## 2023-05-15 VITALS
HEART RATE: 82 BPM | SYSTOLIC BLOOD PRESSURE: 118 MMHG | DIASTOLIC BLOOD PRESSURE: 72 MMHG | BODY MASS INDEX: 25.9 KG/M2 | WEIGHT: 165 LBS | HEIGHT: 67 IN

## 2023-05-15 VITALS
BODY MASS INDEX: 25.9 KG/M2 | SYSTOLIC BLOOD PRESSURE: 110 MMHG | HEART RATE: 87 BPM | HEIGHT: 67 IN | DIASTOLIC BLOOD PRESSURE: 78 MMHG | WEIGHT: 165 LBS

## 2023-05-15 DIAGNOSIS — F41.1 GAD (GENERALIZED ANXIETY DISORDER): ICD-10-CM

## 2023-05-15 DIAGNOSIS — Z36.89 CONFIRM FETAL CARDIAC ACTIVITY USING ULTRASOUND: ICD-10-CM

## 2023-05-15 DIAGNOSIS — Z34.91 FIRST TRIMESTER PREGNANCY: Primary | ICD-10-CM

## 2023-05-15 DIAGNOSIS — Z36.1 NEED FOR MATERNAL SERUM ALPHA-PROTEIN (MSAFP) SCREENING: ICD-10-CM

## 2023-05-15 DIAGNOSIS — Z34.00 SUPERVISION OF NORMAL FIRST PREGNANCY, ANTEPARTUM: ICD-10-CM

## 2023-05-15 DIAGNOSIS — Z3A.12 12 WEEKS GESTATION OF PREGNANCY: ICD-10-CM

## 2023-05-15 DIAGNOSIS — Z82.49 FAMILY HISTORY OF THROMBOEMBOLIC DISEASE: ICD-10-CM

## 2023-05-15 DIAGNOSIS — Z36.82 ENCOUNTER FOR (NT) NUCHAL TRANSLUCENCY SCAN: Primary | ICD-10-CM

## 2023-05-15 LAB
SL AMB  POCT GLUCOSE, UA: NEGATIVE
SL AMB POCT URINE PROTEIN: NEGATIVE

## 2023-05-15 NOTE — LETTER
"May 15, 2023     MD [address] [facility]    Patient: Raphael Bhat   YOB: 1995   Date of Visit: 5/15/2023       Dear Dr Stef Georges: Thank you for referring Raphael Bhat to me for evaluation  Below are my notes for this consultation  If you have questions, please do not hesitate to call me  I look forward to following your patient along with you  Sincerely,        Des Gunn MD        CC: No Recipients  Des Gunn MD  5/15/2023  4:29 PM  Sign when Signing Visit  [address]: Ms Judy Robison was seen today for nuchal translucency ultrasound  See ultrasound report under \"OB Procedures\" tab  Physical Exam  Constitutional:       General: She is not in acute distress  Appearance: Normal appearance  HENT:      Head: Normocephalic and atraumatic  Eyes:      Extraocular Movements: Extraocular movements intact  Cardiovascular:      Rate and Rhythm: Normal rate  Pulmonary:      Effort: Pulmonary effort is normal  No respiratory distress  Skin:     Findings: No erythema or rash  Neurological:      Mental Status: She is alert and oriented to person, place, and time  Psychiatric:         Mood and Affect: Mood normal          Behavior: Behavior normal          Please don't hesitate to contact our office with any concerns or questions    -Des Gunn MD        "

## 2023-05-16 LAB
CMV IGG SERPL IA-ACNC: <0.6 U/ML (ref 0–0.59)
CMV IGM SERPL IA-ACNC: <30 AU/ML (ref 0–29.9)

## 2023-05-19 ENCOUNTER — TELEPHONE (OUTPATIENT)
Facility: HOSPITAL | Age: 28
End: 2023-05-19

## 2023-05-19 LAB — VZV DNA SPEC QL NAA+PROBE: POSITIVE

## 2023-05-19 NOTE — TELEPHONE ENCOUNTER
Johnaralina Goyo and I spoke on the phone following the return of her VZV PCR, which resulted positive  She reports she feels well and denies pain in the area of her rash  She does report itching  No systemic symptoms  She continues to take acyclovir  We reviewed that shingles in pregnancy is uncommon and therefore there are no large studies to guide management  However, the available data is reassuring  It does not appear to cross the placenta and does not appear to be associated for increased risk for congenital anomalies, pregnancy loss, fetal growth restriction, or  delivery  It is not associated with congenital varicella syndrome  Out of an abundance of caution and due to rarity of shingles in pregnancy, Alejandra Nance will be scheduled for an early anatomic survey in addition to her routine level 2 ultrasound  We will also plan for third trimester growth assessment      Bharath Nixon MD  Maternal-Fetal Medicine

## 2023-06-07 NOTE — ASSESSMENT & PLAN NOTE
First degree affected relative so offered thrombophilia screening 
Her father's cardiac history preceded his other health problems and was at a young age  She is young and healthy with low likelihood of cardiac problems however would be worthwhile at some point to meet with cardiologist to discuss long term cardiac health and see if any workup needs to be done for her father's history of cardiomyopathy  Referral offered and provided 
· Not yet successful trying to conceive for the past 4 months but has been off contraception for years and cycles are irregular  · Has referral to [facility] and will go in nonurgent fashion  · Taking prenatal vitamins  · Exercises regularly and worked hard to bring BMI down from 28 to 25  · Offered carrier screening which she accepted  · She is well aware of schedule of routine OB care, aneuploidy screening and ultrasounds 
4 = No assist / stand by assistance

## 2023-06-08 ENCOUNTER — APPOINTMENT (OUTPATIENT)
Dept: LAB | Facility: CLINIC | Age: 28
End: 2023-06-08
Payer: COMMERCIAL

## 2023-06-08 ENCOUNTER — ROUTINE PRENATAL (OUTPATIENT)
Dept: PERINATAL CARE | Facility: OTHER | Age: 28
End: 2023-06-08
Payer: COMMERCIAL

## 2023-06-08 VITALS
DIASTOLIC BLOOD PRESSURE: 70 MMHG | HEART RATE: 78 BPM | SYSTOLIC BLOOD PRESSURE: 128 MMHG | BODY MASS INDEX: 26.13 KG/M2 | HEIGHT: 67 IN | WEIGHT: 166.5 LBS

## 2023-06-08 DIAGNOSIS — Z36.1 NEED FOR MATERNAL SERUM ALPHA-PROTEIN (MSAFP) SCREENING: ICD-10-CM

## 2023-06-08 DIAGNOSIS — Z36.3 ENCOUNTER FOR ANTENATAL SCREENING FOR MALFORMATION: ICD-10-CM

## 2023-06-08 DIAGNOSIS — Z3A.12 12 WEEKS GESTATION OF PREGNANCY: ICD-10-CM

## 2023-06-08 DIAGNOSIS — O98.512 VARICELLA AFFECTING PREGNANCY IN SECOND TRIMESTER: ICD-10-CM

## 2023-06-08 DIAGNOSIS — Z34.91 FIRST TRIMESTER PREGNANCY: ICD-10-CM

## 2023-06-08 DIAGNOSIS — Z3A.16 16 WEEKS GESTATION OF PREGNANCY: Primary | ICD-10-CM

## 2023-06-08 DIAGNOSIS — B01.9 VARICELLA AFFECTING PREGNANCY IN SECOND TRIMESTER: ICD-10-CM

## 2023-06-08 PROCEDURE — 82105 ALPHA-FETOPROTEIN SERUM: CPT

## 2023-06-08 PROCEDURE — 76816 OB US FOLLOW-UP PER FETUS: CPT | Performed by: OBSTETRICS & GYNECOLOGY

## 2023-06-08 PROCEDURE — 99213 OFFICE O/P EST LOW 20 MIN: CPT | Performed by: OBSTETRICS & GYNECOLOGY

## 2023-06-08 PROCEDURE — 36415 COLL VENOUS BLD VENIPUNCTURE: CPT

## 2023-06-08 NOTE — PROGRESS NOTES
"[address]: Lillian Salmon was seen today for early anatomic screening  See ultrasound report under \"OB Procedures\" tab  The time spent on this established patient on the encounter date included 8 minutes previsit service time reviewing records and precharting, 5 minutes face-to-face service time counseling regarding results and coordinating care, and  5 minutes charting, totalling 18 minutes    Please don't hesitate to contact our office with any concerns or questions   -Cheryle Campos MD      "

## 2023-06-08 NOTE — PATIENT INSTRUCTIONS
Thank you for choosing us for your  care today  If you have any questions about your ultrasound or care, please do not hesitate to contact us or your primary obstetrician  Some general instructions for your pregnancy are:    Protect against coronavirus: get vaccinated - pregnant women are increased risk of severe COVID  Notify your primary care doctor if you have any symptoms  Exercise: Aim for 22 minutes per day (150 minutes per week) of regular exercise  Walking is great! Nutrition: aim for calcium-rich and iron-rich foods as well as healthy sources of protein  Learn about Preeclampsia: preeclampsia is a common, serious high blood pressure complication in pregnancy  A blood pressure of 061AGFG (systolic or top number) or 91MEZJ (diastolic or bottom number) is not normal and needs evaluation by your doctor  Aspirin is sometimes prescribed in early pregnancy to prevent preeclampsia in women with risk factors - ask your obstetrician if you should be on this medication  If you smoke, try to reduce how many cigarettes you smoke or try to quit completely  Do not vape  Other warning signs to watch out for in pregnancy or postpartum: chest pain, obstructed breathing or shortness of breath, seizures, thoughts of hurting yourself or your baby, bleeding, a painful or swollen leg, fever, or headache (see [facility] POST-BIRTH Warning Signs campaign)  If these happen call 911  Itching is also not normal in pregnancy and if you experience this, especially over your hands and feet, potentially worse at night, notify your doctors

## 2023-06-08 NOTE — LETTER
"2023    MD [address] [facility]    Patient: Rosalie Abbott   YOB: 1995   Date of Visit: 2023   Gestational age Caprice Jewel of this communication: Routine       Dear Dr Julian Cunningham,    This patient was seen recently in our  office  The content of my evaluation today is in the ultrasound report under \"OB Procedures\" tab  Please don't hesitate to contact our office with any concerns or questions       Sincerely,      Izzy Ordonez MD  Attending Physician, [facility]      "

## 2023-06-10 PROBLEM — R16.0 LIVER MASS: Status: RESOLVED | Noted: 2018-12-04 | Resolved: 2023-06-10

## 2023-06-10 PROBLEM — R53.83 FATIGUE: Status: RESOLVED | Noted: 2020-02-08 | Resolved: 2023-06-10

## 2023-06-10 PROBLEM — F43.20 GRIEF REACTION: Status: RESOLVED | Noted: 2021-11-04 | Resolved: 2023-06-10

## 2023-06-10 PROBLEM — Z3A.17 17 WEEKS GESTATION OF PREGNANCY: Status: ACTIVE | Noted: 2023-04-07

## 2023-06-10 PROBLEM — F43.21 GRIEF REACTION: Status: RESOLVED | Noted: 2021-11-04 | Resolved: 2023-06-10

## 2023-06-10 LAB
2ND TRIMESTER 4 SCREEN SERPL-IMP: NORMAL
AFP ADJ MOM SERPL: 0.66
AFP INTERP AMN-IMP: NORMAL
AFP INTERP SERPL-IMP: NORMAL
AFP INTERP SERPL-IMP: NORMAL
AFP SERPL-MCNC: 22.2 NG/ML
AGE AT DELIVERY: 28.4 YR
GA METHOD: NORMAL
GA: 16.3 WEEKS
IDDM PATIENT QL: NO
MULTIPLE PREGNANCY: NO
NEURAL TUBE DEFECT RISK FETUS: NORMAL %

## 2023-06-11 NOTE — PATIENT INSTRUCTIONS
Pregnancy at 15 to 18 Weeks   [address]:   What changes are happening in my body? Now that you are in your second trimester, you have more energy  You may also feel hungrier than usual  You may start to experience other symptoms, such as heartburn or dizziness  You may be gaining about ½ to 1 pound a week, and your pregnancy is beginning to show  You may need to start wearing maternity clothes  How do I care for myself at this stage of my pregnancy? Manage heartburn  by eating 4 or 5 small meals each day instead of large meals  Avoid spicy foods  Avoid eating right before bedtime  Manage nausea and vomiting  Avoid fatty and spicy foods  Eat small meals throughout the day instead of large meals  Kirstie may help to decrease nausea  Ask your healthcare provider about other ways of decreasing nausea and vomiting  Eat a variety of healthy foods  Healthy foods include fruits, vegetables, whole-grain breads, low-fat dairy foods, beans, lean meats, and fish  Drink liquids as directed  Ask how much liquid to drink each day and which liquids are best for you  Limit caffeine to less than 200 milligrams each day  Limit your intake of fish to 2 servings each week  Choose fish low in mercury such as canned light tuna, shrimp, salmon, cod, or tilapia  Do not  eat fish high in mercury such as swordfish, tilefish, man mackerel, and shark  Take prenatal vitamins as directed  Your need for certain vitamins and minerals, such as folic acid, increases during pregnancy  Prenatal vitamins provide some of the extra vitamins and minerals you need  Prenatal vitamins may also help to decrease the risk of certain birth defects  Do not smoke  Smoking increases your risk of a miscarriage and other health problems during your pregnancy  Smoking can cause your baby to be born too early or weigh less at birth  Ask your healthcare provider for information if you need help quitting      Do not drink alcohol  Alcohol passes from your body to your baby through the placenta  It can affect your baby's brain development and cause fetal alcohol syndrome (FAS)  FAS is a group of conditions that causes mental, behavior, and growth problems  Talk to your healthcare provider before you take any medicines  Many medicines may harm your baby if you take them when you are pregnant  Do not take any medicines, vitamins, herbs, or supplements without first talking to your healthcare provider  Never use illegal or street drugs (such as marijuana or cocaine) while you are pregnant  What are some safety tips during pregnancy? Avoid hot tubs and saunas  Do not use a hot tub or sauna while you are pregnant, especially during your first trimester  Hot tubs and saunas may raise your baby's temperature and increase the risk of birth defects  Avoid toxoplasmosis  This is an infection caused by eating raw meat or being around infected cat feces  It can cause birth defects, miscarriages, and other problems  Wash your hands after you touch raw meat  Make sure any meat is well-cooked before you eat it  Avoid raw eggs and unpasteurized milk  Use gloves or ask someone else to clean your cat's litter box while you are pregnant  What changes are happening with my baby? By 18 weeks, your baby may be about 6 inches long from the top of the head to the rump (baby's bottom)  Your baby may weigh about 11 ounces  You may be able to feel your baby's movement at about 18 weeks or later  The first movements may not be that noticeable  They may feel like a fluttering sensation  Your baby also makes sucking movements and can hear certain sounds  What do I need to know about prenatal care? During the first 28 weeks of your pregnancy, you will see your healthcare provider once a month  Your healthcare provider will check your blood pressure and weight   You may also need any of the following:  A urine test  may also be done to check for sugar and protein  These can be signs of gestational diabetes or infection  A blood test  may be done to check for anemia (low iron level)  Fundal height check  is a measurement of your uterus to check your baby's growth  This number is usually the same as the number of weeks that you have been pregnant  An ultrasound  may be done to check your baby's development  Your healthcare provider may be able to tell you what your baby's gender is during the ultrasound  Your baby's heart rate  will be checked  When should I seek immediate care? You have pain or cramping in your abdomen or low back  You have heavy vaginal bleeding or clotting  You pass material that looks like tissue or large clots  Collect the material and bring it with you  When should I call my doctor or obstetrician? You cannot keep food or drinks down, and you are losing weight  You have light bleeding  You have chills or a fever  You have vaginal itching, burning, or pain  You have yellow, green, white, or foul-smelling vaginal discharge  You have pain or burning when you urinate, less urine than usual, or pink or bloody urine  You have questions or concerns about your condition or care  CARE AGREEMENT:   You have the right to help plan your care  Learn about your health condition and how it may be treated  Discuss treatment options with your healthcare providers to decide what care you want to receive  You always have the right to refuse treatment  The above information is an  only  It is not intended as medical advice for individual conditions or treatments  Talk to your doctor, nurse or pharmacist before following any medical regimen to see if it is safe and effective for you  © Copyright Antionette Beal 2022 Information is for End User's use only and may not be sold, redistributed or otherwise used for commercial purposes

## 2023-06-11 NOTE — PROGRESS NOTES
OB/GYN  PN Visit  Nico Lai  [id number]  2023  2:40 PM  Dr Norm Ansari MD    S: 32 y o  Reagan Snyders 17 0/7 d here for PN visit  Chief Complaint   Patient presents with   • Routine Prenatal Visit     Pt reports no concerns         OB complaints:  Contractions: no  Leakage: no  Bleeding: no  Fetal movement:  No     O:  /70 (BP Location: Right arm, Cuff Size: Standard)   Pulse 100   Temp 98 6 °F (37 °C) (Tympanic)   Wt 75 5 kg (166 lb 6 4 oz)   LMP 2023 (Exact Date)   BMI 26 06 kg/m²       Review of Systems   Constitutional: Negative  HENT: Negative  Eyes: Negative  Respiratory: Negative  Cardiovascular: Negative  Gastrointestinal: Negative  Endocrine: Negative  Genitourinary:        As noted in HPI   Musculoskeletal: Negative  Skin: Negative  Allergic/Immunologic: Negative  Neurological: Negative  Hematological: Negative  Psychiatric/Behavioral: Negative  Physical Exam  Constitutional:       General: She is not in acute distress  Appearance: She is well-developed  Abdominal:      Palpations: Abdomen is soft  Tenderness: There is no abdominal tenderness  There is no guarding  Neurological:      Mental Status: She is alert and oriented to person, place, and time  Skin:     General: Skin is warm and dry     Psychiatric:         Behavior: Behavior normal              Pregravid Weight/BMI: 73 5 kg (162 lb) (BMI 25 37)  Current Weight: 75 5 kg (166 lb 6 4 oz)   Total Weight Gain: 1 996 kg (4 lb 6 4 oz)   Pre- Vitals    Flowsheet Row Most Recent Value   Prenatal Assessment    Fetal Heart Rate 150   Movement Absent   Prenatal Vitals    Blood Pressure 112/70   Weight - Scale 75 5 kg (166 lb 6 4 oz)   Urine Albumin/Glucose    Dilation/Effacement/Station    Vaginal Drainage    Edema            Problem List        Digestive    Focal nodular hyperplasia of liver       Other    Seasonal allergies    SALLIE (generalized anxiety disorder) Family history of thromboembolic disease    Overview     Thrombophilia labs: negative         Family history of cardiovascular disease    Supervision of normal first pregnancy, antepartum    Overview       CF/ SMA testing: negative  Flu vaccine:  completed  Covid vaccine:  completed X 2           17 weeks gestation of pregnancy   Other Visit Diagnoses     Second trimester pregnancy    -  Primary         Discussed appropriate weight gaoin  Level 2 US scheduled  Early anatomy normal  AFP normal  C/o Allergies: Chen Ponds  May use Sudafed prn      Future Appointments   Date Time Provider Elizabet Winn   2023  2:30 PM   [address] [address]   2023  4:30 PM  [address]   2023  9:30 AM  [place]   2023  1:00 PM Davina Acosta PA-C [facility] Illona Kawasaki, MD  2023  2:40 PM

## 2023-06-12 ENCOUNTER — ROUTINE PRENATAL (OUTPATIENT)
Dept: OBGYN CLINIC | Facility: CLINIC | Age: 28
End: 2023-06-12
Payer: COMMERCIAL

## 2023-06-12 VITALS
BODY MASS INDEX: 26.06 KG/M2 | DIASTOLIC BLOOD PRESSURE: 70 MMHG | HEART RATE: 100 BPM | WEIGHT: 166.4 LBS | SYSTOLIC BLOOD PRESSURE: 112 MMHG | TEMPERATURE: 98.6 F

## 2023-06-12 DIAGNOSIS — Z3A.17 17 WEEKS GESTATION OF PREGNANCY: ICD-10-CM

## 2023-06-12 DIAGNOSIS — Z82.49 FAMILY HISTORY OF THROMBOEMBOLIC DISEASE: ICD-10-CM

## 2023-06-12 DIAGNOSIS — Z34.92 SECOND TRIMESTER PREGNANCY: Primary | ICD-10-CM

## 2023-06-12 DIAGNOSIS — Z34.00 SUPERVISION OF NORMAL FIRST PREGNANCY, ANTEPARTUM: ICD-10-CM

## 2023-06-12 LAB
SL AMB  POCT GLUCOSE, UA: NEGATIVE
SL AMB POCT URINE PROTEIN: NEGATIVE

## 2023-06-12 PROCEDURE — PNV: Performed by: OBSTETRICS & GYNECOLOGY

## 2023-06-12 PROCEDURE — 81002 URINALYSIS NONAUTO W/O SCOPE: CPT | Performed by: OBSTETRICS & GYNECOLOGY

## 2023-07-02 PROBLEM — Z3A.19 19 WEEKS GESTATION OF PREGNANCY: Status: ACTIVE | Noted: 2023-04-07

## 2023-07-02 NOTE — PROGRESS NOTES
OB/GYN  PN Visit  Jose Fee  [id number]  7/3/2023  9:00 AM  Dr. Theresa Garcia MD    S: 29 y.o. Exie Holloway 19w6d here for PN visit. Chief Complaint   Patient presents with   • Routine Prenatal Visit     Pt reports no concerns         OB complaints:  Contractions: no  Leakage: no  Bleeding: no  Fetal movement: yes      O:  /62 (BP Location: Right arm, Cuff Size: Standard)   Pulse 80   Temp 98.2 °F (36.8 °C) (Tympanic)   Wt 77.1 kg (170 lb)   LMP 2023 (Exact Date)   BMI 26.63 kg/m²       Review of Systems   Constitutional: Negative. HENT: Negative. Eyes: Negative. Respiratory: Negative. Cardiovascular: Negative. Gastrointestinal: Negative. Endocrine: Negative. Genitourinary:        As noted in HPI   Musculoskeletal: Negative. Skin: Negative. Allergic/Immunologic: Negative. Neurological: Negative. Hematological: Negative. Psychiatric/Behavioral: Negative. Physical Exam  Constitutional:       General: She is not in acute distress. Appearance: She is well-developed. Abdominal:      Palpations: Abdomen is soft. Tenderness: There is no abdominal tenderness. There is no guarding. Neurological:      Mental Status: She is alert and oriented to person, place, and time. Skin:     General: Skin is warm and dry.    Psychiatric:         Behavior: Behavior normal.             Pregravid Weight/BMI: 73.5 kg (162 lb) (BMI 25.37)  Current Weight: 77.1 kg (170 lb)   Total Weight Gain: 3.629 kg (8 lb)   Pre- Vitals    Flowsheet Row Most Recent Value   Prenatal Assessment    Fetal Heart Rate 150   Movement Present   Prenatal Vitals    Blood Pressure 100/62   Weight - Scale 77.1 kg (170 lb)   Urine Albumin/Glucose    Dilation/Effacement/Station    Vaginal Drainage    Edema            Problem List        Digestive    Focal nodular hyperplasia of liver       Other    Seasonal allergies    SALLIE (generalized anxiety disorder)    Family history of thromboembolic disease    Overview     Thrombophilia labs: negative         Family history of cardiovascular disease    Supervision of normal first pregnancy, antepartum    Overview       CF/ SMA testing: negative  Flu vaccine:  completed  Covid vaccine:  completed X 2           19 weeks gestation of pregnancy   Other Visit Diagnoses     Second trimester pregnancy    -  Primary         Level 2 US scheduled  Advised Colace for constipation  Follow-up in 4 weeks      Future Appointments   Date Time Provider [address]   2023  2:30 PM   [address]   2023  4:30 PM  [facility]   2023  9:30 AM   [place]   2023  1:00 PM [facility]  7/3/2023  9:00 AM

## 2023-07-02 NOTE — PATIENT INSTRUCTIONS
Pregnancy at 23 to 22 [place]:   Now that you are in your second trimester, you have more energy. You may also be feeling hungrier than usual. You may be gaining about ½ to 1 pound a week, and your pregnancy is beginning to show. You may need to start wearing maternity clothes. As your baby gets larger, you may have other symptoms. These may include body aches or stretch marks on your abdomen, breasts, thighs, or buttocks. DISCHARGE INSTRUCTIONS:   Return to the emergency department if:   You develop a severe headache that does not go away. You have new or increased vision changes, such as blurred or spotted vision. You have new or increased swelling in your face or hands. You have vaginal spotting or bleeding. Your water broke or you feel warm water gushing or trickling from your vagina. Call your doctor or obstetrician if:   You have abdominal cramps, pressure, or tightening. You have a change in vaginal discharge. You cannot keep food or drinks down, and you are losing weight. You have chills or a fever. You have vaginal itching, burning, or pain. You have yellow, green, white, or foul-smelling vaginal discharge. You have pain or burning when you urinate, less urine than usual, or pink or bloody urine. You have questions or concerns about your condition or care. How to care for yourself at this stage of your pregnancy:       Eat a variety of healthy foods. Healthy foods include fruits, vegetables, whole-grain breads, low-fat dairy foods, beans, lean meats, and fish. Drink liquids as directed. Ask how much liquid to drink each day and which liquids are best for you. Limit caffeine to less than 200 milligrams each day. Limit your intake of fish to 2 servings each week. Choose fish low in mercury such as canned light tuna, shrimp, salmon, cod, or tilapia. Do not  eat fish high in mercury such as swordfish, tilefish, man mackerel, and shark.          Take prenatal vitamins as directed. Your need for certain vitamins and minerals, such as folic acid, increases during pregnancy. Prenatal vitamins provide some of the extra vitamins and minerals you need. Prenatal vitamins may also help to decrease the risk of certain birth defects. Talk to your healthcare provider about exercise. Moderate exercise can help you stay fit. Your healthcare provider will help you plan an exercise program that is safe for you during pregnancy. Do not smoke. Smoking increases your risk of a miscarriage and other health problems during your pregnancy. Smoking can cause your baby to be born too early or weigh less at birth. Ask your healthcare provider for information if you need help quitting. Do not drink alcohol. Alcohol passes from your body to your baby through the placenta. It can affect your baby's brain development and cause fetal alcohol syndrome (FAS). FAS is a group of conditions that causes mental, behavior, and growth problems. Talk to your healthcare provider before you take any medicines. Many medicines may harm your baby if you take them when you are pregnant. Do not take any medicines, vitamins, herbs, or supplements without first talking to your healthcare provider. Never use illegal or street drugs (such as marijuana or cocaine) while you are pregnant. Safety tips during pregnancy:   Avoid hot tubs and saunas. Do not use a hot tub or sauna while you are pregnant, especially during your first trimester. Hot tubs and saunas may raise your baby's temperature and increase the risk of birth defects. Avoid toxoplasmosis. This is an infection caused by eating raw meat or being around infected cat feces. It can cause birth defects, miscarriages, and other problems. Wash your hands after you touch raw meat. Make sure any meat is well-cooked before you eat it. Avoid raw eggs and unpasteurized milk.  Use gloves or ask someone else to clean your cat's litter box while you are pregnant. Changes happening with your baby:  By 22 weeks, your baby is about 8 inches long from the top of the head to the rump (baby's bottom). Your baby also weighs about 1 pound. Your baby is becoming much more active. You may be able to feel the baby move inside you now. The first movements may not be that noticeable. They may feel like a fluttering sensation. As time goes on, your baby's movements will become stronger and more noticeable. What you need to know about prenatal care:  During the first 28 weeks of your pregnancy, you will see your healthcare provider once a month. Your healthcare provider will check your blood pressure and weight. You may also need the following:  A urine test  may also be done to check for sugar and protein. These can be signs of gestational diabetes or infection. Protein in your urine may also be a sign of preeclampsia. Preeclampsia is a condition that can develop during week 20 or later of your pregnancy. It causes high blood pressure, and it can cause problems with your kidneys and other organs. Fundal height  is a measurement of your uterus to check your baby's growth. This number is usually the same as the number of weeks that you have been pregnant. A fetal ultrasound  shows pictures of your baby inside your uterus. It shows your baby's development. The movement and position of your baby can also be seen. Your healthcare provider may be able to tell you what your baby's gender is during the ultrasound. Your baby's heart rate  will be checked. Follow up with your obstetrician as directed:  Write down your questions so you remember to ask them during your visits. © Copyright [employer] 2022 Information is for End User's use only and may not be sold, redistributed or otherwise used for commercial purposes. The above information is an  only.  It is not intended as medical advice for individual conditions or treatments. Talk to your doctor, nurse or pharmacist before following any medical regimen to see if it is safe and effective for you.

## 2023-07-03 ENCOUNTER — ROUTINE PRENATAL (OUTPATIENT)
Dept: OBGYN CLINIC | Facility: CLINIC | Age: 28
End: 2023-07-03
Payer: COMMERCIAL

## 2023-07-03 VITALS
DIASTOLIC BLOOD PRESSURE: 62 MMHG | TEMPERATURE: 98.2 F | BODY MASS INDEX: 26.63 KG/M2 | SYSTOLIC BLOOD PRESSURE: 100 MMHG | WEIGHT: 170 LBS | HEART RATE: 80 BPM

## 2023-07-03 DIAGNOSIS — Z34.92 SECOND TRIMESTER PREGNANCY: Primary | ICD-10-CM

## 2023-07-03 DIAGNOSIS — Z3A.19 19 WEEKS GESTATION OF PREGNANCY: ICD-10-CM

## 2023-07-03 DIAGNOSIS — F41.1 GAD (GENERALIZED ANXIETY DISORDER): ICD-10-CM

## 2023-07-03 DIAGNOSIS — Z34.00 SUPERVISION OF NORMAL FIRST PREGNANCY, ANTEPARTUM: ICD-10-CM

## 2023-07-03 LAB
SL AMB  POCT GLUCOSE, UA: NEGATIVE
SL AMB POCT URINE PROTEIN: NEGATIVE

## 2023-07-03 PROCEDURE — PNV: Performed by: OBSTETRICS & GYNECOLOGY

## 2023-07-03 PROCEDURE — 81002 URINALYSIS NONAUTO W/O SCOPE: CPT | Performed by: OBSTETRICS & GYNECOLOGY

## 2023-07-05 NOTE — PROGRESS NOTES
Please refer to the [facility] ultrasound report in Ob Procedures for additional information regarding today's visit

## 2023-07-06 ENCOUNTER — ROUTINE PRENATAL (OUTPATIENT)
Dept: PERINATAL CARE | Facility: OTHER | Age: 28
End: 2023-07-06
Payer: COMMERCIAL

## 2023-07-06 ENCOUNTER — APPOINTMENT (OUTPATIENT)
Dept: PERINATAL CARE | Facility: OTHER | Age: 28
End: 2023-07-06
Payer: COMMERCIAL

## 2023-07-06 VITALS
WEIGHT: 171.8 LBS | DIASTOLIC BLOOD PRESSURE: 78 MMHG | SYSTOLIC BLOOD PRESSURE: 122 MMHG | BODY MASS INDEX: 26.97 KG/M2 | HEIGHT: 67 IN | HEART RATE: 82 BPM

## 2023-07-06 DIAGNOSIS — O98.512: ICD-10-CM

## 2023-07-06 DIAGNOSIS — Z86.19 HISTORY OF SHINGLES: ICD-10-CM

## 2023-07-06 DIAGNOSIS — Z3A.20 20 WEEKS GESTATION OF PREGNANCY: Primary | ICD-10-CM

## 2023-07-06 DIAGNOSIS — Z36.86 ENCOUNTER FOR ANTENATAL SCREENING FOR CERVICAL LENGTH: ICD-10-CM

## 2023-07-06 DIAGNOSIS — Z36.3 ENCOUNTER FOR ANTENATAL SCREENING FOR MALFORMATIONS: ICD-10-CM

## 2023-07-06 PROCEDURE — 76805 OB US >/= 14 WKS SNGL FETUS: CPT | Performed by: OBSTETRICS & GYNECOLOGY

## 2023-07-06 PROCEDURE — 76817 TRANSVAGINAL US OBSTETRIC: CPT | Performed by: OBSTETRICS & GYNECOLOGY

## 2023-07-06 PROCEDURE — 99213 OFFICE O/P EST LOW 20 MIN: CPT | Performed by: OBSTETRICS & GYNECOLOGY

## 2023-07-06 NOTE — PROGRESS NOTES
Ultrasound Probe Disinfection    A transvaginal ultrasound was performed. Prior to use, disinfection was performed with High Level Disinfection Process (Fibroblaston). Probe serial number [id number]: [id number]  was used.       [email or web address]  07/06/23  2:24 PM

## 2023-07-06 NOTE — LETTER
July 6, 2023     Nuno Steinberg, [address]   [address]    Patient: Nicolas Agustin   YOB: 1995   Date of Visit: 7/6/2023       Dear Dr. Chioma Way: Thank you for referring Nicolas Agustin to me for evaluation. Below are my notes for this consultation. If you have questions, please do not hesitate to call me. I look forward to following your patient along with you.          Sincerely,        Jessica Bonds MD        CC: No Recipients    Jessica Bonds MD  7/5/2023  7:49 PM  Sign when Signing Visit  Please refer to the [facility] ultrasound report in Ob Procedures for additional information regarding today's visit

## 2023-07-24 ENCOUNTER — TELEPHONE (OUTPATIENT)
Dept: PERINATAL CARE | Facility: OTHER | Age: 28
End: 2023-07-24

## 2023-07-24 NOTE — TELEPHONE ENCOUNTER
Left patient a message that her MFM appointment had to be rescheduled to 9/26/23 at 12:45 PM.  The new time, date and location were provided. The patient has been instructed to please call us back at [phone] with any questions or concerns.

## 2023-07-29 PROBLEM — Z3A.23 23 WEEKS GESTATION OF PREGNANCY: Status: ACTIVE | Noted: 2023-04-07

## 2023-07-29 NOTE — PATIENT INSTRUCTIONS
Pregnancy at 23 to 26 [place]:   You are now close to or at the beginning of the third trimester. The third trimester starts at 24 weeks and ends with delivery. As your baby gets larger, you may develop certain symptoms. These may include pain in your back or down the sides of your abdomen. You may also have stretch marks on your abdomen, breasts, thighs, or buttocks. You may also have constipation. DISCHARGE INSTRUCTIONS:   Return to the emergency department if:   You develop a severe headache that does not go away. You have new or increased vision changes, such as blurred or spotted vision. You have new or increased swelling in your face or hands. You have vaginal spotting or bleeding. Your water broke or you feel warm water gushing or trickling from your vagina. Call your doctor or obstetrician if:   You have abdominal cramps, pressure, or tightening. You have a change in vaginal discharge. You have light bleeding. You have chills or a fever. You have vaginal itching, burning, or pain. You have yellow, green, white, or foul-smelling vaginal discharge. You have pain or burning when you urinate, less urine than usual, or pink or bloody urine. You have questions or concerns about your condition or care. How to care for yourself at this stage of your pregnancy:       Eat a variety of healthy foods. Healthy foods include fruits, vegetables, whole-grain breads, low-fat dairy foods, beans, lean meats, and fish. Drink liquids as directed. Ask how much liquid to drink each day and which liquids are best for you. Limit caffeine to less than 200 milligrams each day. Limit your intake of fish to 2 servings each week. Choose fish low in mercury such as canned light tuna, shrimp, salmon, cod, or tilapia. Do not  eat fish high in mercury such as swordfish, tilefish, man mackerel, and shark. Manage back pain.   Do not stand for long periods of time or lift heavy items. Use good posture while you stand, squat, or bend. Wear low-heeled shoes with good support. Rest may also help to relieve back pain. Ask your healthcare provider about exercises you can do to strengthen your back muscles. Take prenatal vitamins as directed. Your need for certain vitamins and minerals, such as folic acid, increases during pregnancy. Prenatal vitamins provide some of the extra vitamins and minerals you need. Prenatal vitamins may also help to decrease the risk of certain birth defects. Talk to your healthcare provider about exercise. Moderate exercise can help you stay fit. Your healthcare provider will help you plan an exercise program that is safe for you during pregnancy. Do not smoke. Smoking increases your risk of a miscarriage and other health problems during your pregnancy. Smoking can cause your baby to be born too early or weigh less at birth. Ask your healthcare provider for information if you need help quitting. Do not drink alcohol. Alcohol passes from your body to your baby through the placenta. It can affect your baby's brain development and cause fetal alcohol syndrome (FAS). FAS is a group of conditions that causes mental, behavior, and growth problems. Talk to your healthcare provider before you take any medicines. Many medicines may harm your baby if you take them when you are pregnant. Do not take any medicines, vitamins, herbs, or supplements without first talking to your healthcare provider. Never use illegal or street drugs (such as marijuana or cocaine) while you are pregnant. Safety tips:   Avoid hot tubs and saunas. Do not use a hot tub or sauna while you are pregnant, especially during your first trimester. Hot tubs and saunas may raise your baby's temperature and increase the risk of birth defects. Avoid toxoplasmosis. This is an infection caused by eating raw meat or being around infected cat feces.  It can cause birth defects, miscarriages, and other problems. Wash your hands after you touch raw meat. Make sure any meat is well-cooked before you eat it. Avoid raw eggs and unpasteurized milk. Use gloves or ask someone else to clean your cat's litter box while you are pregnant. Changes that are happening with your baby:  By 26 weeks, your baby will weigh about 2 pounds. Your baby will be about 10 inches long from the top of the head to the rump (baby's bottom). Your baby's movements are much stronger now. Your baby's eyes are almost completely formed and can partially open. Your baby also sleeps and wakes up. What you need to know about prenatal care: Your healthcare provider will check your blood pressure and weight. You may also need the following:  A urine test  may also be done to check for sugar and protein. These can be signs of gestational diabetes or infection. Protein in your urine may also be a sign of preeclampsia. Preeclampsia is a condition that can develop during week 20 or later of your pregnancy. It causes high blood pressure, and it can cause problems with your kidneys and other organs. A gestational diabetes screen  may be done. Your healthcare provider may order either a 1-step or 2-step oral glucose tolerance test (OGTT). 1-step OGTT:  Your blood sugar level will be tested after you have not eaten for 8 hours (fasting). You will then be given a glucose drink. Your level will be tested again 1 hour and 2 hours after you finish the drink. 2-step OGTT:  You do not have to fast for the first part of the test. You will have the glucose drink at any time of day. Your blood sugar level will be checked 1 hour later. If your blood sugar is higher than a certain level, another test will be ordered. You will fast and your blood sugar level will be tested. You will have the glucose drink. Your blood will be tested again 1 hour, 2 hours, and 3 hours after you finish the glucose drink.     Fundal height  is a measurement of your uterus to check your baby's growth. This number is usually the same as the number of weeks that you have been pregnant. Your baby's heart rate  will be checked. Follow up with your doctor or obstetrician as directed:  Write down your questions so you remember to ask them during your visits. © Copyright [facility] 2022 Information is for End User's use only and may not be sold, redistributed or otherwise used for commercial purposes. The above information is an  only. It is not intended as medical advice for individual conditions or treatments. Talk to your doctor, nurse or pharmacist before following any medical regimen to see if it is safe and effective for you.

## 2023-07-29 NOTE — PROGRESS NOTES
OB/GYN  PN Visit  Elaine Anaya  [id number]  2023  8:56 AM  Dr. Juan José Olivas MD    S[facility] 29 [facility]. Chief Complaint   Patient presents with   • Routine Prenatal Visit     Pt reports no concerns         OB complaints:  Contractions: no  Leakage: no  Bleeding: no  Fetal movement: yes      O:  /70 (BP Location: Right arm, Cuff Size: Standard)   Pulse 92   Temp 98.1 °F (36.7 °C) (Tympanic)   Wt 78.4 kg (172 lb 12.8 oz)   LMP 2023 (Exact Date)   BMI 27.06 kg/m²       Review of Systems   Constitutional: Negative. HENT: Negative. Eyes: Negative. Respiratory: Negative. Cardiovascular: Negative. Gastrointestinal: Negative. Endocrine: Negative. Genitourinary:        As noted in HPI   Musculoskeletal: Negative. Skin: Negative. Allergic/Immunologic: Negative. Neurological: Negative. Hematological: Negative. Psychiatric/Behavioral: Negative. Physical Exam  Constitutional:       General: She is not in acute distress. Appearance: She is well-developed. Abdominal:      Palpations: Abdomen is soft. Tenderness: There is no abdominal tenderness. There is no guarding. Neurological:      Mental Status: She is alert and oriented to person, place, and time. Skin:     General: Skin is warm and dry.    Psychiatric:         Behavior: Behavior normal.             Pregravid Weight/BMI: 73.5 kg (162 lb) (BMI 25.37)  Current Weight: 78.4 kg (172 lb 12.8 oz)   Total Weight Gain: 4.899 kg (10 lb 12.8 oz)   Pre- Vitals    Flowsheet Row Most Recent Value   Prenatal Assessment    Fetal Heart Rate 143   Fundal Height (cm) 24 cm   Movement Present   Prenatal Vitals    Blood Pressure 104/70   Weight - Scale 78.4 kg (172 lb 12.8 oz)   Urine Albumin/Glucose    Dilation/Effacement/Station    Vaginal Drainage    Edema            Problem List        Digestive    Focal nodular hyperplasia of liver       Other    Seasonal allergies    SALLIE (generalized anxiety disorder)    Family history of thromboembolic disease    Overview     Thrombophilia labs: negative         Family history of cardiovascular disease    Supervision of normal first pregnancy, antepartum    Overview       CF/ SMA testing: negative  Flu vaccine:  completed  Covid vaccine:  completed X 2           23 weeks gestation of pregnancy   Other Visit Diagnoses     Second trimester pregnancy    -  Primary         24-28 week labs ordered  TDAP next visit  Follow-up growth scan at 32 weeks      Future Appointments   Date Time Provider [address]   2023  9:45 AM   [facility]   2023  1:00 PM Akua Henderson PA-C [facility]-Baldomero Rodriguez MD  2023  8:56 AM

## 2023-07-31 ENCOUNTER — ROUTINE PRENATAL (OUTPATIENT)
Dept: OBGYN CLINIC | Facility: CLINIC | Age: 28
End: 2023-07-31

## 2023-07-31 VITALS
DIASTOLIC BLOOD PRESSURE: 70 MMHG | SYSTOLIC BLOOD PRESSURE: 104 MMHG | HEART RATE: 92 BPM | BODY MASS INDEX: 27.06 KG/M2 | TEMPERATURE: 98.1 F | WEIGHT: 172.8 LBS

## 2023-07-31 DIAGNOSIS — Z82.49 FAMILY HISTORY OF CARDIOVASCULAR DISEASE: ICD-10-CM

## 2023-07-31 DIAGNOSIS — Z34.92 SECOND TRIMESTER PREGNANCY: Primary | ICD-10-CM

## 2023-07-31 DIAGNOSIS — Z82.49 FAMILY HISTORY OF THROMBOEMBOLIC DISEASE: ICD-10-CM

## 2023-07-31 DIAGNOSIS — Z3A.23 23 WEEKS GESTATION OF PREGNANCY: ICD-10-CM

## 2023-07-31 DIAGNOSIS — Z34.00 SUPERVISION OF NORMAL FIRST PREGNANCY, ANTEPARTUM: ICD-10-CM

## 2023-07-31 PROCEDURE — PNV: Performed by: OBSTETRICS & GYNECOLOGY

## 2023-08-02 ENCOUNTER — APPOINTMENT (OUTPATIENT)
Dept: LAB | Facility: MEDICAL CENTER | Age: 28
End: 2023-08-02
Payer: COMMERCIAL

## 2023-08-02 DIAGNOSIS — Z3A.23 23 WEEKS GESTATION OF PREGNANCY: ICD-10-CM

## 2023-08-02 LAB
BASOPHILS # BLD AUTO: 0.02 THOUSANDS/ÂΜL (ref 0–0.1)
BASOPHILS NFR BLD AUTO: 0 % (ref 0–1)
EOSINOPHIL # BLD AUTO: 0.04 THOUSAND/ÂΜL (ref 0–0.61)
EOSINOPHIL NFR BLD AUTO: 1 % (ref 0–6)
ERYTHROCYTE [DISTWIDTH] IN BLOOD BY AUTOMATED COUNT: 13 % (ref 11.6–15.1)
GLUCOSE 1H P 50 G GLC PO SERPL-MCNC: 115 MG/DL (ref 40–134)
HCT VFR BLD AUTO: 35.1 % (ref 34.8–46.1)
HGB BLD-MCNC: 11.7 G/DL (ref 11.5–15.4)
IMM GRANULOCYTES # BLD AUTO: 0.03 THOUSAND/UL (ref 0–0.2)
IMM GRANULOCYTES NFR BLD AUTO: 0 % (ref 0–2)
LYMPHOCYTES # BLD AUTO: 1.39 THOUSANDS/ÂΜL (ref 0.6–4.47)
LYMPHOCYTES NFR BLD AUTO: 17 % (ref 14–44)
MCH RBC QN AUTO: 32.1 PG (ref 26.8–34.3)
MCHC RBC AUTO-ENTMCNC: 33.3 G/DL (ref 31.4–37.4)
MCV RBC AUTO: 96 FL (ref 82–98)
MONOCYTES # BLD AUTO: 0.34 THOUSAND/ÂΜL (ref 0.17–1.22)
MONOCYTES NFR BLD AUTO: 4 % (ref 4–12)
NEUTROPHILS # BLD AUTO: 6.2 THOUSANDS/ÂΜL (ref 1.85–7.62)
NEUTS SEG NFR BLD AUTO: 78 % (ref 43–75)
NRBC BLD AUTO-RTO: 0 /100 WBCS
PLATELET # BLD AUTO: 222 THOUSANDS/UL (ref 149–390)
PMV BLD AUTO: 11 FL (ref 8.9–12.7)
RBC # BLD AUTO: 3.64 MILLION/UL (ref 3.81–5.12)
TREPONEMA PALLIDUM IGG+IGM AB [PRESENCE] IN SERUM OR PLASMA BY IMMUNOASSAY: NORMAL
WBC # BLD AUTO: 8.02 THOUSAND/UL (ref 4.31–10.16)

## 2023-08-02 PROCEDURE — 85025 COMPLETE CBC W/AUTO DIFF WBC: CPT

## 2023-08-02 PROCEDURE — 86780 TREPONEMA PALLIDUM: CPT

## 2023-08-02 PROCEDURE — 36415 COLL VENOUS BLD VENIPUNCTURE: CPT

## 2023-08-02 PROCEDURE — 82950 GLUCOSE TEST: CPT

## 2023-08-26 PROBLEM — Z3A.27 27 WEEKS GESTATION OF PREGNANCY: Status: ACTIVE | Noted: 2023-04-07

## 2023-08-26 NOTE — PROGRESS NOTES
OB/GYN  PN Visit  Shasha Rudolph  [id number]  2023  9:41 AM  Dr. Cookie Shoemaker MD    S: 29 y.o. Ken Jones 27w6d here for PN visit. Chief Complaint   Patient presents with   • Routine Prenatal Visit         OB complaints:  Contractions: no  Leakage: no  Bleeding: no  Fetal movement: yes      O:  /72 (BP Location: Right arm, Patient Position: Sitting, Cuff Size: Adult)   Pulse 94   Ht 5' 7" (1.702 m)   Wt 80.5 kg (177 lb 6.4 oz)   LMP 2023 (Exact Date)   BMI 27.78 kg/m²       Review of Systems   Constitutional: Negative. HENT: Negative. Eyes: Negative. Respiratory: Negative. Cardiovascular: Negative. Gastrointestinal: Negative. Endocrine: Negative. Genitourinary:        As noted in HPI   Musculoskeletal: Negative. Skin: Negative. Allergic/Immunologic: Negative. Neurological: Negative. Hematological: Negative. Psychiatric/Behavioral: Negative. Physical Exam  Constitutional:       General: She is not in acute distress. Appearance: She is well-developed. Abdominal:      Palpations: Abdomen is soft. Tenderness: There is no abdominal tenderness. There is no guarding. Neurological:      Mental Status: She is alert and oriented to person, place, and time. Skin:     General: Skin is warm and dry.    Psychiatric:         Behavior: Behavior normal.             Pregravid Weight/BMI: 73.5 kg (162 lb) (BMI 25.37)  Current Weight: 80.5 kg (177 lb 6.4 oz)   Total Weight Gain: 6.985 kg (15 lb 6.4 oz)   Pre-Carlos Vitals    Flowsheet Row Most Recent Value   Prenatal Assessment    Fetal Heart Rate 143   Fundal Height (cm) 28 cm   Movement Present   Prenatal Vitals    Blood Pressure 122/72   Weight - Scale 80.5 kg (177 lb 6.4 oz)   Urine Albumin/Glucose    Dilation/Effacement/Station    Vaginal Drainage    Edema            Problem List        Digestive    Focal nodular hyperplasia of liver       Other    Seasonal allergies    SALLIE (generalized anxiety disorder)    Family history of thromboembolic disease    Overview     Thrombophilia labs: negative         Family history of cardiovascular disease    Supervision of normal first pregnancy, antepartum    Overview       CF/ SMA testing: negative  Flu vaccine:  completed  Covid vaccine:  completed X 2           27 weeks gestation of pregnancy   Other Visit Diagnoses     Second trimester pregnancy        Need for Tdap vaccination               32 weeks growth scan  TDAP today  Planning on condoms for contraception  32 week folder      Future Appointments   Date Time Provider [address]   2023  4:15 PM James Ledesma MD Complete [facility]   2023  9:45 AM   [place]   2023 10:30 AM James Ledesma MD Complete  Practice-Wo   2023  1:00 PM Chanda Byrd PA-C [facility]   10/10/2023  8:00 AM James Ledesma MD Complete  Practice-Wo   10/26/2023  8:00 AM James Ledesma MD Complete  Practice-Wo   11/3/2023  8:45 AM James Ledesma MD Complete  Practice-Wo   11/10/2023  8:15 AM James Ledesma MD Complete  Practice-Wo   2023  8:00 AM James Ledesma MD Complete  Practice-Wo   2023  8:00 AM James Ledesma MD Complete  Practice-Wo               James Ledesma MD  2023  9:41 AM

## 2023-08-29 ENCOUNTER — ROUTINE PRENATAL (OUTPATIENT)
Dept: OBGYN CLINIC | Facility: CLINIC | Age: 28
End: 2023-08-29
Payer: COMMERCIAL

## 2023-08-29 VITALS
SYSTOLIC BLOOD PRESSURE: 122 MMHG | WEIGHT: 177.4 LBS | BODY MASS INDEX: 27.84 KG/M2 | HEART RATE: 94 BPM | DIASTOLIC BLOOD PRESSURE: 72 MMHG | HEIGHT: 67 IN

## 2023-08-29 DIAGNOSIS — Z23 NEED FOR TDAP VACCINATION: ICD-10-CM

## 2023-08-29 DIAGNOSIS — Z34.92 SECOND TRIMESTER PREGNANCY: ICD-10-CM

## 2023-08-29 DIAGNOSIS — Z34.00 SUPERVISION OF NORMAL FIRST PREGNANCY, ANTEPARTUM: ICD-10-CM

## 2023-08-29 DIAGNOSIS — Z3A.27 27 WEEKS GESTATION OF PREGNANCY: Primary | ICD-10-CM

## 2023-08-29 PROCEDURE — 90471 IMMUNIZATION ADMIN: CPT

## 2023-08-29 PROCEDURE — PNV: Performed by: OBSTETRICS & GYNECOLOGY

## 2023-08-29 PROCEDURE — 90715 TDAP VACCINE 7 YRS/> IM: CPT

## 2023-09-13 PROBLEM — Z3A.30 30 WEEKS GESTATION OF PREGNANCY: Status: ACTIVE | Noted: 2023-04-07

## 2023-09-13 NOTE — PATIENT INSTRUCTIONS
Pregnancy at 27 to 30 [place]:   You may notice new symptoms such as shortness of breath, heartburn, or swelling of your ankles and feet. You may also have trouble sleeping or contractions. DISCHARGE INSTRUCTIONS:   Return to the emergency department if:   You develop a severe headache that does not go away. You have new or increased vision changes, such as blurred or spotted vision. You have new or increased swelling in your face or hands. You have vaginal spotting or bleeding. Your water broke or you feel warm water gushing or trickling from your vagina. Call your doctor or obstetrician if:   You have more than 5 contractions in 1 hour. You notice any changes in your baby's movements. You have abdominal cramps, pressure, or tightening. You have a change in vaginal discharge. You have chills or a fever. You have vaginal itching, burning, or pain. You have yellow, green, white, or foul-smelling vaginal discharge. You have pain or burning when you urinate, less urine than usual, or pink or bloody urine. You have questions or concerns about your condition or care. How to care for yourself at this stage of your pregnancy:       Eat a variety of healthy foods. Healthy foods include fruits, vegetables, whole-grain breads, low-fat dairy foods, beans, lean meats, and fish. Drink liquids as directed. Ask how much liquid to drink each day and which liquids are best for you. Limit caffeine to less than 200 milligrams each day. Limit your intake of fish to 2 servings each week. Choose fish low in mercury such as canned light tuna, shrimp, salmon, cod, or tilapia. Do not  eat fish high in mercury such as swordfish, tilefish, man mackerel, and shark. Manage heartburn  by eating 4 or 5 small meals each day instead of large meals. Avoid spicy food. Manage swelling  by lying down and putting your feet up. Take prenatal vitamins as directed. Your need for certain vitamins and minerals, such as folic acid, increases during pregnancy. Prenatal vitamins provide some of the extra vitamins and minerals you need. Prenatal vitamins may also help to decrease the risk of certain birth defects. Talk to your healthcare provider about exercise. Moderate exercise can help you stay fit. Your healthcare provider will help you plan an exercise program that is safe for you during pregnancy. Do not smoke. Smoking increases your risk of a miscarriage and other health problems during your pregnancy. Smoking can cause your baby to be born too early or weigh less at birth. Ask your healthcare provider for information if you need help quitting. Do not drink alcohol. Alcohol passes from your body to your baby through the placenta. It can affect your baby's brain development and cause fetal alcohol syndrome (FAS). FAS is a group of conditions that causes mental, behavior, and growth problems. Talk to your healthcare provider before you take any medicines. Many medicines may harm your baby if you take them when you are pregnant. Do not take any medicines, vitamins, herbs, or supplements without first talking to your healthcare provider. Never use illegal or street drugs (such as marijuana or cocaine) while you are pregnant. Safety tips during pregnancy:   Avoid hot tubs and saunas. Do not use a hot tub or sauna while you are pregnant, especially during your first trimester. Hot tubs and saunas may raise your baby's temperature and increase the risk of birth defects. Avoid toxoplasmosis. This is an infection caused by eating raw meat or being around infected cat feces. It can cause birth defects, miscarriages, and other problems. Wash your hands after you touch raw meat. Make sure any meat is well-cooked before you eat it. Avoid raw eggs and unpasteurized milk. Use gloves or ask someone else to clean your cat's litter box while you are pregnant. Changes that are happening with your baby:  By 30 weeks, your baby may weigh more than 3 pounds. Your baby may be about 11 inches long from the top of the head to the rump (baby's bottom). Your baby's eyes open and close now. Your baby's kicks and movements are more forceful at this time. What you need to know about prenatal care: Your healthcare provider will check your blood pressure and weight. You may also need the following:  Blood tests  may be done to check for anemia or blood type. A urine test  may also be done to check for sugar and protein. These can be signs of gestational diabetes or infection. Protein in your urine may also be a sign of preeclampsia. Preeclampsia is a condition that can develop during week 20 or later of your pregnancy. It causes high blood pressure, and it can cause problems with your kidneys and other organs. A Tdap vaccine and flu vaccine  may be recommended by your healthcare provider. A gestational diabetes screen  may be done. Your healthcare provider may order either a 1-step or 2-step oral glucose tolerance test (OGTT). 1-step OGTT:  Your blood sugar level will be tested after you have not eaten for 8 hours (fasting). You will then be given a glucose drink. Your level will be tested again 1 hour and 2 hours after you finish the drink. 2-step OGTT:  You do not have to fast for the first part of the test. You will have the glucose drink at any time of day. Your blood sugar level will be checked 1 hour later. If your blood sugar is higher than a certain level, another test will be ordered. You will fast and your blood sugar level will be tested. You will have the glucose drink. Your blood will be tested again 1 hour, 2 hours, and 3 hours after you finish the glucose drink. Fundal height  is a measurement of your uterus to check your baby's growth. This number is usually the same as the number of weeks that you have been pregnant.  Your healthcare provider may also check your baby's position. Your baby's heart rate  will be checked. Follow up with your doctor or obstetrician as directed:  Write down your questions so you remember to ask them during your visits. © Copyright Abby Ahuja 2022 Information is for End User's use only and may not be sold, redistributed or otherwise used for commercial purposes. The above information is an  only. It is not intended as medical advice for individual conditions or treatments. Talk to your doctor, nurse or pharmacist before following any medical regimen to see if it is safe and effective for you.

## 2023-09-13 NOTE — PROGRESS NOTES
OB/GYN  PN Visit  Hayes Keller  [id number]  2023  9:14 PM  Dr. Nohemi Maddox MD    S: 29 y.o. Marlon Lopes 30w1d here for PN visit. Chief Complaint   Patient presents with   • Routine Prenatal Visit         OB complaints:  Contractions: no  Leakage: no  Bleeding: no  Fetal movement: yes      O:  /68 (BP Location: Right arm, Patient Position: Sitting, Cuff Size: Adult)   Pulse 70   Ht 5' 7" (1.702 m)   Wt 81.2 kg (179 lb)   LMP 2023 (Exact Date)   BMI 28.04 kg/m²       Review of Systems   Constitutional: Negative. HENT: Negative. Eyes: Negative. Respiratory: Negative. Cardiovascular: Negative. Gastrointestinal: Negative. Endocrine: Negative. Genitourinary:        As noted in HPI   Musculoskeletal: Negative. Skin: Negative. Allergic/Immunologic: Negative. Neurological: Negative. Hematological: Negative. Psychiatric/Behavioral: Negative. Physical Exam  Constitutional:       General: She is not in acute distress. Appearance: She is well-developed. Abdominal:      Palpations: Abdomen is soft. Tenderness: There is no abdominal tenderness. There is no guarding. Neurological:      Mental Status: She is alert and oriented to person, place, and time. Skin:     General: Skin is warm and dry.    Psychiatric:         Behavior: Behavior normal.       140      Pregravid Weight/BMI: 73.5 kg (162 lb) (BMI 25.37)  Current Weight: 81.2 kg (179 lb)   Total Weight Gain: 7.711 kg (17 lb)   Pre- Vitals    Flowsheet Row Most Recent Value   Prenatal Assessment    Fetal Heart Rate 140   Fundal Height (cm) 30 cm   Movement Present   Prenatal Vitals    Blood Pressure 116/68   Weight - Scale 81.2 kg (179 lb)   Urine Albumin/Glucose    Dilation/Effacement/Station    Vaginal Drainage    Edema            Problem List        Digestive    Focal nodular hyperplasia of liver       Other    Seasonal allergies    SALLIE (generalized anxiety disorder)    Family history of thromboembolic disease    Overview     Thrombophilia labs: negative         Family history of cardiovascular disease    Supervision of normal first pregnancy, antepartum    Overview       CF/ SMA testing: negative  Flu vaccine:  completed  Covid vaccine:  completed X 2           30 weeks gestation of pregnancy   Other Visit Diagnoses     Third trimester pregnancy    -  Primary         Follow-up in 2 weeks  growth scan in 2 weeks  Flu vaccine when available    Future Appointments   Date Time Provider [address]  [address]   2023  8:00 AM   [place]   2023  3:45 PM Juan J Love MD Complete  Practice-Wom   2023  1:00 PM Izabela Hubbard PA-C [facility]   10/10/2023  8:00 AM Juan J Love MD Complete [facility]   10/26/2023  8:00 AM Juan J Love MD Complete [facility]   11/3/2023  8:45 AM Juan J Love MD Complete [facility]   11/10/2023  8:15 AM Juan J Love MD Complete [facility]   2023  8:00 AM Juan J Love MD Complete [facility]   2023  8:00 AM Juan J Love MD Complete Tyson Ashraf MD  2023  9:14 PM

## 2023-09-14 ENCOUNTER — ROUTINE PRENATAL (OUTPATIENT)
Dept: OBGYN CLINIC | Facility: CLINIC | Age: 28
End: 2023-09-14

## 2023-09-14 VITALS
HEIGHT: 67 IN | BODY MASS INDEX: 28.09 KG/M2 | HEART RATE: 70 BPM | WEIGHT: 179 LBS | SYSTOLIC BLOOD PRESSURE: 116 MMHG | DIASTOLIC BLOOD PRESSURE: 68 MMHG

## 2023-09-14 DIAGNOSIS — Z34.00 SUPERVISION OF NORMAL FIRST PREGNANCY, ANTEPARTUM: ICD-10-CM

## 2023-09-14 DIAGNOSIS — Z82.49 FAMILY HISTORY OF THROMBOEMBOLIC DISEASE: ICD-10-CM

## 2023-09-14 DIAGNOSIS — Z34.93 THIRD TRIMESTER PREGNANCY: Primary | ICD-10-CM

## 2023-09-14 DIAGNOSIS — Z3A.30 30 WEEKS GESTATION OF PREGNANCY: ICD-10-CM

## 2023-09-14 PROCEDURE — PNV: Performed by: OBSTETRICS & GYNECOLOGY

## 2023-09-17 LAB
DME PARACHUTE DELIVERY DATE ACTUAL: NORMAL
DME PARACHUTE DELIVERY DATE REQUESTED: NORMAL
DME PARACHUTE ITEM DESCRIPTION: NORMAL
DME PARACHUTE ORDER STATUS: NORMAL
DME PARACHUTE SUPPLIER NAME: NORMAL
DME PARACHUTE SUPPLIER PHONE: NORMAL

## 2023-09-24 NOTE — PROGRESS NOTES
Please refer to the [facility] ultrasound report in Ob Procedures for additional information regarding today's visit

## 2023-09-25 PROBLEM — Z3A.32 32 WEEKS GESTATION OF PREGNANCY: Status: ACTIVE | Noted: 2023-04-07

## 2023-09-25 NOTE — PATIENT INSTRUCTIONS
Pregnancy at 31 to [address] [place]:   You may continue to have symptoms such as shortness of breath, heartburn, contractions, or swelling of your ankles and feet. You may be gaining about 1 pound a week now. DISCHARGE INSTRUCTIONS:   Return to the emergency department if:   You develop a severe headache that does not go away. You have new or increased vision changes, such as blurred or spotted vision. You have new or increased swelling in your face or hands. You have vaginal spotting or bleeding. Your water broke or you feel warm water gushing or trickling from your vagina. Call your obstetrician if:   You have more than 5 contractions in 1 hour. You notice any changes in your baby's movements. You have abdominal cramps, pressure, or tightening. You have a change in vaginal discharge. You have chills or a fever. You have vaginal itching, burning, or pain. You have yellow, green, white, or foul-smelling vaginal discharge. You have pain or burning when you urinate, less urine than usual, or pink or bloody urine. You have questions or concerns about your condition or care. How to care for yourself at this stage of your pregnancy:       Eat a variety of healthy foods. Healthy foods include fruits, vegetables, whole-grain breads, low-fat dairy foods, beans, lean meats, and fish. Drink liquids as directed. Ask how much liquid to drink each day and which liquids are best for you. Limit caffeine to less than 200 milligrams each day. Limit your intake of fish to 2 servings each week. Choose fish low in mercury such as canned light tuna, shrimp, salmon, cod, or tilapia. Do not  eat fish high in mercury such as swordfish, tilefish, man mackerel, and shark. Manage heartburn  by eating 4 or 5 small meals each day instead of large meals. Avoid spicy food. Manage swelling  by lying down and putting your feet up. Take prenatal vitamins as directed. Your need for certain vitamins and minerals, such as folic acid, increases during pregnancy. Prenatal vitamins provide some of the extra vitamins and minerals you need. Prenatal vitamins may also help to decrease the risk of certain birth defects. Talk to your healthcare provider about exercise. Moderate exercise can help you stay fit. Your healthcare provider will help you plan an exercise program that is safe for you during pregnancy. Do not smoke. Smoking increases your risk of a miscarriage and other health problems during your pregnancy. Smoking can cause your baby to be born too early or weigh less at birth. Ask your healthcare provider for information if you need help quitting. Do not drink alcohol. Alcohol passes from your body to your baby through the placenta. It can affect your baby's brain development and cause fetal alcohol syndrome (FAS). FAS is a group of conditions that causes mental, behavior, and growth problems. Talk to your healthcare provider before you take any medicines. Many medicines may harm your baby if you take them when you are pregnant. Do not take any medicines, vitamins, herbs, or supplements without first talking to your healthcare provider. Never use illegal or street drugs (such as marijuana or cocaine) while you are pregnant. Safety tips during pregnancy:   Avoid hot tubs and saunas. Do not use a hot tub or sauna while you are pregnant, especially during your first trimester. Hot tubs and saunas may raise your baby's temperature and increase the risk of birth defects. Avoid toxoplasmosis. This is an infection caused by eating raw meat or being around infected cat feces. It can cause birth defects, miscarriages, and other problems. Wash your hands after you touch raw meat. Make sure any meat is well-cooked before you eat it. Avoid raw eggs and unpasteurized milk. Use gloves or ask someone else to clean your cat's litter box while you are pregnant. Changes happening with your baby:  By 34 weeks, your baby may weigh more than 5 pounds. Your baby will be about 12 ½ inches long from the top of the head to the rump (baby's bottom). Your baby is gaining about ½ pound a week. Your baby's eyes open and close now. Your baby's kicks and movements are more forceful at this time. What you need to know about prenatal care: Your healthcare provider will check your blood pressure and weight. You may also need the following:  A urine test  may also be done to check for sugar and protein. These can be signs of gestational diabetes or infection. Protein in your urine may also be a sign of preeclampsia. Preeclampsia is a condition that can develop during week 20 or later of your pregnancy. It causes high blood pressure, and it can cause problems with your kidneys and other organs. A gestational diabetes screen  may be done. Your healthcare provider may order either a 1-step or 2-step oral glucose tolerance test (OGTT). 1-step OGTT:  Your blood sugar level will be tested after you have not eaten for 8 hours (fasting). You will then be given a glucose drink. Your level will be tested again 1 hour and 2 hours after you finish the drink. 2-step OGTT:  You do not have to fast for the first part of the test. You will have the glucose drink at any time of day. Your blood sugar level will be checked 1 hour later. If your blood sugar is higher than a certain level, another test will be ordered. You will fast and your blood sugar level will be tested. You will have the glucose drink. Your blood will be tested again 1 hour, 2 hours, and 3 hours after you finish the glucose drink. A Tdap vaccine  may be recommended by your healthcare provider. Fundal height  is a measurement of your uterus to check your baby's growth. This number is usually the same as the number of weeks that you have been pregnant.  Your healthcare provider may also check your baby's position. Your baby's heart rate  will be checked. Follow up with your obstetrician as directed:  Write down your questions so you remember to ask them during your visits. © Copyright Nam Mancia 2023 Information is for End User's use only and may not be sold, redistributed or otherwise used for commercial purposes. The above information is an  only. It is not intended as medical advice for individual conditions or treatments. Talk to your doctor, nurse or pharmacist before following any medical regimen to see if it is safe and effective for you.

## 2023-09-25 NOTE — PROGRESS NOTES
OB/GYN  PN Visit  Eun Rees  [id number]  2023  3:58 PM  Dr. Krupa Quinones MD    S: 29 y.o. [place] 32 weeks here for PN visit. Chief Complaint   Patient presents with   • Routine Prenatal Visit         OB complaints:  Contractions: no  Leakage: no  Bleeding: no  Fetal movement: yes      O:  /74 (BP Location: Right arm, Patient Position: Sitting, Cuff Size: Adult)   Pulse 67   Ht 5' 7" (1.702 m)   Wt 82.1 kg (181 lb)   LMP 2023 (Exact Date)   BMI 28.35 kg/m²       Review of Systems   Constitutional: Negative. HENT: Negative. Eyes: Negative. Respiratory: Negative. Cardiovascular: Negative. Gastrointestinal: Negative. Endocrine: Negative. Genitourinary:        As noted in HPI   Musculoskeletal: Negative. Skin: Negative. Allergic/Immunologic: Negative. Neurological: Negative. Hematological: Negative. Psychiatric/Behavioral: Negative. Physical Exam  Constitutional:       General: She is not in acute distress. Appearance: She is well-developed. Abdominal:      Palpations: Abdomen is soft. Tenderness: There is no abdominal tenderness. There is no guarding. Neurological:      Mental Status: She is alert and oriented to person, place, and time. Skin:     General: Skin is warm and dry.    Psychiatric:         Behavior: Behavior normal.             Pregravid Weight/BMI: 73.5 kg (162 lb) (BMI 25.37)  Current Weight: 82.1 kg (181 lb)   Total Weight Gain: 8.618 kg (19 lb)   Pre-Carlos Vitals    Flowsheet Row Most Recent Value   Prenatal Assessment    Fetal Heart Rate 136   Fundal Height (cm) 30 cm   Movement Present   Presentation Vertex   Prenatal Vitals    Blood Pressure 122/74   Weight - Scale 82.1 kg (181 lb)   Urine Albumin/Glucose    Dilation/Effacement/Station    Vaginal Drainage    Edema            Problem List        Digestive    Focal nodular hyperplasia of liver       Other    Seasonal allergies    SALLIE (generalized anxiety disorder)    Family history of thromboembolic disease    Overview     Thrombophilia labs: negative         Family history of cardiovascular disease    Supervision of normal first pregnancy, antepartum    Overview       CF/ SMA testing: negative  Flu vaccine:  completed  Covid vaccine:  completed X 2           32 weeks gestation of pregnancy   Other Visit Diagnoses     Third trimester pregnancy    -  Primary         Declines flu vaccine today  Follow-up in 2 weeks  Growth scan in 3 weeks  24th percentile EFW      Future Appointments   Date Time Provider [address]00  [facility]   10/9/2023  8:30 AM Sidney Rehman MD Complete [facility]   10/20/2023  3:45 PM  US [address] [facility] [address]   10/26/2023  8:00 AM Sidney Rehman MD Complete [facility]   11/3/2023  8:45 AM Sidney Rehman MD Complete [facility]   11/10/2023  8:15 AM Sidney Rehman MD Complete [facility]   2023  8:00 AM Sidney Rehman MD Complete [facility]   2023 10:00 AM Sidney Rehman MD Complete [facility]               Sidney Rehman MD  2023  3:58 PM

## 2023-09-26 ENCOUNTER — ROUTINE PRENATAL (OUTPATIENT)
Dept: OBGYN CLINIC | Facility: CLINIC | Age: 28
End: 2023-09-26

## 2023-09-26 ENCOUNTER — ULTRASOUND (OUTPATIENT)
Dept: PERINATAL CARE | Facility: OTHER | Age: 28
End: 2023-09-26
Payer: COMMERCIAL

## 2023-09-26 VITALS
HEART RATE: 86 BPM | SYSTOLIC BLOOD PRESSURE: 102 MMHG | DIASTOLIC BLOOD PRESSURE: 66 MMHG | WEIGHT: 181 LBS | HEIGHT: 67 IN | BODY MASS INDEX: 28.41 KG/M2

## 2023-09-26 VITALS
HEART RATE: 67 BPM | WEIGHT: 181 LBS | DIASTOLIC BLOOD PRESSURE: 74 MMHG | SYSTOLIC BLOOD PRESSURE: 122 MMHG | BODY MASS INDEX: 28.41 KG/M2 | HEIGHT: 67 IN

## 2023-09-26 DIAGNOSIS — Z3A.32 32 WEEKS GESTATION OF PREGNANCY: Primary | ICD-10-CM

## 2023-09-26 DIAGNOSIS — Z34.93 THIRD TRIMESTER PREGNANCY: Primary | ICD-10-CM

## 2023-09-26 DIAGNOSIS — Z36.4 ULTRASOUND FOR ANTENATAL SCREENING FOR FETAL GROWTH RESTRICTION: ICD-10-CM

## 2023-09-26 DIAGNOSIS — Z34.00 SUPERVISION OF NORMAL FIRST PREGNANCY, ANTEPARTUM: ICD-10-CM

## 2023-09-26 DIAGNOSIS — Z3A.32 32 WEEKS GESTATION OF PREGNANCY: ICD-10-CM

## 2023-09-26 PROCEDURE — 76816 OB US FOLLOW-UP PER FETUS: CPT | Performed by: OBSTETRICS & GYNECOLOGY

## 2023-09-26 PROCEDURE — PNV: Performed by: OBSTETRICS & GYNECOLOGY

## 2023-09-26 NOTE — LETTER
September 26, 2023     Carl David, [address] [address]    Patient: Rober Dubin   YOB: 1995   Date of Visit: 9/26/2023       Dear Dr. Tosin Zaman: Thank you for referring Rober Dubin to me for evaluation. Below are my notes for this consultation. If you have questions, please do not hesitate to call me. I look forward to following your patient along with you.          Sincerely,        Eyal Richard MD        CC: No Recipients    Eyal Richard MD  9/24/2023  4:51 PM  Sign when Signing Visit  Please refer to the [facility] ultrasound report in Ob Procedures for additional information regarding today's visit

## 2023-09-26 NOTE — PATIENT INSTRUCTIONS
Kick Counts in Pregnancy   WHAT YOU NEED TO KNOW:   Kick counts measure how much your baby is moving in your womb. A kick from your baby can be felt as a twist, turn, swish, roll, or jab. It is common to feel your baby kicking at 26 to 28 weeks of pregnancy. You may feel your baby kick as early as 20 weeks of pregnancy. You may want to start counting at 28 weeks. DISCHARGE INSTRUCTIONS:   Contact your doctor immediately if:   You feel a change in the number of kicks or movements of your baby. You feel fewer than 10 kicks within 2 hours. You have questions or concerns about your baby's movements. Why measure kick counts:  Your baby's movement may provide information about your baby's health. He or she may move less, or not at all, if there are problems. Your baby may move less if he or she is not getting enough oxygen or nutrition from the placenta. Do not smoke while you are pregnant. Smoking decreases the amount of oxygen that gets to your baby. Talk to your healthcare provider if you need help to quit smoking. Tell your healthcare provider as soon as you feel a change in your baby's movements. When to measure kick counts:   Measure kick counts at the same time every day. Measure kick counts when your baby is awake and most active. Your baby may be most active in the evening. How to measure kick counts:  Check that your baby is awake before you measure kick counts. You can wake up your baby by lightly pushing on your belly, walking, or drinking something cold. Your healthcare provider may tell you different ways to measure kick counts. You may be told to do the following:  Use a chart or clock to keep track of the time you start and finish counting. Sit in a chair or lie on your left side. Place your hands on the largest part of your belly. Count until you reach 10 kicks. Write down how much time it takes to count 10 kicks. It may take 30 minutes to 2 hours to count 10 kicks. It should not take more than 2 hours to count 10 kicks. Follow up with your doctor as directed:  Write down your questions so you remember to ask them during your visits. © Copyright [employer] 2023 Information is for End User's use only and may not be sold, redistributed or otherwise used for commercial purposes. The above information is an  only. It is not intended as medical advice for individual conditions or treatments. Talk to your doctor, nurse or pharmacist before following any medical regimen to see if it is safe and effective for you.

## 2023-10-08 ENCOUNTER — HOSPITAL ENCOUNTER (OUTPATIENT)
Facility: HOSPITAL | Age: 28
End: 2023-10-08
Attending: OBSTETRICS & GYNECOLOGY | Admitting: OBSTETRICS & GYNECOLOGY
Payer: COMMERCIAL

## 2023-10-08 ENCOUNTER — HOSPITAL ENCOUNTER (OUTPATIENT)
Facility: HOSPITAL | Age: 28
Discharge: HOME | End: 2023-10-08
Attending: OBSTETRICS & GYNECOLOGY | Admitting: OBSTETRICS & GYNECOLOGY
Payer: COMMERCIAL

## 2023-10-08 VITALS
RESPIRATION RATE: 18 BRPM | TEMPERATURE: 98.4 F | SYSTOLIC BLOOD PRESSURE: 121 MMHG | DIASTOLIC BLOOD PRESSURE: 78 MMHG | HEART RATE: 61 BPM

## 2023-10-08 PROBLEM — Z3A.33 33 WEEKS GESTATION OF PREGNANCY: Status: ACTIVE | Noted: 2023-04-07

## 2023-10-08 PROCEDURE — 76817 TRANSVAGINAL US OBSTETRIC: CPT

## 2023-10-08 PROCEDURE — NC001 PR NO CHARGE: Performed by: OBSTETRICS & GYNECOLOGY

## 2023-10-08 PROCEDURE — 99213 OFFICE O/P EST LOW 20 MIN: CPT

## 2023-10-08 NOTE — PROGRESS NOTES
L&D Triage Note - OB/GYN  Sofia Frey 29 y.o. female MRN: [id number]  Unit/Bed#: L&D 324-01 Encounter: [id number]      ASSESSMENT:    Sofia Frey is a 29 y.o.  at 33w5d presenting for painful contractions at home. Negative  labor workup. Contractions spaced out prior to examination. Return precautions given. Pt to f/u on Tuesday with primary OB. PLAN:    1) r/o PTL   Denies abdominal pain at this time. Speculum examination: cervical os appears visually closed, no vaginal bleeding or pooling noted, moderate amount of white vaginal discharge noted  Nitrazine negative, Slides negative for ferning, clue cells, hyphae, trichomonads on microscopy  TVUS revelaed cervical length of 4.5 cm  SVE: 0/0/-4    Discharge Instructions:   Continue routine prenatal care  Discharge from [facility] triage with  labor precautions    - Reviewed rupture of membranes, false vs true labor, decreased fetal movement, and vaginal bleeding   - Pt to call provider with any concerns and follow up at her next scheduled prenatal appointment on 10/10/2023 at 1045 hrs. - Case discussed with Dr. Lomeli Kiss:    Sofia Frey 29 y.o. Shantisathya Ring at 33w5d with an Estimated Date of Delivery: 23     Presenting for evaluation of painful contractions at home. Reports the contraction were q15 min earlier today and became as close as 5-6 minutes apart and "took her breath away" when they occured. She denies any leakage of fluid, vaginal bleeding or decreased fetal movemnt  She denies any dysuria, heamturia, vaginal discharge or any other complaints at this time.      Her current obstetrical history is significant for unremarkable      OBJECTIVE:    Vitals:    10/08/23 0134   BP: 121/78   Pulse: 61   Resp: 18   Temp: 98.4 °F (36.9 °C)       ROS:  Constitutional: Negative  Respiratory: Negative  Cardiovascular: Negative    Gastrointestinal: Negative    General Physical Exam:  General: Well appearing, no distress  Respiratory: Unlabored breathing  Cardiovascular: Regular rate. Abdomen: Soft, gravid, nontender  Fundal Height: Appropriate for gestational age. Extremities: Warm and well perfused. Non tender.       Cervical Exam: Chaperone present  Speculum: cervical os appears visually closed, no vaginal bleeding or pooling noted, moderate amount of white vaginal discharge noted    SVE: Cervical Dilation: Closed  Cervical Effacement: 0  Cervical Consistency: Firm  Fetal Station: Ballotable  Presentation: Vertex  Method: Manual  OB Examiner: Lisbeth    Fetal monitoring:    FHT:  Baseline Rate: 135 bpm  Variability: Moderate 6-25 bpm  Accelerations: 15 x 15 or greater, At variable times    TOCO:   Contraction Frequency (minutes): irritability  Contraction Duration (seconds): 40-50  Contraction Quality: Mild    KOH/WTMT:     Infection:   - no clue cells    - no hyphae   - no trichomonads present    Membrane status   - no ferning   - negative Nitrazine   - no pooling     Imaging:       TVUS   - Cervical length    - 4.6 cm    - 4.5 cm   - Presentation: vertex      Griselda Escoto DO, OBGYN PGY-2  10/8/2023 5:35 AM

## 2023-10-08 NOTE — PROCEDURES
Eunkal Rees, a  at 33w5d with an EDITA of 2023, by Last Menstrual Period, was seen at [address] for the following procedure(s): $Procedure Type: US - Transvaginal]                   Ultrasound Other  Fetal Presentation: Vertex  Cervical Length: 4.5  Funnel: No  Debris: No  Placenta Previa: No  Vasa Previa: No       Dima Nicolas DO  10/08/23  5:30 AM

## 2023-10-09 ENCOUNTER — ROUTINE PRENATAL (OUTPATIENT)
Dept: PERINATAL CARE | Facility: OTHER | Age: 28
End: 2023-10-09
Payer: COMMERCIAL

## 2023-10-09 ENCOUNTER — ROUTINE PRENATAL (OUTPATIENT)
Dept: OBGYN CLINIC | Facility: CLINIC | Age: 28
End: 2023-10-09

## 2023-10-09 VITALS
HEART RATE: 83 BPM | HEIGHT: 67 IN | SYSTOLIC BLOOD PRESSURE: 132 MMHG | WEIGHT: 182 LBS | BODY MASS INDEX: 28.56 KG/M2 | DIASTOLIC BLOOD PRESSURE: 82 MMHG

## 2023-10-09 DIAGNOSIS — O36.8390 FETAL HEART RATE/RHYTHM ABNORMALITY, ANTEPARTUM: ICD-10-CM

## 2023-10-09 DIAGNOSIS — Z3A.33 33 WEEKS GESTATION OF PREGNANCY: ICD-10-CM

## 2023-10-09 DIAGNOSIS — Z34.93 THIRD TRIMESTER PREGNANCY: Primary | ICD-10-CM

## 2023-10-09 DIAGNOSIS — Z34.00 SUPERVISION OF NORMAL FIRST PREGNANCY, ANTEPARTUM: ICD-10-CM

## 2023-10-09 DIAGNOSIS — Z3A.33 33 WEEKS GESTATION OF PREGNANCY: Primary | ICD-10-CM

## 2023-10-09 DIAGNOSIS — O36.8390 FETAL ARRHYTHMIA AFFECTING PREGNANCY, ANTEPARTUM: ICD-10-CM

## 2023-10-09 PROCEDURE — 76819 FETAL BIOPHYS PROFIL W/O NST: CPT | Performed by: STUDENT IN AN ORGANIZED HEALTH CARE EDUCATION/TRAINING PROGRAM

## 2023-10-09 NOTE — PROGRESS NOTES
[facility]: Ms. Babak Tavera was seen today for biophysical profile. See ultrasound report under "OB Procedures" tab. Please don't hesitate to contact our office with any concerns or questions.   -Martita Drummond MD

## 2023-10-09 NOTE — PROGRESS NOTES
OB/GYN  PN Visit  Alexa Beaver  [id number]  10/10/2023  1:11 PM  Dr. Juan J Love MD    S: 29 y.o. Shola Sanchez 33w6d here for PN visit. Chief Complaint   Patient presents with   • Pregnancy Problem   • Routine Prenatal Visit       Patient presents for follow-up since hospital evaluation. Patient was initially seen on labor and delivery for  contractions. Patient states she still gets them rarely and not as painful. OB complaints:  Contractions: no  Leakage: no  Bleeding: no  Fetal movement: yes      O:  /82 (BP Location: Right arm, Patient Position: Sitting, Cuff Size: Adult)   Pulse 83   Ht 5' 7" (1.702 m)   Wt 82.6 kg (182 lb)   LMP 2023 (Exact Date)   BMI 28.51 kg/m²       Review of Systems   Constitutional: Negative. HENT: Negative. Eyes: Negative. Respiratory: Negative. Cardiovascular: Negative. Gastrointestinal: Negative. Endocrine: Negative. Genitourinary:        As noted in HPI   Musculoskeletal: Negative. Skin: Negative. Allergic/Immunologic: Negative. Neurological: Negative. Hematological: Negative. Psychiatric/Behavioral: Negative. Physical Exam  Constitutional:       General: She is not in acute distress. Appearance: She is well-developed. Abdominal:      Palpations: Abdomen is soft. Tenderness: There is no abdominal tenderness. There is no guarding. Neurological:      Mental Status: She is alert and oriented to person, place, and time. Skin:     General: Skin is warm and dry.    Psychiatric:         Behavior: Behavior normal.             Pregravid Weight/BMI: 73.5 kg (162 lb) (BMI 25.37)  Current Weight: 82.6 kg (182 lb)   Total Weight Gain: 9.072 kg (20 lb)   Pre-Carlos Vitals    Flowsheet Row Most Recent Value   Prenatal Assessment    Fetal Heart Rate 135   Prenatal Vitals    Blood Pressure 132/82   Weight - Scale 82.6 kg (182 lb)   Urine Albumin/Glucose    Dilation/Effacement/Station    Vaginal Drainage Edema            Problem List        Digestive    Focal nodular hyperplasia of liver       Other    Seasonal allergies    SALLIE (generalized anxiety disorder)    Family history of thromboembolic disease    Overview     Thrombophilia labs: negative         Family history of cardiovascular disease    Supervision of normal first pregnancy, antepartum    Overview       CF/ SMA testing: negative  Flu vaccine:  completed  Covid vaccine:  completed X 2           33 weeks gestation of pregnancy    Fetal arrhythmia affecting pregnancy, antepartum   Other Visit Diagnoses     Third trimester pregnancy    -  Primary    Flu vaccine need          NST was performed due to concern for ongoing  labor. Only 1 contraction was seen over the last 20 minutes. She states that her occasional contractions are not painful. She declined cervical examination today  Patient with noted premature atrial contractions on NST  Reviewed NST tracing with [facility] Dr. Juan Zhang  She was sent to [facility] today for [facility]  Advised weekly testing which was set up with [facility]. Advised decreased caffeine, avoidance of stress. Follow-up in 3 days for repeat NST in office for patient reassurance.       Future Appointments   Date Time Provider [address] [place] [address]   10/13/2023  9:00 AM Chuy Manriquez MD OBGYN [address] [facility]   10/16/2023  8:00 AM  [facility] [address] [place] [address]   10/20/2023  3:45 PM  US [address] [place] [address]   10/23/2023 11:00 AM  NURSE [address]   10/23/2023 11:30 AM  US [address]   10/26/2023  8:00 AM Chuy Manriquez MD Complete  [facility]   10/30/2023  9:15 AM  NURSE PAUL 2 BE [address] [place] [address]   11/3/2023  8:45 AM Chuy Manriquez MD Complete  [facility]   2023 12:15 PM  NURSE PAUL [facility]   11/10/2023  8:15 AM Chuy Manriquez MD Complete  [facility]   2023  8:00 AM Scarlett Nails, MD Complete  [facility]   2023  8:45 AM  NURSE PAUL [facility]   2023 10:00 AM Scarlett Francis MD Complete [facility] Nathan Heck MD  10/10/2023  1:11 PM

## 2023-10-10 PROBLEM — O36.8390 FETAL HEART RATE/RHYTHM ABNORMALITY, ANTEPARTUM: Status: ACTIVE | Noted: 2023-10-10

## 2023-10-13 ENCOUNTER — ROUTINE PRENATAL (OUTPATIENT)
Age: 28
End: 2023-10-13

## 2023-10-13 VITALS
SYSTOLIC BLOOD PRESSURE: 112 MMHG | HEART RATE: 114 BPM | OXYGEN SATURATION: 99 % | HEIGHT: 67 IN | WEIGHT: 182 LBS | DIASTOLIC BLOOD PRESSURE: 70 MMHG | BODY MASS INDEX: 28.56 KG/M2

## 2023-10-13 DIAGNOSIS — Z34.93 THIRD TRIMESTER PREGNANCY: Primary | ICD-10-CM

## 2023-10-13 DIAGNOSIS — Z3A.34 34 WEEKS GESTATION OF PREGNANCY: ICD-10-CM

## 2023-10-13 DIAGNOSIS — O36.8390 FETAL ARRHYTHMIA AFFECTING PREGNANCY, ANTEPARTUM: ICD-10-CM

## 2023-10-13 NOTE — PROGRESS NOTES
OB/GYN  PN Visit  Tony Clarks  [id number]  10/13/2023  1:03 PM  Dr. Navya Bradley MD    S: 29 y.o. Altagracia Keenan 34w3d here for PN visit. Chief Complaint   Patient presents with    Routine Prenatal Visit     NST     She has rare contractions    OB complaints:  Contractions: no  Leakage: no  Bleeding: no  Fetal movement: yes      O:  /70   Pulse (!) 114   Ht 5' 7" (1.702 m)   Wt 82.6 kg (182 lb)   LMP 2023 (Exact Date)   SpO2 99%   BMI 28.51 kg/m²       Review of Systems   Constitutional: Negative. HENT: Negative. Eyes: Negative. Respiratory: Negative. Cardiovascular: Negative. Gastrointestinal: Negative. Endocrine: Negative. Genitourinary:         As noted in HPI   Musculoskeletal: Negative. Skin: Negative. Allergic/Immunologic: Negative. Neurological: Negative. Hematological: Negative. Psychiatric/Behavioral: Negative. Physical Exam  Constitutional:       General: She is not in acute distress. Appearance: She is well-developed. Abdominal:      Palpations: Abdomen is soft. Tenderness: There is no abdominal tenderness. There is no guarding. Neurological:      Mental Status: She is alert and oriented to person, place, and time. Skin:     General: Skin is warm and dry.    Psychiatric:         Behavior: Behavior normal.             Pregravid Weight/BMI: 73.5 kg (162 lb) (BMI 25.37)  Current Weight: 82.6 kg (182 lb)   Total Weight Gain: 9.072 kg (20 lb)   Pre-Carlos Vitals      Flowsheet Row Most Recent Value   Prenatal Assessment    Fetal Heart Rate 156   Fundal Height (cm) 33 cm   Movement Present   Presentation Vertex   Prenatal Vitals    Blood Pressure 112/70   Weight - Scale 82.6 kg (182 lb)   Urine Albumin/Glucose    Dilation/Effacement/Station    Vaginal Drainage    Edema              Problem List          Digestive    Focal nodular hyperplasia of liver       Other    Seasonal allergies    SALLIE (generalized anxiety disorder) Family history of thromboembolic disease    Overview     Thrombophilia labs: negative         Family history of cardiovascular disease    Supervision of normal first pregnancy, antepartum    Overview       CF/ SMA testing: negative  Flu vaccine:  completed  Covid vaccine:  completed X 2           34 weeks gestation of pregnancy    Fetal arrhythmia affecting pregnancy, antepartum    Fetal heart rate/rhythm abnormality, antepartum     Other Visit Diagnoses       Third trimester pregnancy    -  Primary           Declines flu vaccine today  Growth scan next week  Weekly NST and JENNIFER for now due to [facility]          Future Appointments   Date Time Provider [address]   10/20/2023  3:15  [facility]   10/20/2023  3:45 PM  US 1 [facility]   10/23/2023 11:00  E [facility]   10/23/2023 11:30 AM  US 1 [facility]   10/26/2023  8:00 AM Karyle Fort, MD Complete  [facility]   10/30/2023  9:15 AM  NURSE PAUL [facility]   11/3/2023  8:45 AM Karyle Fort, MD Complete  [facility]   2023 12:15 PM [address] [facility]   11/10/2023  8:15 AM Karyle Fort, MD Complete  [facility]   2023  8:00 AM Karyle Fort, MD Complete  [facility]   2023  8:45 AM  NURSE PAUL 2  [address] [facility]   2023 10:00 AM Karyle Fort, MD Complete  Yamilet Banda MD  10/13/2023  1:03 PM

## 2023-10-19 ENCOUNTER — ROUTINE PRENATAL (OUTPATIENT)
Dept: OBGYN CLINIC | Facility: CLINIC | Age: 28
End: 2023-10-19

## 2023-10-19 VITALS — SYSTOLIC BLOOD PRESSURE: 124 MMHG | WEIGHT: 182.9 LBS | BODY MASS INDEX: 28.65 KG/M2 | DIASTOLIC BLOOD PRESSURE: 80 MMHG

## 2023-10-19 DIAGNOSIS — O36.8390 FETAL ARRHYTHMIA AFFECTING PREGNANCY, ANTEPARTUM: ICD-10-CM

## 2023-10-19 DIAGNOSIS — Z3A.35 35 WEEKS GESTATION OF PREGNANCY: ICD-10-CM

## 2023-10-19 DIAGNOSIS — O36.8390 FETAL HEART RATE/RHYTHM ABNORMALITY, ANTEPARTUM: ICD-10-CM

## 2023-10-19 DIAGNOSIS — Z34.00 SUPERVISION OF NORMAL FIRST PREGNANCY, ANTEPARTUM: Primary | ICD-10-CM

## 2023-10-19 DIAGNOSIS — Z34.93 THIRD TRIMESTER PREGNANCY: ICD-10-CM

## 2023-10-19 NOTE — PATIENT INSTRUCTIONS
Pregnancy at 28 to [address] [facility]:   You are considered full term at the beginning of 37 weeks. Your breathing may be easier if your baby has moved down into a head-down position. You may need to urinate more often because the baby may be pressing on your bladder. You may also feel more discomfort and get tired easily. DISCHARGE INSTRUCTIONS:   Return to the emergency department if:   You develop a severe headache that does not go away. You have new or increased vision changes, such as blurred or spotted vision. You have new or increased swelling in your face or hands. You have vaginal spotting or bleeding. Your water broke or you feel warm water gushing or trickling from your vagina. Call your obstetrician if:   You have more than 5 contractions in 1 hour. You notice any changes in your baby's movements. You have abdominal cramps, pressure, or tightening. You have a change in vaginal discharge. You have chills or a fever. You have vaginal itching, burning, or pain. You have yellow, green, white, or foul-smelling vaginal discharge. You have pain or burning when you urinate, less urine than usual, or pink or bloody urine. You have questions or concerns about your condition or care. How to care for yourself at this stage of your pregnancy:       Eat a variety of healthy foods. Healthy foods include fruits, vegetables, whole-grain breads, low-fat dairy foods, beans, lean meats, and fish. Drink liquids as directed. Ask how much liquid to drink each day and which liquids are best for you. Limit caffeine to less than 200 milligrams each day. Limit your intake of fish to 2 servings each week. Choose fish low in mercury such as canned light tuna, shrimp, salmon, cod, or tilapia. Do not  eat fish high in mercury such as swordfish, tilefish, man mackerel, and shark. Take prenatal vitamins as directed.   Your need for certain vitamins and minerals, such as folic acid, increases during pregnancy. Prenatal vitamins provide some of the extra vitamins and minerals you need. Prenatal vitamins may also help to decrease the risk of certain birth defects. Rest as needed. Put your feet up if you have swelling in your ankles and feet. Talk to your healthcare provider about exercise. Moderate exercise can help you stay fit. Your healthcare provider will help you plan an exercise program that is safe for you during pregnancy. Do not smoke. Smoking increases your risk of a miscarriage and other health problems during your pregnancy. Smoking can cause your baby to be born early or weigh less at birth. Ask your healthcare provider for information if you need help quitting. Do not drink alcohol. Alcohol passes from your body to your baby through the placenta. It can affect your baby's brain development and cause fetal alcohol syndrome (FAS). FAS is a group of conditions that causes mental, behavior, and growth problems. Talk to your healthcare provider before you take any medicines. Many medicines may harm your baby if you take them when you are pregnant. Do not take any medicines, vitamins, herbs, or supplements without first talking to your healthcare provider. Never use illegal or street drugs (such as marijuana or cocaine) while you are pregnant. Safety tips:   Avoid hot tubs and saunas. Do not use a hot tub or sauna while you are pregnant, especially during your first trimester. Hot tubs and saunas may raise your baby's temperature and increase the risk of birth defects. Avoid toxoplasmosis. This is an infection caused by eating raw meat or being around infected cat feces. It can cause birth defects, miscarriages, and other problems. Wash your hands after you touch raw meat. Make sure any meat is well-cooked before you eat it. Avoid raw eggs and unpasteurized milk.  Use gloves or ask someone else to clean your cat's litter box while you are pregnant. Ask your healthcare provider about travel. The most comfortable time to travel is during the second trimester. Ask your provider if you can travel after 36 weeks. You may not be able to travel in an airplane after 36 weeks. He or she may also recommend you avoid long road trips. Changes happening with your baby:  By 38 weeks, your baby may weigh between 6 and 9 pounds. Your baby may be about 14 inches long from the top of the head to the rump (baby's bottom). Your baby hears well enough to know your voice. As your baby gets larger, you may feel fewer kicks and more stretching and rolling. Your baby may move into a head-down position. Your baby will also rest lower in your abdomen. What you need to know about prenatal care: Your healthcare provider will check your blood pressure and weight. You may also need the following:  A urine test  may also be done to check for sugar and protein. These can be signs of gestational diabetes or infection. Protein in your urine may also be a sign of preeclampsia. Preeclampsia is a condition that can develop during week 20 or later of your pregnancy. It causes high blood pressure, and it can cause problems with your kidneys and other organs. A gestational diabetes screen  may be done. Your healthcare provider may order either a 1-step or 2-step oral glucose tolerance test (OGTT). 1-step OGTT:  Your blood sugar level will be tested after you have not eaten for 8 hours (fasting). You will then be given a glucose drink. Your level will be tested again 1 hour and 2 hours after you finish the drink. 2-step OGTT:  You do not have to fast for the first part of the test. You will have the glucose drink at any time of day. Your blood sugar level will be checked 1 hour later. If your blood sugar is higher than a certain level, another test will be ordered. You will fast and your blood sugar level will be tested. You will have the glucose drink.  Your blood will be tested again 1 hour, 2 hours, and 3 hours after you finish the glucose drink. A blood test  may be done to check for anemia (low iron level). A Tdap vaccine  may be recommended by your healthcare provider. A group B strep test  is a test that is done to check for group B strep infection. Group B strep is a type of bacteria that may be found in the vagina or rectum. It can be passed to your baby during delivery if you have it. Your healthcare provider will take swab your vagina or rectum and send the sample to the lab for tests. Fundal height  is a measurement of your uterus to check your baby's growth. This number is usually the same as the number of weeks that you have been pregnant. Your healthcare provider may also check your baby's position. Your baby's heart rate  will be checked. Follow up with your obstetrician as directed:  Write down your questions so you remember to ask them during your visits. © Copyright [employer] 2023 Information is for End User's use only and may not be sold, redistributed or otherwise used for commercial purposes. The above information is an  only. It is not intended as medical advice for individual conditions or treatments. Talk to your doctor, nurse or pharmacist before following any medical regimen to see if it is safe and effective for you.

## 2023-10-19 NOTE — PROGRESS NOTES
OB/GYN  PN Visit  Kaitlin Daniel  [id number]  10/19/2023  2:18 PM  Dr. Dian Koo MD    S: 29 y.o. Ty Mercury 35w2d here for PN visit. Chief Complaint   Patient presents with    Routine Prenatal Visit     Pt reports no concerns. NST         OB complaints:  Contractions: no  Leakage: no  Bleeding: no  Fetal movement: yes    Here for routine visit. NST performed due to history of PACs. O:  /80   Wt 83 kg (182 lb 14.4 oz)   LMP 2023 (Exact Date)   BMI 28.65 kg/m²       Review of Systems   Constitutional: Negative. HENT: Negative. Eyes: Negative. Respiratory: Negative. Cardiovascular: Negative. Gastrointestinal: Negative. Endocrine: Negative. Genitourinary:         As noted in HPI   Musculoskeletal: Negative. Skin: Negative. Allergic/Immunologic: Negative. Neurological: Negative. Hematological: Negative. Psychiatric/Behavioral: Negative. Physical Exam  Constitutional:       General: She is not in acute distress. Appearance: Normal appearance. She is well-developed. Abdominal:      Palpations: Abdomen is soft. Tenderness: There is no abdominal tenderness. There is no guarding. Neurological:      Mental Status: She is alert and oriented to person, place, and time. Skin:     General: Skin is warm and dry.    Psychiatric:         Behavior: Behavior normal.             Pregravid Weight/BMI: 73.5 kg (162 lb) (BMI 25.37)  Current Weight: 83 kg (182 lb 14.4 oz)   Total Weight Gain: 9.48 kg (20 lb 14.4 oz)   Pre- Vitals      Flowsheet Row Most Recent Value   Prenatal Assessment    Prenatal Vitals    Blood Pressure 124/80   Weight - Scale 83 kg (182 lb 14.4 oz)   Urine Albumin/Glucose    Dilation/Effacement/Station    Vaginal Drainage    Edema              Problem List          Digestive    Focal nodular hyperplasia of liver       Other    Seasonal allergies    SALLIE (generalized anxiety disorder)    Family history of thromboembolic disease Overview     Thrombophilia labs: negative         Family history of cardiovascular disease    Supervision of normal first pregnancy, antepartum    Overview       CF/ SMA testing: negative  Flu vaccine:  completed  Covid vaccine:  completed X 2           35 weeks gestation of pregnancy    Fetal arrhythmia affecting pregnancy, antepartum    Fetal heart rate/rhythm abnormality, antepartum     Other Visit Diagnoses       Third trimester pregnancy            NST is reactive. There is no evidence of contractions. There are no PACs. Follow-up with MFM tomorrow as planned. Follow-up next week for routine OB visit.       Future Appointments   Date Time Provider 4600  46[facility]   10/20/2023  3:15 PM  NURSE PAUL 2 BE 15[address] [facility]   10/20/2023  3:45 PM  US [address] [facility]   10/23/2023 11:00 AM  NURSE [address]   10/23/2023 11:30 AM  US 2 [address]   10/26/2023  8:00 AM Cookie Shoemaker MD Complete  [facility]   10/30/2023  9:15 AM  NURSE PAUL 2 BE 15[address] [facility]   11/3/2023  8:45 AM Cookie Shoemaker MD Complete [facility]   2023 12:15 PM  NURSE FOGJONI 2 BE 1501 [facility]   11/10/2023  8:15 AM Cookie Shoemaker MD Complete [facility]   2023  8:00 AM Cookie Shoemaker MD Complete [facility]   2023  8:45 AM  NURSE FOGJONI 2 BE 15[address] [facility]   2023 10:00 AM Cookie Shoemaker MD Complete  P[facility]               Cookie Shoemaker MD  10/19/2023  2:18 PM

## 2023-10-20 ENCOUNTER — ULTRASOUND (OUTPATIENT)
Dept: PERINATAL CARE | Facility: OTHER | Age: 28
End: 2023-10-20
Payer: COMMERCIAL

## 2023-10-20 ENCOUNTER — ROUTINE PRENATAL (OUTPATIENT)
Dept: PERINATAL CARE | Facility: OTHER | Age: 28
End: 2023-10-20
Payer: COMMERCIAL

## 2023-10-20 VITALS
WEIGHT: 182.98 LBS | DIASTOLIC BLOOD PRESSURE: 72 MMHG | HEART RATE: 94 BPM | HEIGHT: 67 IN | BODY MASS INDEX: 28.72 KG/M2 | SYSTOLIC BLOOD PRESSURE: 122 MMHG

## 2023-10-20 DIAGNOSIS — Z3A.35 35 WEEKS GESTATION OF PREGNANCY: ICD-10-CM

## 2023-10-20 DIAGNOSIS — O36.8390 FETAL ARRHYTHMIA AFFECTING PREGNANCY, ANTEPARTUM: Primary | ICD-10-CM

## 2023-10-20 DIAGNOSIS — Z36.89 ENCOUNTER FOR ULTRASOUND TO CHECK FETAL GROWTH: ICD-10-CM

## 2023-10-20 DIAGNOSIS — Z3A.35 35 WEEKS GESTATION OF PREGNANCY: Primary | ICD-10-CM

## 2023-10-20 PROCEDURE — 76818 FETAL BIOPHYS PROFILE W/NST: CPT | Performed by: OBSTETRICS & GYNECOLOGY

## 2023-10-20 PROCEDURE — 76816 OB US FOLLOW-UP PER FETUS: CPT | Performed by: OBSTETRICS & GYNECOLOGY

## 2023-10-20 NOTE — PROGRESS NOTES
Repeat Non-Stress Testing:    Patient verbalizes +FM. Pt denies ALL:               Leaking of fluid   Contractions   Vaginal bleeding   Decreased fetal movement    Patient is performing daily kick counts. Patient has no questions or concerns. NST strip reviewed by Dr. Marvin Simpson. Dr Marvin Simpson aware that no audible PAC's noted.

## 2023-10-20 NOTE — LETTER
Date: 10/20/2023    Giovanni Pyle MD  [address] [place] [address] Drive  [address] [place] [address]    Patient: Jon Farmer   YOB: 1995   Date of Visit: 10/20/2023   Gestational age 32w2d   Seth Macedo of this communication: Routine. Dear Calos Sims,    This patient was seen recently in our  office. Please see ultrasound report under "OB Procedures" tab. Please don't hesitate to contact our office with any concerns or questions.       Sincerely,      Lamar Soto MD  Attending Physician, [address] [place] [address]

## 2023-10-21 NOTE — PATIENT INSTRUCTIONS
Thank you for choosing us for your  care today. If you have any questions about your ultrasound or care, please do not hesitate to contact us or your primary obstetrician. Some general instructions for your pregnancy are:    Exercise: Aim for 22 minutes per day (150 minutes per week) of regular exercise. Walking is great! Nutrition: Choose healthy sources of calcium, iron, and protein. Learn about Preeclampsia: preeclampsia is a common, serious high blood pressure complication in pregnancy. A blood pressure of 365FTXO (systolic or top number) or 14ZWUM (diastolic or bottom number) is not normal and needs evaluation by your doctor. Aspirin is sometimes prescribed in early pregnancy to prevent preeclampsia in women with risk factors - ask your obstetrician if you should be on this medication. For more resources, visit:  MapCoverage.fi  If you smoke, try to reduce how many cigarettes you smoke or try to quit completely. Do not vape. Other warning signs to watch out for in pregnancy or postpartum: chest pain, obstructed breathing or shortness of breath, seizures, thoughts of hurting yourself or your baby, bleeding, a painful or swollen leg, fever, or headache (see [employer] POST-BIRTH Warning Signs campaign). If these happen call 911. Itching is also not normal in pregnancy and if you experience this, especially over your hands and feet, potentially worse at night, notify your doctors.

## 2023-10-21 NOTE — PROGRESS NOTES
[facility]: Ms. Chito Diego was seen today for NST (found under the pregnancy episode) which I reviewed the RN assessment and agree, and fetal growth ultrasound (see ultrasound report under OB procedures tab). Studies were interpreted remotely and she was counseled via [employer]. Please don't hesitate to contact our office with any concerns or questions.   Eyal Lopez MD

## 2023-10-23 ENCOUNTER — ROUTINE PRENATAL (OUTPATIENT)
Dept: PERINATAL CARE | Facility: OTHER | Age: 28
End: 2023-10-23
Payer: COMMERCIAL

## 2023-10-23 VITALS — WEIGHT: 185.3 LBS | HEIGHT: 67 IN | BODY MASS INDEX: 29.08 KG/M2

## 2023-10-23 VITALS
HEART RATE: 76 BPM | SYSTOLIC BLOOD PRESSURE: 118 MMHG | WEIGHT: 185 LBS | BODY MASS INDEX: 28.98 KG/M2 | DIASTOLIC BLOOD PRESSURE: 68 MMHG

## 2023-10-23 DIAGNOSIS — Z3A.35 35 WEEKS GESTATION OF PREGNANCY: Primary | ICD-10-CM

## 2023-10-23 DIAGNOSIS — O36.8390 FETAL ARRHYTHMIA AFFECTING PREGNANCY, ANTEPARTUM: Primary | ICD-10-CM

## 2023-10-23 DIAGNOSIS — O36.8390 FETAL ARRHYTHMIA AFFECTING PREGNANCY, ANTEPARTUM: ICD-10-CM

## 2023-10-23 DIAGNOSIS — Z3A.35 35 WEEKS GESTATION OF PREGNANCY: ICD-10-CM

## 2023-10-23 PROCEDURE — 59025 FETAL NON-STRESS TEST: CPT | Performed by: STUDENT IN AN ORGANIZED HEALTH CARE EDUCATION/TRAINING PROGRAM

## 2023-10-23 PROCEDURE — 76815 OB US LIMITED FETUS(S): CPT | Performed by: STUDENT IN AN ORGANIZED HEALTH CARE EDUCATION/TRAINING PROGRAM

## 2023-10-23 NOTE — PROGRESS NOTES
Repeat Non-Stress Testing:    Patient verbalizes +FM. Pt denies ALL:               Leaking of fluid   Contractions   Vaginal bleeding   Decreased fetal movement    Patient is performing daily kick counts. Patient has no questions or concerns. NST strip reviewed by Dr. Maria Del Carmen Whitley.

## 2023-10-23 NOTE — PROGRESS NOTES
[facility]: Ms. Judy Manzanares was seen today for NST (found under the pregnancy episode) which I reviewed the RN assessment and agree, and JENNIFER (see ultrasound report under OB procedures tab). Please don't hesitate to contact our office with any concerns or questions.   -Hermelindo Esteves MD

## 2023-10-25 PROBLEM — Z3A.36 36 WEEKS GESTATION OF PREGNANCY: Status: ACTIVE | Noted: 2023-04-07

## 2023-10-25 NOTE — PROGRESS NOTES
OB/GYN  PN Visit  Algie Cogan  [id number]  10/26/2023  8:11 AM  Dr. Jonn Vuong MD    S: 29 y.o. Ken Saxena 36w2d here for PN visit. Chief Complaint   Patient presents with    Routine Prenatal Visit         OB complaints:  Contractions: no  Leakage: no  Bleeding: no  Fetal movement: yes      O:  /62 (BP Location: Left arm, Patient Position: Sitting, Cuff Size: Adult)   Pulse 79   Ht 5' 7" (1.702 m)   Wt 84.4 kg (186 lb)   LMP 02/14/2023 (Exact Date)   BMI 29.13 kg/m²       Review of Systems   Constitutional: Negative. HENT: Negative. Eyes: Negative. Respiratory: Negative. Cardiovascular: Negative. Gastrointestinal: Negative. Endocrine: Negative. Genitourinary:         As noted in HPI   Musculoskeletal: Negative. Skin: Negative. Allergic/Immunologic: Negative. Neurological: Negative. Hematological: Negative. Psychiatric/Behavioral: Negative. Physical Exam  Constitutional:       General: She is not in acute distress. Appearance: Normal appearance. She is well-developed. Genitourinary:      Right Labia: No rash, tenderness, lesions, skin changes or Bartholin's cyst.     Left Labia: No tenderness, lesions, skin changes, Bartholin's cyst or rash. No labial fusion noted. No inguinal adenopathy present in the right or left side. Pelvic exam was performed with patient in the lithotomy position. HENT:      Head: Normocephalic. Cardiovascular:      Rate and Rhythm: Normal rate. Pulmonary:      Effort: Pulmonary effort is normal.   Abdominal:      General: There is no distension. Palpations: Abdomen is soft. Tenderness: There is no abdominal tenderness. There is no guarding. Hernia: There is no hernia in the left inguinal area or right inguinal area. Musculoskeletal:         General: No swelling or deformity. Lymphadenopathy:      Lower Body: No right inguinal adenopathy. No left inguinal adenopathy.    Neurological: General: No focal deficit present. Mental Status: She is alert and oriented to person, place, and time. Skin:     General: Skin is warm and dry. Psychiatric:         Mood and Affect: Mood normal.         Behavior: Behavior normal.   Vitals reviewed.              Pregravid Weight/BMI: 73.5 kg (162 lb) (BMI 25.37)  Current Weight: 84.4 kg (186 lb)   Total Weight Gain: 10.9 kg (24 lb)   Pre- Vitals      Flowsheet Row Most Recent Value   Prenatal Assessment    Fetal Heart Rate 155   Fundal Height (cm) 35 cm   Movement Present   Presentation Vertex   Prenatal Vitals    Blood Pressure 100/62   Weight - Scale 84.4 kg (186 lb)   Urine Albumin/Glucose    Dilation/Effacement/Station    Cervical Dilation 0   Cervical Effacement 50   Fetal Station -2   Vaginal Drainage    Edema              Problem List          Digestive    Focal nodular hyperplasia of liver       Other    Seasonal allergies    SALLIE (generalized anxiety disorder)    Family history of thromboembolic disease    Overview     Thrombophilia labs: negative         Family history of cardiovascular disease    Supervision of normal first pregnancy, antepartum    Overview       CF/ SMA testing: negative  Flu vaccine:  completed  Covid vaccine:  completed X 2           36 weeks gestation of pregnancy    Fetal arrhythmia affecting pregnancy, antepartum    Fetal heart rate/rhythm abnormality, antepartum     Other Visit Diagnoses       Third trimester pregnancy    -  Primary    Encounter for  screening for Streptococcus B               GBS collected  No PCN allergy    Follow-up in 1 week  Continue weekly testing with M    Future Appointments   Date Time Provider [address] [place] [address]   10/30/2023  9:15 AM [address] [place] [address]   11/3/2023  8:45 AM MD Belen Robbins [facility]   2023 12:15 PM [address] [place] [address]   11/10/2023  8:15 AM Keyur Lange MD Complete  [facility]   2023  8:00 AM Meg Baer MD Complete  [facility]   2023  8:45 AM  NURSE [place] 2  [facility] [facility]   2023 10:00 AM Meg Baer MD Complete [facility]               Meg Baer MD  10/26/2023  8:11 AM

## 2023-10-25 NOTE — PATIENT INSTRUCTIONS
Pregnancy at 28 to [address] [facility]:   You are considered full term at the beginning of 37 weeks. Your breathing may be easier if your baby has moved down into a head-down position. You may need to urinate more often because the baby may be pressing on your bladder. You may also feel more discomfort and get tired easily. DISCHARGE INSTRUCTIONS:   Return to the emergency department if:   You develop a severe headache that does not go away. You have new or increased vision changes, such as blurred or spotted vision. You have new or increased swelling in your face or hands. You have vaginal spotting or bleeding. Your water broke or you feel warm water gushing or trickling from your vagina. Call your obstetrician if:   You have more than 5 contractions in 1 hour. You notice any changes in your baby's movements. You have abdominal cramps, pressure, or tightening. You have a change in vaginal discharge. You have chills or a fever. You have vaginal itching, burning, or pain. You have yellow, green, white, or foul-smelling vaginal discharge. You have pain or burning when you urinate, less urine than usual, or pink or bloody urine. You have questions or concerns about your condition or care. How to care for yourself at this stage of your pregnancy:       Eat a variety of healthy foods. Healthy foods include fruits, vegetables, whole-grain breads, low-fat dairy foods, beans, lean meats, and fish. Drink liquids as directed. Ask how much liquid to drink each day and which liquids are best for you. Limit caffeine to less than 200 milligrams each day. Limit your intake of fish to 2 servings each week. Choose fish low in mercury such as canned light tuna, shrimp, salmon, cod, or tilapia. Do not  eat fish high in mercury such as swordfish, tilefish, man mackerel, and shark. Take prenatal vitamins as directed.   Your need for certain vitamins and minerals, such as folic acid, increases during pregnancy. Prenatal vitamins provide some of the extra vitamins and minerals you need. Prenatal vitamins may also help to decrease the risk of certain birth defects. Rest as needed. Put your feet up if you have swelling in your ankles and feet. Talk to your healthcare provider about exercise. Moderate exercise can help you stay fit. Your healthcare provider will help you plan an exercise program that is safe for you during pregnancy. Do not smoke. Smoking increases your risk of a miscarriage and other health problems during your pregnancy. Smoking can cause your baby to be born early or weigh less at birth. Ask your healthcare provider for information if you need help quitting. Do not drink alcohol. Alcohol passes from your body to your baby through the placenta. It can affect your baby's brain development and cause fetal alcohol syndrome (FAS). FAS is a group of conditions that causes mental, behavior, and growth problems. Talk to your healthcare provider before you take any medicines. Many medicines may harm your baby if you take them when you are pregnant. Do not take any medicines, vitamins, herbs, or supplements without first talking to your healthcare provider. Never use illegal or street drugs (such as marijuana or cocaine) while you are pregnant. Safety tips:   Avoid hot tubs and saunas. Do not use a hot tub or sauna while you are pregnant, especially during your first trimester. Hot tubs and saunas may raise your baby's temperature and increase the risk of birth defects. Avoid toxoplasmosis. This is an infection caused by eating raw meat or being around infected cat feces. It can cause birth defects, miscarriages, and other problems. Wash your hands after you touch raw meat. Make sure any meat is well-cooked before you eat it. Avoid raw eggs and unpasteurized milk.  Use gloves or ask someone else to clean your cat's litter box while you are pregnant. Ask your healthcare provider about travel. The most comfortable time to travel is during the second trimester. Ask your provider if you can travel after 36 weeks. You may not be able to travel in an airplane after 36 weeks. He or she may also recommend you avoid long road trips. Changes happening with your baby:  By 38 weeks, your baby may weigh between 6 and 9 pounds. Your baby may be about 14 inches long from the top of the head to the rump (baby's bottom). Your baby hears well enough to know your voice. As your baby gets larger, you may feel fewer kicks and more stretching and rolling. Your baby may move into a head-down position. Your baby will also rest lower in your abdomen. What you need to know about prenatal care: Your healthcare provider will check your blood pressure and weight. You may also need the following:  A urine test  may also be done to check for sugar and protein. These can be signs of gestational diabetes or infection. Protein in your urine may also be a sign of preeclampsia. Preeclampsia is a condition that can develop during week 20 or later of your pregnancy. It causes high blood pressure, and it can cause problems with your kidneys and other organs. A gestational diabetes screen  may be done. Your healthcare provider may order either a 1-step or 2-step oral glucose tolerance test (OGTT). 1-step OGTT:  Your blood sugar level will be tested after you have not eaten for 8 hours (fasting). You will then be given a glucose drink. Your level will be tested again 1 hour and 2 hours after you finish the drink. 2-step OGTT:  You do not have to fast for the first part of the test. You will have the glucose drink at any time of day. Your blood sugar level will be checked 1 hour later. If your blood sugar is higher than a certain level, another test will be ordered. You will fast and your blood sugar level will be tested. You will have the glucose drink.  Your blood will be tested again 1 hour, 2 hours, and 3 hours after you finish the glucose drink. A blood test  may be done to check for anemia (low iron level). A Tdap vaccine  may be recommended by your healthcare provider. A group B strep test  is a test that is done to check for group B strep infection. Group B strep is a type of bacteria that may be found in the vagina or rectum. It can be passed to your baby during delivery if you have it. Your healthcare provider will take swab your vagina or rectum and send the sample to the lab for tests. Fundal height  is a measurement of your uterus to check your baby's growth. This number is usually the same as the number of weeks that you have been pregnant. Your healthcare provider may also check your baby's position. Your baby's heart rate  will be checked. Follow up with your obstetrician as directed:  Write down your questions so you remember to ask them during your visits. © Copyright Loletta Gosselin 2023 Information is for End User's use only and may not be sold, redistributed or otherwise used for commercial purposes. The above information is an  only. It is not intended as medical advice for individual conditions or treatments. Talk to your doctor, nurse or pharmacist before following any medical regimen to see if it is safe and effective for you.

## 2023-10-26 ENCOUNTER — ROUTINE PRENATAL (OUTPATIENT)
Dept: OBGYN CLINIC | Facility: CLINIC | Age: 28
End: 2023-10-26

## 2023-10-26 VITALS
BODY MASS INDEX: 29.19 KG/M2 | DIASTOLIC BLOOD PRESSURE: 62 MMHG | SYSTOLIC BLOOD PRESSURE: 100 MMHG | HEIGHT: 67 IN | HEART RATE: 79 BPM | WEIGHT: 186 LBS

## 2023-10-26 DIAGNOSIS — Z36.85 ENCOUNTER FOR ANTENATAL SCREENING FOR STREPTOCOCCUS B: ICD-10-CM

## 2023-10-26 DIAGNOSIS — O36.8390 FETAL ARRHYTHMIA AFFECTING PREGNANCY, ANTEPARTUM: ICD-10-CM

## 2023-10-26 DIAGNOSIS — Z34.00 SUPERVISION OF NORMAL FIRST PREGNANCY, ANTEPARTUM: ICD-10-CM

## 2023-10-26 DIAGNOSIS — Z34.93 THIRD TRIMESTER PREGNANCY: Primary | ICD-10-CM

## 2023-10-26 DIAGNOSIS — Z3A.36 36 WEEKS GESTATION OF PREGNANCY: ICD-10-CM

## 2023-10-26 PROCEDURE — PNV: Performed by: OBSTETRICS & GYNECOLOGY

## 2023-10-26 PROCEDURE — 87150 DNA/RNA AMPLIFIED PROBE: CPT | Performed by: OBSTETRICS & GYNECOLOGY

## 2023-10-28 LAB — GP B STREP DNA SPEC QL NAA+PROBE: NEGATIVE

## 2023-10-28 NOTE — PROGRESS NOTES
Please refer to the [facility] ultrasound report in Ob Procedures for additional information regarding today's visit

## 2023-10-30 ENCOUNTER — ULTRASOUND (OUTPATIENT)
Dept: PERINATAL CARE | Facility: OTHER | Age: 28
End: 2023-10-30
Payer: COMMERCIAL

## 2023-10-30 VITALS
WEIGHT: 186.2 LBS | SYSTOLIC BLOOD PRESSURE: 122 MMHG | BODY MASS INDEX: 29.22 KG/M2 | DIASTOLIC BLOOD PRESSURE: 66 MMHG | HEART RATE: 90 BPM | HEIGHT: 67 IN

## 2023-10-30 DIAGNOSIS — Z3A.36 36 WEEKS GESTATION OF PREGNANCY: Primary | ICD-10-CM

## 2023-10-30 DIAGNOSIS — O36.8390 FETAL ARRHYTHMIA AFFECTING PREGNANCY, ANTEPARTUM: ICD-10-CM

## 2023-10-30 PROCEDURE — 76815 OB US LIMITED FETUS(S): CPT | Performed by: OBSTETRICS & GYNECOLOGY

## 2023-10-30 PROCEDURE — 59025 FETAL NON-STRESS TEST: CPT | Performed by: OBSTETRICS & GYNECOLOGY

## 2023-10-30 NOTE — LETTER
October 30, 2023     Edis Mcwilliams, [address] [address]    Patient: Keith Saenz   YOB: 1995   Date of Visit: 10/30/2023       Dear Dr. Alfredo Mins: Thank you for referring Keith Saenz to me for evaluation. Below are my notes for this consultation. If you have questions, please do not hesitate to call me. I look forward to following your patient along with you.          Sincerely,        Jet Huff MD        CC: No Recipients    Jet Huff MD  10/30/2023 10:06 AM  Sign when Signing Visit  Please refer to the [facility] ultrasound report in Ob Procedures for additional information regarding today's visit

## 2023-10-30 NOTE — PROGRESS NOTES
Repeat Non-Stress Testing:    Patient verbalizes +FM. Pt denies ALL:               Leaking of fluid   Contractions   Vaginal bleeding   Decreased fetal movement    Patient is performing daily kick counts. Patient has no questions or concerns. NST strip reviewed by Dr. Ruth Lee.

## 2023-11-03 ENCOUNTER — ROUTINE PRENATAL (OUTPATIENT)
Dept: OBGYN CLINIC | Facility: CLINIC | Age: 28
End: 2023-11-03

## 2023-11-03 ENCOUNTER — HOSPITAL ENCOUNTER (OUTPATIENT)
Facility: HOSPITAL | Age: 28
Discharge: HOME | End: 2023-11-03
Attending: STUDENT IN AN ORGANIZED HEALTH CARE EDUCATION/TRAINING PROGRAM | Admitting: STUDENT IN AN ORGANIZED HEALTH CARE EDUCATION/TRAINING PROGRAM
Payer: COMMERCIAL

## 2023-11-03 VITALS
HEART RATE: 67 BPM | TEMPERATURE: 98 F | DIASTOLIC BLOOD PRESSURE: 75 MMHG | RESPIRATION RATE: 18 BRPM | SYSTOLIC BLOOD PRESSURE: 124 MMHG

## 2023-11-03 VITALS
DIASTOLIC BLOOD PRESSURE: 80 MMHG | HEIGHT: 67 IN | SYSTOLIC BLOOD PRESSURE: 120 MMHG | BODY MASS INDEX: 29.4 KG/M2 | HEART RATE: 85 BPM | WEIGHT: 187.3 LBS

## 2023-11-03 DIAGNOSIS — Z3A.37 37 WEEKS GESTATION OF PREGNANCY: ICD-10-CM

## 2023-11-03 DIAGNOSIS — Z34.00 SUPERVISION OF NORMAL FIRST PREGNANCY, ANTEPARTUM: ICD-10-CM

## 2023-11-03 DIAGNOSIS — Z34.93 THIRD TRIMESTER PREGNANCY: Primary | ICD-10-CM

## 2023-11-03 DIAGNOSIS — O36.8131 DECREASED FETAL MOVEMENT AFFECTING MANAGEMENT OF PREGNANCY IN THIRD TRIMESTER, FETUS 1: ICD-10-CM

## 2023-11-03 DIAGNOSIS — O36.8390 FETAL HEART RATE/RHYTHM ABNORMALITY, ANTEPARTUM: ICD-10-CM

## 2023-11-03 PROBLEM — O28.8 NST (NON-STRESS TEST) NONREACTIVE: Status: ACTIVE | Noted: 2023-11-03

## 2023-11-03 PROCEDURE — NC001 PR NO CHARGE: Performed by: STUDENT IN AN ORGANIZED HEALTH CARE EDUCATION/TRAINING PROGRAM

## 2023-11-03 PROCEDURE — 99212 OFFICE O/P EST SF 10 MIN: CPT

## 2023-11-03 NOTE — PROGRESS NOTES
L&D Triage Note - OB/GYN  Jon Farmer 29 y.o. female MRN: [id number]  Unit/Bed#: L&D 327-01 Encounter: [id number]      ASSESSMENT/PLAN  Jon Farmer is a 29 y.o.  at 37w3d here for extended monitoring after a nonreactive NST in the office      1) Extended monitoring  -Patient is status post 2 hours of extended monitoring at labor and delivery with reactive NST and no decelerations        2)  Discharge instructions  - Patient instructed to call if experiencing worsening contractions, vaginal bleeding, loss of fluid or decreased fetal movement. - Will follow up with OBGYN in office      She is a patient of [facility] (Dr. Bill Carrington)  D/w Dr. Megan Renee , on call OBGYN Attending Physician  ______________    SUBJECTIVE    EDITA: Estimated Date of Delivery: 23    HPI:  29 y.o. Taurus Barrera 37w3d presents with complaint of being sent from the office for Variable decelerations noted on NST - sent to L and D for extended monitoring  Scheduled for testing with MFM next week    Contractions: no  Leakage of fluid: no  Vaginal Bleeding: no  Fetal movement: present    Her obstetrical history is significant for family history of thromboembolic disease, family history of cardiovascular disease. Fetal arrhythmia affecting pregnancy, generalized anxiety disorder, focal nodular hyperplasia of the liver, seasonal allergies    ROS:  Constitutional: Negative  Respiratory: Negative  Cardiovascular: Negative    Gastrointestinal: Negative    Physical Exam  General: Well appearing, no distress  Respiratory: Unlabored breathing  Cardiovascular: Regular rate  Abdomen: Soft, gravid, nontender   Fundal Height: Appropriate for gestational age. Extremities: Warm and well perfused. Non tender. OBJECTIVE:  /75 (BP Location: Left arm)   Pulse 67   Temp 98 °F (36.7 °C) (Oral)   Resp 18   LMP 2023 (Exact Date)   There is no height or weight on file to calculate BMI.   Labs: No results found for this or any previous visit (from the past 24 hour(s)). SVE:   Declined by patient    FHT:   Reactive NST    TOCO:    No ctx on Mary Bio, DO  OB/GYN   11/3/2023  12:02 PM      Portions of the record may have been created with voice recognition software. Occasional wrong word or "sound a like" substitutions may have occurred due to the inherent limitations of voice recognition software.   Read the chart carefully and recognize, using context, where substitutions have occurred

## 2023-11-03 NOTE — PROGRESS NOTES
OB/GYN  PN Visit  Hayes Keller  [id number]  11/3/2023  9:26 AM  Dr. Nohemi Maddox MD    S: 29 y.o.  37w3d here for PN visit. Chief Complaint   Patient presents with    Routine Prenatal Visit         OB complaints:  Contractions: no  Leakage: no  Bleeding: no  Fetal movement: yes - still 10 movements in 2 hours but slower than baseline        O:  /80 (BP Location: Right arm, Patient Position: Sitting, Cuff Size: Adult)   Pulse 85   Ht 5' 7" (1.702 m)   Wt 85 kg (187 lb 4.8 oz)   LMP 2023 (Exact Date)   BMI 29.34 kg/m²       Review of Systems   Constitutional: Negative. HENT: Negative. Eyes: Negative. Respiratory: Negative. Cardiovascular: Negative. Gastrointestinal: Negative. Endocrine: Negative. Genitourinary:         As noted in HPI   Musculoskeletal: Negative. Skin: Negative. Allergic/Immunologic: Negative. Neurological: Negative. Hematological: Negative. Psychiatric/Behavioral: Negative. Physical Exam  Constitutional:       General: She is not in acute distress. Appearance: Normal appearance. She is well-developed. Genitourinary:      Right Labia: No rash, tenderness, lesions, skin changes or Bartholin's cyst.     Left Labia: No tenderness, lesions, skin changes, Bartholin's cyst or rash. No labial fusion noted. No inguinal adenopathy present in the right or left side. Pelvic exam was performed with patient in the lithotomy position. HENT:      Head: Normocephalic. Cardiovascular:      Rate and Rhythm: Normal rate. Pulmonary:      Effort: Pulmonary effort is normal.   Abdominal:      General: There is no distension. Palpations: Abdomen is soft. Tenderness: There is no abdominal tenderness. There is no guarding. Hernia: There is no hernia in the left inguinal area or right inguinal area. Musculoskeletal:         General: No swelling or deformity.    Lymphadenopathy:      Lower Body: No right inguinal adenopathy. No left inguinal adenopathy. Neurological:      General: No focal deficit present. Mental Status: She is alert and oriented to person, place, and time. Skin:     General: Skin is warm and dry. Psychiatric:         Mood and Affect: Mood normal.         Behavior: Behavior normal.   Vitals reviewed. Pregravid Weight/BMI: 73.5 kg (162 lb) (BMI 25.37)  Current Weight: 85 kg (187 lb 4.8 oz)   Total Weight Gain: 11.5 kg (25 lb 4.8 oz)   Pre- Vitals      Flowsheet Row Most Recent Value   Prenatal Assessment    Fetal Heart Rate 156   Fundal Height (cm) 36 cm   Movement Present   Presentation Vertex   Prenatal Vitals    Blood Pressure 120/80   Weight - Scale 85 kg (187 lb 4.8 oz)   Urine Albumin/Glucose    Dilation/Effacement/Station    Cervical Dilation . 5   Cervical Effacement 50   Fetal Station -2   Vaginal Drainage    Edema              Problem List          Digestive    Focal nodular hyperplasia of liver       Other    Seasonal allergies    SALLIE (generalized anxiety disorder)    Family history of thromboembolic disease    Overview     Thrombophilia labs: negative         Family history of cardiovascular disease    Supervision of normal first pregnancy, antepartum    Overview       CF/ SMA testing: negative  Flu vaccine:  completed  Covid vaccine:  completed X 2           37 weeks gestation of pregnancy    Fetal arrhythmia affecting pregnancy, antepartum    Fetal heart rate/rhythm abnormality, antepartum     Other Visit Diagnoses       Third trimester pregnancy    -  Primary    Decreased fetal movement affecting management of pregnancy in third trimester, fetus 1               NST and JENNIFER performed due to decreased FM, still normal but decreased from baseline  Variable decelerations noted on NST - sent to L and D for extended monitoring  Scheduled for testing with MFM next week  Labor precautions  Will perform another NST at next visit      Future Appointments   Date Time Provider [address]   2023 12:15 PM  NURSE PAUL Gotti   11/10/2023  8:15 AM Missy Mcfadden MD Complete  P[facility]   2023  8:00 AM Missy Mcfadden MD Complete  [facility]   2023  8:45 AM  NURSE PAUL 2 BE [facility] [facility]   2023 10:00 AM Missy Mcfadden MD Complete [facility]               Missy Mcfadden MD  11/3/2023  9:26 AM

## 2023-11-03 NOTE — PATIENT INSTRUCTIONS
Pregnancy at 28 to [address] [facility]:   You are considered full term at the beginning of 37 weeks. Your breathing may be easier if your baby has moved down into a head-down position. You may need to urinate more often because the baby may be pressing on your bladder. You may also feel more discomfort and get tired easily. DISCHARGE INSTRUCTIONS:   Return to the emergency department if:   You develop a severe headache that does not go away. You have new or increased vision changes, such as blurred or spotted vision. You have new or increased swelling in your face or hands. You have vaginal spotting or bleeding. Your water broke or you feel warm water gushing or trickling from your vagina. Call your obstetrician if:   You have more than 5 contractions in 1 hour. You notice any changes in your baby's movements. You have abdominal cramps, pressure, or tightening. You have a change in vaginal discharge. You have chills or a fever. You have vaginal itching, burning, or pain. You have yellow, green, white, or foul-smelling vaginal discharge. You have pain or burning when you urinate, less urine than usual, or pink or bloody urine. You have questions or concerns about your condition or care. How to care for yourself at this stage of your pregnancy:       Eat a variety of healthy foods. Healthy foods include fruits, vegetables, whole-grain breads, low-fat dairy foods, beans, lean meats, and fish. Drink liquids as directed. Ask how much liquid to drink each day and which liquids are best for you. Limit caffeine to less than 200 milligrams each day. Limit your intake of fish to 2 servings each week. Choose fish low in mercury such as canned light tuna, shrimp, salmon, cod, or tilapia. Do not  eat fish high in mercury such as swordfish, tilefish, man mackerel, and shark. Take prenatal vitamins as directed.   Your need for certain vitamins and minerals, such as folic acid, increases during pregnancy. Prenatal vitamins provide some of the extra vitamins and minerals you need. Prenatal vitamins may also help to decrease the risk of certain birth defects. Rest as needed. Put your feet up if you have swelling in your ankles and feet. Talk to your healthcare provider about exercise. Moderate exercise can help you stay fit. Your healthcare provider will help you plan an exercise program that is safe for you during pregnancy. Do not smoke. Smoking increases your risk of a miscarriage and other health problems during your pregnancy. Smoking can cause your baby to be born early or weigh less at birth. Ask your healthcare provider for information if you need help quitting. Do not drink alcohol. Alcohol passes from your body to your baby through the placenta. It can affect your baby's brain development and cause fetal alcohol syndrome (FAS). FAS is a group of conditions that causes mental, behavior, and growth problems. Talk to your healthcare provider before you take any medicines. Many medicines may harm your baby if you take them when you are pregnant. Do not take any medicines, vitamins, herbs, or supplements without first talking to your healthcare provider. Never use illegal or street drugs (such as marijuana or cocaine) while you are pregnant. Safety tips:   Avoid hot tubs and saunas. Do not use a hot tub or sauna while you are pregnant, especially during your first trimester. Hot tubs and saunas may raise your baby's temperature and increase the risk of birth defects. Avoid toxoplasmosis. This is an infection caused by eating raw meat or being around infected cat feces. It can cause birth defects, miscarriages, and other problems. Wash your hands after you touch raw meat. Make sure any meat is well-cooked before you eat it. Avoid raw eggs and unpasteurized milk.  Use gloves or ask someone else to clean your cat's litter box while you are pregnant. Ask your healthcare provider about travel. The most comfortable time to travel is during the second trimester. Ask your provider if you can travel after 36 weeks. You may not be able to travel in an airplane after 36 weeks. He or she may also recommend you avoid long road trips. Changes happening with your baby:  By 38 weeks, your baby may weigh between 6 and 9 pounds. Your baby may be about 14 inches long from the top of the head to the rump (baby's bottom). Your baby hears well enough to know your voice. As your baby gets larger, you may feel fewer kicks and more stretching and rolling. Your baby may move into a head-down position. Your baby will also rest lower in your abdomen. What you need to know about prenatal care: Your healthcare provider will check your blood pressure and weight. You may also need the following:  A urine test  may also be done to check for sugar and protein. These can be signs of gestational diabetes or infection. Protein in your urine may also be a sign of preeclampsia. Preeclampsia is a condition that can develop during week 20 or later of your pregnancy. It causes high blood pressure, and it can cause problems with your kidneys and other organs. A gestational diabetes screen  may be done. Your healthcare provider may order either a 1-step or 2-step oral glucose tolerance test (OGTT). 1-step OGTT:  Your blood sugar level will be tested after you have not eaten for 8 hours (fasting). You will then be given a glucose drink. Your level will be tested again 1 hour and 2 hours after you finish the drink. 2-step OGTT:  You do not have to fast for the first part of the test. You will have the glucose drink at any time of day. Your blood sugar level will be checked 1 hour later. If your blood sugar is higher than a certain level, another test will be ordered. You will fast and your blood sugar level will be tested. You will have the glucose drink.  Your blood will be tested again 1 hour, 2 hours, and 3 hours after you finish the glucose drink. A blood test  may be done to check for anemia (low iron level). A Tdap vaccine  may be recommended by your healthcare provider. A group B strep test  is a test that is done to check for group B strep infection. Group B strep is a type of bacteria that may be found in the vagina or rectum. It can be passed to your baby during delivery if you have it. Your healthcare provider will take swab your vagina or rectum and send the sample to the lab for tests. Fundal height  is a measurement of your uterus to check your baby's growth. This number is usually the same as the number of weeks that you have been pregnant. Your healthcare provider may also check your baby's position. Your baby's heart rate  will be checked. Follow up with your obstetrician as directed:  Write down your questions so you remember to ask them during your visits. © Copyright [facility] 2023 Information is for End User's use only and may not be sold, redistributed or otherwise used for commercial purposes. The above information is an  only. It is not intended as medical advice for individual conditions or treatments. Talk to your doctor, nurse or pharmacist before following any medical regimen to see if it is safe and effective for you.

## 2023-11-03 NOTE — PROGRESS NOTES
Patient is s/p extended monitoring which was reactive with no decelerations noted. She declines a cervical check at this time because she was checked earlier in the office. Discharge instructions reviewed. Follow up as scheduled outpatient for routine care. Discussed with Dr. Cholo Johnson. Denese C. Charyl Dancer, MD  PGY-IV, OB/GYN  11/3/2023, 1:25 PM

## 2023-11-06 ENCOUNTER — ULTRASOUND (OUTPATIENT)
Dept: PERINATAL CARE | Facility: OTHER | Age: 28
End: 2023-11-06
Payer: COMMERCIAL

## 2023-11-06 VITALS
BODY MASS INDEX: 29.54 KG/M2 | HEART RATE: 82 BPM | DIASTOLIC BLOOD PRESSURE: 68 MMHG | WEIGHT: 188.2 LBS | HEIGHT: 67 IN | SYSTOLIC BLOOD PRESSURE: 118 MMHG

## 2023-11-06 DIAGNOSIS — O36.8390 FETAL HEART RATE/RHYTHM ABNORMALITY, ANTEPARTUM: Primary | ICD-10-CM

## 2023-11-06 DIAGNOSIS — Z3A.37 37 WEEKS GESTATION OF PREGNANCY: ICD-10-CM

## 2023-11-06 PROCEDURE — 76815 OB US LIMITED FETUS(S): CPT | Performed by: OBSTETRICS & GYNECOLOGY

## 2023-11-06 PROCEDURE — 59025 FETAL NON-STRESS TEST: CPT | Performed by: OBSTETRICS & GYNECOLOGY

## 2023-11-06 NOTE — LETTER
November 6, 2023     Jonn Vuong, [address]   [address]    Patient: Algie Cogan   YOB: 1995   Date of Visit: 11/6/2023       Dear Dr. Yi Hickman: Thank you for referring Algie Cogan to me for evaluation. Below are my notes for this consultation. If you have questions, please do not hesitate to call me. I look forward to following your patient along with you. Sincerely,        Loc Flores MD        CC: No Recipients    Loc Flores MD  11/6/2023 11:37 PM  Sign when Signing Visit  NST reactive with multiple accelerations. Baseline 125. US tech reports rare PAC seen but unable to capture on mmode or cini today.      Will continue weekly nst/srinivas.  Loc Flores

## 2023-11-06 NOTE — PROGRESS NOTES
Repeat Non-Stress Testing:    Patient verbalizes +FM. Pt denies ALL:               Leaking of fluid   Contractions   Vaginal bleeding   Decreased fetal movement    Patient is performing daily kick counts. Patient has no questions or concerns. NST strip reviewed by Dr. Claudia Pineda via tiger Text Video.

## 2023-11-07 NOTE — PROGRESS NOTES
NST reactive with multiple accelerations. Baseline 125. [facility] reports rare PAC seen but unable to capture on mmode or cini today.      Will continue weekly nst/srinivas.  Garrett Jones

## 2023-11-10 ENCOUNTER — ROUTINE PRENATAL (OUTPATIENT)
Dept: OBGYN CLINIC | Facility: CLINIC | Age: 28
End: 2023-11-10
Payer: COMMERCIAL

## 2023-11-10 VITALS
SYSTOLIC BLOOD PRESSURE: 130 MMHG | DIASTOLIC BLOOD PRESSURE: 86 MMHG | WEIGHT: 188.2 LBS | HEART RATE: 81 BPM | HEIGHT: 67 IN | BODY MASS INDEX: 29.54 KG/M2

## 2023-11-10 DIAGNOSIS — Z3A.38 38 WEEKS GESTATION OF PREGNANCY: ICD-10-CM

## 2023-11-10 DIAGNOSIS — Z34.93 THIRD TRIMESTER PREGNANCY: Primary | ICD-10-CM

## 2023-11-10 DIAGNOSIS — O36.8390 FETAL ARRHYTHMIA AFFECTING PREGNANCY, ANTEPARTUM: ICD-10-CM

## 2023-11-10 DIAGNOSIS — Z34.00 SUPERVISION OF NORMAL FIRST PREGNANCY, ANTEPARTUM: ICD-10-CM

## 2023-11-10 PROCEDURE — 59025 FETAL NON-STRESS TEST: CPT | Performed by: OBSTETRICS & GYNECOLOGY

## 2023-11-10 PROCEDURE — PNV: Performed by: OBSTETRICS & GYNECOLOGY

## 2023-11-10 NOTE — PROGRESS NOTES
OB/GYN  PN Visit  Tam Velzaco  [id number]  11/10/2023  9:19 AM  Dr. Chuy Manriquez MD    S: 29 y.o. Yoli Noble 38w3d here for PN visit. Chief Complaint   Patient presents with    Routine Prenatal Visit         OB complaints:  Contractions: no  Leakage: no  Bleeding: no  Fetal movement: yes      O:  /86 (BP Location: Right arm, Patient Position: Sitting, Cuff Size: Adult)   Pulse 81   Ht 5' 7" (1.702 m)   Wt 85.4 kg (188 lb 3.2 oz)   LMP 02/14/2023 (Exact Date)   BMI 29.48 kg/m²       Review of Systems   Constitutional: Negative. HENT: Negative. Eyes: Negative. Respiratory: Negative. Cardiovascular: Negative. Gastrointestinal: Negative. Endocrine: Negative. Genitourinary:         As noted in HPI   Musculoskeletal: Negative. Skin: Negative. Allergic/Immunologic: Negative. Neurological: Negative. Hematological: Negative. Psychiatric/Behavioral: Negative. Physical Exam  Constitutional:       General: She is not in acute distress. Appearance: Normal appearance. She is well-developed. Genitourinary:      Right Labia: No rash, tenderness, lesions, skin changes or Bartholin's cyst.     Left Labia: No tenderness, lesions, skin changes, Bartholin's cyst or rash. No labial fusion noted. No inguinal adenopathy present in the right or left side. Pelvic exam was performed with patient in the lithotomy position. HENT:      Head: Normocephalic. Cardiovascular:      Rate and Rhythm: Normal rate. Pulmonary:      Effort: Pulmonary effort is normal.   Abdominal:      General: There is no distension. Palpations: Abdomen is soft. Tenderness: There is no abdominal tenderness. There is no guarding. Hernia: There is no hernia in the left inguinal area or right inguinal area. Musculoskeletal:         General: No swelling or deformity. Lymphadenopathy:      Lower Body: No right inguinal adenopathy. No left inguinal adenopathy. Neurological:      General: No focal deficit present. Mental Status: She is alert and oriented to person, place, and time. Skin:     General: Skin is warm and dry. Psychiatric:         Mood and Affect: Mood normal.         Behavior: Behavior normal.   Vitals reviewed. Pregravid Weight/BMI: 73.5 kg (162 lb) (BMI 25.37)  Current Weight: 85.4 kg (188 lb 3.2 oz)   Total Weight Gain: 11.9 kg (26 lb 3.2 oz)   Pre-Carlos Vitals      Flowsheet Row Most Recent Value   Prenatal Assessment    Fetal Heart Rate 145   Fundal Height (cm) 37 cm   Movement Present   Presentation Vertex   Prenatal Vitals    Blood Pressure 130/86   Weight - Scale 85.4 kg (188 lb 3.2 oz)   Urine Albumin/Glucose    Dilation/Effacement/Station    Cervical Dilation 1   Cervical Effacement 50   Fetal Station -2   Vaginal Drainage    Edema            /80      Problem List          Digestive    Focal nodular hyperplasia of liver       Other    Seasonal allergies    SALLIE (generalized anxiety disorder)    Family history of thromboembolic disease    Overview     Thrombophilia labs: negative         Family history of cardiovascular disease    Supervision of normal first pregnancy, antepartum    Overview       CF/ SMA testing: negative  Flu vaccine:  completed  Covid vaccine:  completed X 2           38 weeks gestation of pregnancy    Fetal arrhythmia affecting pregnancy, antepartum    Fetal heart rate/rhythm abnormality, antepartum     Other Visit Diagnoses       Third trimester pregnancy    -  Primary           Patient continuing testing with MFM. NST reactive with no decelerations or arrhythmias today. She did have a small amount of spotting after cervical exam.  Advised patient to monitor. Follow-up next week as planned.   Considering induction of labor at the end of next week      Future Appointments   Date Time Provider [address] [place] [address]   2023  8:00 AM Alisia Araujo MD [facility]   2023  8:45 AM [address] [facility]   11/21/2023 10:00 AM Sidney Rehman MD [facility]               Sidney Rehman MD  11/10/2023  9:19 AM

## 2023-11-10 NOTE — PATIENT INSTRUCTIONS
Pregnancy at 28 to [facility]:   You are considered full term at the beginning of 37 weeks. Your breathing may be easier if your baby has moved down into a head-down position. You may need to urinate more often because the baby may be pressing on your bladder. You may also feel more discomfort and get tired easily. DISCHARGE INSTRUCTIONS:   Return to the emergency department if:   You develop a severe headache that does not go away. You have new or increased vision changes, such as blurred or spotted vision. You have new or increased swelling in your face or hands. You have vaginal spotting or bleeding. Your water broke or you feel warm water gushing or trickling from your vagina. Call your obstetrician if:   You have more than 5 contractions in 1 hour. You notice any changes in your baby's movements. You have abdominal cramps, pressure, or tightening. You have a change in vaginal discharge. You have chills or a fever. You have vaginal itching, burning, or pain. You have yellow, green, white, or foul-smelling vaginal discharge. You have pain or burning when you urinate, less urine than usual, or pink or bloody urine. You have questions or concerns about your condition or care. How to care for yourself at this stage of your pregnancy:       Eat a variety of healthy foods. Healthy foods include fruits, vegetables, whole-grain breads, low-fat dairy foods, beans, lean meats, and fish. Drink liquids as directed. Ask how much liquid to drink each day and which liquids are best for you. Limit caffeine to less than 200 milligrams each day. Limit your intake of fish to 2 servings each week. Choose fish low in mercury such as canned light tuna, shrimp, salmon, cod, or tilapia. Do not  eat fish high in mercury such as swordfish, tilefish, man mackerel, and shark. Take prenatal vitamins as directed.   Your need for certain vitamins and minerals, such as folic acid, increases during pregnancy. Prenatal vitamins provide some of the extra vitamins and minerals you need. Prenatal vitamins may also help to decrease the risk of certain birth defects. Rest as needed. Put your feet up if you have swelling in your ankles and feet. Talk to your healthcare provider about exercise. Moderate exercise can help you stay fit. Your healthcare provider will help you plan an exercise program that is safe for you during pregnancy. Do not smoke. Smoking increases your risk of a miscarriage and other health problems during your pregnancy. Smoking can cause your baby to be born early or weigh less at birth. Ask your healthcare provider for information if you need help quitting. Do not drink alcohol. Alcohol passes from your body to your baby through the placenta. It can affect your baby's brain development and cause fetal alcohol syndrome (FAS). FAS is a group of conditions that causes mental, behavior, and growth problems. Talk to your healthcare provider before you take any medicines. Many medicines may harm your baby if you take them when you are pregnant. Do not take any medicines, vitamins, herbs, or supplements without first talking to your healthcare provider. Never use illegal or street drugs (such as marijuana or cocaine) while you are pregnant. Safety tips:   Avoid hot tubs and saunas. Do not use a hot tub or sauna while you are pregnant, especially during your first trimester. Hot tubs and saunas may raise your baby's temperature and increase the risk of birth defects. Avoid toxoplasmosis. This is an infection caused by eating raw meat or being around infected cat feces. It can cause birth defects, miscarriages, and other problems. Wash your hands after you touch raw meat. Make sure any meat is well-cooked before you eat it. Avoid raw eggs and unpasteurized milk.  Use gloves or ask someone else to clean your cat's litter box while you are pregnant. Ask your healthcare provider about travel. The most comfortable time to travel is during the second trimester. Ask your provider if you can travel after 36 weeks. You may not be able to travel in an airplane after 36 weeks. He or she may also recommend you avoid long road trips. Changes happening with your baby:  By 38 weeks, your baby may weigh between 6 and 9 pounds. Your baby may be about 14 inches long from the top of the head to the rump (baby's bottom). Your baby hears well enough to know your voice. As your baby gets larger, you may feel fewer kicks and more stretching and rolling. Your baby may move into a head-down position. Your baby will also rest lower in your abdomen. What you need to know about prenatal care: Your healthcare provider will check your blood pressure and weight. You may also need the following:  A urine test  may also be done to check for sugar and protein. These can be signs of gestational diabetes or infection. Protein in your urine may also be a sign of preeclampsia. Preeclampsia is a condition that can develop during week 20 or later of your pregnancy. It causes high blood pressure, and it can cause problems with your kidneys and other organs. A gestational diabetes screen  may be done. Your healthcare provider may order either a 1-step or 2-step oral glucose tolerance test (OGTT). 1-step OGTT:  Your blood sugar level will be tested after you have not eaten for 8 hours (fasting). You will then be given a glucose drink. Your level will be tested again 1 hour and 2 hours after you finish the drink. 2-step OGTT:  You do not have to fast for the first part of the test. You will have the glucose drink at any time of day. Your blood sugar level will be checked 1 hour later. If your blood sugar is higher than a certain level, another test will be ordered. You will fast and your blood sugar level will be tested. You will have the glucose drink.  Your blood will be tested again 1 hour, 2 hours, and 3 hours after you finish the glucose drink. A blood test  may be done to check for anemia (low iron level). A Tdap vaccine  may be recommended by your healthcare provider. A group B strep test  is a test that is done to check for group B strep infection. Group B strep is a type of bacteria that may be found in the vagina or rectum. It can be passed to your baby during delivery if you have it. Your healthcare provider will take swab your vagina or rectum and send the sample to the lab for tests. Fundal height  is a measurement of your uterus to check your baby's growth. This number is usually the same as the number of weeks that you have been pregnant. Your healthcare provider may also check your baby's position. Your baby's heart rate  will be checked. Follow up with your obstetrician as directed:  Write down your questions so you remember to ask them during your visits. © Copyright [facility] 2023 Information is for End User's use only and may not be sold, redistributed or otherwise used for commercial purposes. The above information is an  only. It is not intended as medical advice for individual conditions or treatments. Talk to your doctor, nurse or pharmacist before following any medical regimen to see if it is safe and effective for you.

## 2023-11-13 ENCOUNTER — ROUTINE PRENATAL (OUTPATIENT)
Dept: OBGYN CLINIC | Facility: CLINIC | Age: 28
End: 2023-11-13

## 2023-11-13 VITALS
DIASTOLIC BLOOD PRESSURE: 78 MMHG | HEART RATE: 60 BPM | BODY MASS INDEX: 29.62 KG/M2 | SYSTOLIC BLOOD PRESSURE: 124 MMHG | HEIGHT: 67 IN | WEIGHT: 188.7 LBS

## 2023-11-13 DIAGNOSIS — Z34.00 SUPERVISION OF NORMAL FIRST PREGNANCY, ANTEPARTUM: ICD-10-CM

## 2023-11-13 DIAGNOSIS — Z82.49 FAMILY HISTORY OF CARDIOVASCULAR DISEASE: ICD-10-CM

## 2023-11-13 DIAGNOSIS — Z3A.38 38 WEEKS GESTATION OF PREGNANCY: ICD-10-CM

## 2023-11-13 DIAGNOSIS — Z34.93 THIRD TRIMESTER PREGNANCY: Primary | ICD-10-CM

## 2023-11-13 DIAGNOSIS — O36.8390 FETAL ARRHYTHMIA AFFECTING PREGNANCY, ANTEPARTUM: ICD-10-CM

## 2023-11-13 PROCEDURE — PNV: Performed by: OBSTETRICS & GYNECOLOGY

## 2023-11-13 NOTE — PROGRESS NOTES
OB/GYN  PN Visit  Linda Maldonado  [id number]  2023  3:53 PM  Dr. Sung Chao MD    S: 29 y.o. Genesis Garcia 38w6d here for PN visit. Chief Complaint   Patient presents with    Routine Prenatal Visit     Pink tinge discharge         OB complaints:  Contractions: no  Leakage: no  Bleeding: no  Fetal movement: yes      O:  /78 (BP Location: Right arm, Patient Position: Sitting, Cuff Size: Adult)   Pulse 60   Ht 5' 7" (1.702 m)   Wt 85.6 kg (188 lb 11.2 oz)   LMP 2023 (Exact Date)   BMI 29.55 kg/m²       Review of Systems   Constitutional: Negative. HENT: Negative. Eyes: Negative. Respiratory: Negative. Cardiovascular: Negative. Gastrointestinal: Negative. Endocrine: Negative. Genitourinary:         As noted in HPI   Musculoskeletal: Negative. Skin: Negative. Allergic/Immunologic: Negative. Neurological: Negative. Hematological: Negative. Psychiatric/Behavioral: Negative. Physical Exam  Constitutional:       General: She is not in acute distress. Appearance: Normal appearance. She is well-developed. Abdominal:      Palpations: Abdomen is soft. Tenderness: There is no abdominal tenderness. There is no guarding. Neurological:      Mental Status: She is alert and oriented to person, place, and time. Skin:     General: Skin is warm and dry.    Psychiatric:         Behavior: Behavior normal.             Pregravid Weight/BMI: 73.5 kg (162 lb) (BMI 25.37)  Current Weight: 85.6 kg (188 lb 11.2 oz)   Total Weight Gain: 12.1 kg (26 lb 11.2 oz)   Pre- Vitals      Flowsheet Row Most Recent Value   Prenatal Assessment    Fetal Heart Rate 138   Fundal Height (cm) 38 cm   Movement Present   Presentation Vertex   Prenatal Vitals    Blood Pressure 124/78   Weight - Scale 85.6 kg (188 lb 11.2 oz)   Urine Albumin/Glucose    Dilation/Effacement/Station    Cervical Dilation 1   Cervical Effacement 50   Fetal Station -2   Vaginal Drainage    Draining Fluid Yes   Fluid Color Pink   Edema              Problem List          Digestive    Focal nodular hyperplasia of liver       Other    Seasonal allergies    SALLIE (generalized anxiety disorder)    Family history of thromboembolic disease    Overview     Thrombophilia labs: negative         Family history of cardiovascular disease    Supervision of normal first pregnancy, antepartum    Overview       CF/ SMA testing: negative  Flu vaccine:  completed  Covid vaccine:  completed X 2           38 weeks gestation of pregnancy    Fetal arrhythmia affecting pregnancy, antepartum    Fetal heart rate/rhythm abnormality, antepartum     Other Visit Diagnoses       Third trimester pregnancy    -  Primary             Patient interested in elective induction of labor after 39 weeks. She has been having on and off contractions, mucoid vaginal discharge that is pink. Speculum exam is negative for pooling. Nitrazine and ferning are negative. She will keep her [facility] appointment tomorrow for her weekly testing. She is scheduled for induction at 8 PM tomorrow night at [facility].       Future Appointments   Date Time Provider [address]   2023  8:00 AM Rennis Mortimer, MD [facility]   2023  8:45 AM  NURSE [address] [place] [address]   2023 10:00 AM Rennis Mortimer, MD [facility]               Rennis Mortimer, MD  2023  3:53 PM

## 2023-11-13 NOTE — PATIENT INSTRUCTIONS
Pregnancy at 28 to [address] [facility]:   You are considered full term at the beginning of 37 weeks. Your breathing may be easier if your baby has moved down into a head-down position. You may need to urinate more often because the baby may be pressing on your bladder. You may also feel more discomfort and get tired easily. DISCHARGE INSTRUCTIONS:   Return to the emergency department if:   You develop a severe headache that does not go away. You have new or increased vision changes, such as blurred or spotted vision. You have new or increased swelling in your face or hands. You have vaginal spotting or bleeding. Your water broke or you feel warm water gushing or trickling from your vagina. Call your obstetrician if:   You have more than 5 contractions in 1 hour. You notice any changes in your baby's movements. You have abdominal cramps, pressure, or tightening. You have a change in vaginal discharge. You have chills or a fever. You have vaginal itching, burning, or pain. You have yellow, green, white, or foul-smelling vaginal discharge. You have pain or burning when you urinate, less urine than usual, or pink or bloody urine. You have questions or concerns about your condition or care. How to care for yourself at this stage of your pregnancy:       Eat a variety of healthy foods. Healthy foods include fruits, vegetables, whole-grain breads, low-fat dairy foods, beans, lean meats, and fish. Drink liquids as directed. Ask how much liquid to drink each day and which liquids are best for you. Limit caffeine to less than 200 milligrams each day. Limit your intake of fish to 2 servings each week. Choose fish low in mercury such as canned light tuna, shrimp, salmon, cod, or tilapia. Do not  eat fish high in mercury such as swordfish, tilefish, man mackerel, and shark. Take prenatal vitamins as directed.   Your need for certain vitamins and minerals, such as folic acid, increases during pregnancy. Prenatal vitamins provide some of the extra vitamins and minerals you need. Prenatal vitamins may also help to decrease the risk of certain birth defects. Rest as needed. Put your feet up if you have swelling in your ankles and feet. Talk to your healthcare provider about exercise. Moderate exercise can help you stay fit. Your healthcare provider will help you plan an exercise program that is safe for you during pregnancy. Do not smoke. Smoking increases your risk of a miscarriage and other health problems during your pregnancy. Smoking can cause your baby to be born early or weigh less at birth. Ask your healthcare provider for information if you need help quitting. Do not drink alcohol. Alcohol passes from your body to your baby through the placenta. It can affect your baby's brain development and cause fetal alcohol syndrome (FAS). FAS is a group of conditions that causes mental, behavior, and growth problems. Talk to your healthcare provider before you take any medicines. Many medicines may harm your baby if you take them when you are pregnant. Do not take any medicines, vitamins, herbs, or supplements without first talking to your healthcare provider. Never use illegal or street drugs (such as marijuana or cocaine) while you are pregnant. Safety tips:   Avoid hot tubs and saunas. Do not use a hot tub or sauna while you are pregnant, especially during your first trimester. Hot tubs and saunas may raise your baby's temperature and increase the risk of birth defects. Avoid toxoplasmosis. This is an infection caused by eating raw meat or being around infected cat feces. It can cause birth defects, miscarriages, and other problems. Wash your hands after you touch raw meat. Make sure any meat is well-cooked before you eat it. Avoid raw eggs and unpasteurized milk.  Use gloves or ask someone else to clean your cat's litter box while you are pregnant. Ask your healthcare provider about travel. The most comfortable time to travel is during the second trimester. Ask your provider if you can travel after 36 weeks. You may not be able to travel in an airplane after 36 weeks. He or she may also recommend you avoid long road trips. Changes happening with your baby:  By 38 weeks, your baby may weigh between 6 and 9 pounds. Your baby may be about 14 inches long from the top of the head to the rump (baby's bottom). Your baby hears well enough to know your voice. As your baby gets larger, you may feel fewer kicks and more stretching and rolling. Your baby may move into a head-down position. Your baby will also rest lower in your abdomen. What you need to know about prenatal care: Your healthcare provider will check your blood pressure and weight. You may also need the following:  A urine test  may also be done to check for sugar and protein. These can be signs of gestational diabetes or infection. Protein in your urine may also be a sign of preeclampsia. Preeclampsia is a condition that can develop during week 20 or later of your pregnancy. It causes high blood pressure, and it can cause problems with your kidneys and other organs. A gestational diabetes screen  may be done. Your healthcare provider may order either a 1-step or 2-step oral glucose tolerance test (OGTT). 1-step OGTT:  Your blood sugar level will be tested after you have not eaten for 8 hours (fasting). You will then be given a glucose drink. Your level will be tested again 1 hour and 2 hours after you finish the drink. 2-step OGTT:  You do not have to fast for the first part of the test. You will have the glucose drink at any time of day. Your blood sugar level will be checked 1 hour later. If your blood sugar is higher than a certain level, another test will be ordered. You will fast and your blood sugar level will be tested. You will have the glucose drink.  Your blood will be tested again 1 hour, 2 hours, and 3 hours after you finish the glucose drink. A blood test  may be done to check for anemia (low iron level). A Tdap vaccine  may be recommended by your healthcare provider. A group B strep test  is a test that is done to check for group B strep infection. Group B strep is a type of bacteria that may be found in the vagina or rectum. It can be passed to your baby during delivery if you have it. Your healthcare provider will take swab your vagina or rectum and send the sample to the lab for tests. Fundal height  is a measurement of your uterus to check your baby's growth. This number is usually the same as the number of weeks that you have been pregnant. Your healthcare provider may also check your baby's position. Your baby's heart rate  will be checked. Follow up with your obstetrician as directed:  Write down your questions so you remember to ask them during your visits. © Copyright [employer] 2023 Information is for End User's use only and may not be sold, redistributed or otherwise used for commercial purposes. The above information is an  only. It is not intended as medical advice for individual conditions or treatments. Talk to your doctor, nurse or pharmacist before following any medical regimen to see if it is safe and effective for you.

## 2023-11-13 NOTE — PROGRESS NOTES
Please refer to the [facility] ultrasound report in Ob Procedures for additional information regarding today's visit

## 2023-11-14 ENCOUNTER — ULTRASOUND (OUTPATIENT)
Dept: PERINATAL CARE | Facility: OTHER | Age: 28
End: 2023-11-14
Payer: COMMERCIAL

## 2023-11-14 ENCOUNTER — ROUTINE PRENATAL (OUTPATIENT)
Dept: OBGYN CLINIC | Facility: CLINIC | Age: 28
End: 2023-11-14

## 2023-11-14 ENCOUNTER — HOSPITAL ENCOUNTER (INPATIENT)
Facility: HOSPITAL | Age: 28
LOS: 2 days | Discharge: HOME | End: 2023-11-16
Attending: OBSTETRICS & GYNECOLOGY | Admitting: OBSTETRICS & GYNECOLOGY
Payer: COMMERCIAL

## 2023-11-14 ENCOUNTER — HOSPITAL ENCOUNTER (OUTPATIENT)
Dept: LABOR AND DELIVERY | Facility: HOSPITAL | Age: 28
Discharge: HOME | End: 2023-11-14
Payer: COMMERCIAL

## 2023-11-14 VITALS
WEIGHT: 188.7 LBS | OXYGEN SATURATION: 99 % | BODY MASS INDEX: 29.62 KG/M2 | HEIGHT: 67 IN | DIASTOLIC BLOOD PRESSURE: 82 MMHG | SYSTOLIC BLOOD PRESSURE: 134 MMHG | HEART RATE: 88 BPM | TEMPERATURE: 98.1 F

## 2023-11-14 VITALS
WEIGHT: 188.2 LBS | DIASTOLIC BLOOD PRESSURE: 76 MMHG | BODY MASS INDEX: 29.54 KG/M2 | HEIGHT: 67 IN | HEART RATE: 88 BPM | SYSTOLIC BLOOD PRESSURE: 126 MMHG

## 2023-11-14 DIAGNOSIS — Z3A.39 39 WEEKS GESTATION OF PREGNANCY: ICD-10-CM

## 2023-11-14 DIAGNOSIS — O36.8390 FETAL ARRHYTHMIA AFFECTING PREGNANCY, ANTEPARTUM: ICD-10-CM

## 2023-11-14 DIAGNOSIS — Z34.93 THIRD TRIMESTER PREGNANCY: Primary | ICD-10-CM

## 2023-11-14 DIAGNOSIS — Z3A.39 39 WEEKS GESTATION OF PREGNANCY: Primary | ICD-10-CM

## 2023-11-14 DIAGNOSIS — Z34.00 SUPERVISION OF NORMAL FIRST PREGNANCY, ANTEPARTUM: ICD-10-CM

## 2023-11-14 LAB
ABO GROUP BLD: NORMAL
BLD GP AB SCN SERPL QL: NEGATIVE
ERYTHROCYTE [DISTWIDTH] IN BLOOD BY AUTOMATED COUNT: 12.7 % (ref 11.6–15.1)
HCT VFR BLD AUTO: 34.7 % (ref 34.8–46.1)
HGB BLD-MCNC: 11.9 G/DL (ref 11.5–15.4)
HOLD SPECIMEN: YES
MCH RBC QN AUTO: 32.1 PG (ref 26.8–34.3)
MCHC RBC AUTO-ENTMCNC: 34.3 G/DL (ref 31.4–37.4)
MCV RBC AUTO: 94 FL (ref 82–98)
PLATELET # BLD AUTO: 196 THOUSANDS/UL (ref 149–390)
PMV BLD AUTO: 10.9 FL (ref 8.9–12.7)
RBC # BLD AUTO: 3.71 MILLION/UL (ref 3.81–5.12)
RH BLD: POSITIVE
SPECIMEN EXPIRATION DATE: NORMAL
TREPONEMA PALLIDUM IGG+IGM AB [PRESENCE] IN SERUM OR PLASMA BY IMMUNOASSAY: NORMAL
WBC # BLD AUTO: 9.71 THOUSAND/UL (ref 4.31–10.16)

## 2023-11-14 PROCEDURE — PNV: Performed by: OBSTETRICS & GYNECOLOGY

## 2023-11-14 PROCEDURE — 4A1HXCZ MONITORING OF PRODUCTS OF CONCEPTION, CARDIAC RATE, EXTERNAL APPROACH: ICD-10-PCS | Performed by: OBSTETRICS & GYNECOLOGY

## 2023-11-14 PROCEDURE — 86780 TREPONEMA PALLIDUM: CPT

## 2023-11-14 PROCEDURE — 86900 BLOOD TYPING SEROLOGIC ABO: CPT

## 2023-11-14 PROCEDURE — 85027 COMPLETE CBC AUTOMATED: CPT

## 2023-11-14 PROCEDURE — 76815 OB US LIMITED FETUS(S): CPT | Performed by: OBSTETRICS & GYNECOLOGY

## 2023-11-14 PROCEDURE — 59025 FETAL NON-STRESS TEST: CPT | Performed by: OBSTETRICS & GYNECOLOGY

## 2023-11-14 PROCEDURE — NC001 PR NO CHARGE: Performed by: OBSTETRICS & GYNECOLOGY

## 2023-11-14 PROCEDURE — 86901 BLOOD TYPING SEROLOGIC RH(D): CPT

## 2023-11-14 PROCEDURE — 86850 RBC ANTIBODY SCREEN: CPT

## 2023-11-14 RX ORDER — BUPIVACAINE HYDROCHLORIDE 2.5 MG/ML
30 INJECTION, SOLUTION EPIDURAL; INFILTRATION; INTRACAUDAL ONCE AS NEEDED
Status: DISCONTINUED | OUTPATIENT
Start: 2023-11-14 | End: 2023-11-15

## 2023-11-14 RX ORDER — ONDANSETRON 2 MG/ML
4 INJECTION INTRAMUSCULAR; INTRAVENOUS EVERY 4 HOURS PRN
Status: DISCONTINUED | OUTPATIENT
Start: 2023-11-14 | End: 2023-11-15

## 2023-11-14 RX ORDER — SODIUM CHLORIDE, SODIUM LACTATE, POTASSIUM CHLORIDE, CALCIUM CHLORIDE 600; 310; 30; 20 MG/100ML; MG/100ML; MG/100ML; MG/100ML
125 INJECTION, SOLUTION INTRAVENOUS CONTINUOUS
Status: DISCONTINUED | OUTPATIENT
Start: 2023-11-14 | End: 2023-11-15

## 2023-11-14 RX ADMIN — SODIUM CHLORIDE, SODIUM LACTATE, POTASSIUM CHLORIDE, AND CALCIUM CHLORIDE 125 ML/HR: .6; .31; .03; .02 INJECTION, SOLUTION INTRAVENOUS at 22:30

## 2023-11-14 RX ADMIN — Medication 25 MCG: at 22:27

## 2023-11-14 NOTE — PROGRESS NOTES
Repeat Non-Stress Testing:    Patient verbalizes +FM. Pt denies ALL:               Leaking of fluid   Contractions   Vaginal bleeding   Decreased fetal movement    Patient is performing daily kick counts. Patient has no questions or concerns. NST strip reviewed by Dr. Lotus Campbell.

## 2023-11-14 NOTE — PATIENT INSTRUCTIONS
Induction of Labor    What is Induction of Labor? Induction of labor is when labor is started (induced) before it begins on its own. Medicines and other methods are used to start contractions and help your cervix soften, thin (efface), and dilate (open). You may be given antibiotics to fight a bacterial infection you have or prevent an infection during delivery. There are two types:  When labor is started due to a medical problem you or the baby have, this is called an indicated induction. This may happen if the risk of a longer pregnancy is greater then delivering the baby early. If labor is started for convenience it is called an elective induction. When can you have an elective induction? Before a decision can be made for an elective induction of labor, you healthcare provider must assess the following information about your pregnancy:  Due date  At least 39 weeks pregnant  Is your cervix showing signs of being ready for labor? Confirm that you have not had a previous  section or major surgery on your uterus. How is labor induced? There are several ways to induce labor. Your doctor will choose what's best for you. Stripping membranes   The doctor uses the tip of a finger to separate the amniotic membrane from the wall of your uterus while checking on your cervix. This causes your body to release hormones called prostaglandins. Prostaglandins help the cervix become soft, thin and ready for labor. Breaking your water  The doctor uses a plastic hook to make a small hole in the amniotic sac (bag of water)  Ching Balloon  This is a small catheter with a balloon on the end which is inserted into the vagina. The balloon is inflated with saline to help the cervix expand. The intention is to mechanically dilate the cervix to a point where it is safe to start Pitocin. You may be out of bed with this method.  In fact it is encouraged since gravity will assist in moving the balloon down through your cervix. Medications  Cytotec  This is used when your cervix is still closed and thick. It is a small tablet inserted into your vagina by the physician. This tablet may be inserted every three hours as needed until your cervix opens and thins. After the medication is placed you will be on the fetal heart monitor for a period of time. You will need to be in bed for this. After monitoring is over you will be allowed to walk or sit in a chair. The fetal monitoring can be performed intermittently thereafter, unless you go into active labor or there is a change in your or the baby's condition. Pitocin  This is an IV medication that is administered slowly through a pump and increased in small increments until a productive pattern of labor is achieved. The goal is to cause your contractions to begin and to stay strong and regular. Your baby will need to be constantly monitored on this medication. You may either be in the bed, a chair or on the birthing ball, as long as the baby's heartrate can be monitored safely. What are the risks of an induction? The baby may need to go to the  Intensive Care Unit (NICU). The baby's lungs may not be mature. The baby may have trouble with control of body temperature  You may have a longer labor. You have an increased risk of having a  section. Medicines used to induce labor may cause too many contractions. This can lower your baby's heartbeat and decrease his or her oxygen supply. Induction of labor may also increase the risk of umbilical cord prolapse. This condition causes the umbilical cord to slip into the vagina before delivery. It can compress the cord and decrease your baby's oxygen supply. If this were to happen, you will need a   Medical induction may cause an infection in you or your baby.            Preparing for your Induction    Overnight Induction    When you are scheduled for an overnight induction, it is most likely because your cervix is closed. Your body is not ready for labor and therefore you may need medication overnight to prepare your cervix for the Pitocin in the morning. This type of induction can take a day, or more of labor. This is normal. We are asking  your body do something it is not quite ready to do yet. For an overnight induction please eat a regular dinner before you come in. Once you arrive at the hospital you will be on a clear liquid diet until you deliver the baby. That will consist of juices (not orange juice), soda, broths, popsicles, Jello and water ice. Morning Induction  Eat a regular diet up until 2 hours before your induction. Ater that, only clear fluids (see above). Please be prepared for an induction to take a day or two until you deliver your baby. As mentioned earlier, we are asking your body to do something it is not quite ready to do yet.

## 2023-11-14 NOTE — PROGRESS NOTES
OB/GYN  PN Visit  Marques Recinos  [id number]  2023  8:26 AM  Dr. Karyle Fort, MD    S: 29 y.o. [facility] 39w0d here for PN visit. Chief Complaint   Patient presents with    Routine Prenatal Visit     No concerns         OB complaints:  Contractions: no  Leakage: no  Bleeding: no  Fetal movement: yes      O:  /82 (BP Location: Right arm, Patient Position: Sitting, Cuff Size: Adult)   Pulse 88   Temp 98.1 °F (36.7 °C) (Tympanic)   Ht 5' 7" (1.702 m)   Wt 85.6 kg (188 lb 11.2 oz)   LMP 2023 (Exact Date)   SpO2 99%   BMI 29.55 kg/m²       Review of Systems   Constitutional: Negative. HENT: Negative. Eyes: Negative. Respiratory: Negative. Cardiovascular: Negative. Gastrointestinal: Negative. Endocrine: Negative. Genitourinary:         As noted in HPI   Musculoskeletal: Negative. Skin: Negative. Allergic/Immunologic: Negative. Neurological: Negative. Hematological: Negative. Psychiatric/Behavioral: Negative. Physical Exam  Constitutional:       General: She is not in acute distress. Appearance: Normal appearance. She is well-developed. Abdominal:      Palpations: Abdomen is soft. Tenderness: There is no abdominal tenderness. There is no guarding. Neurological:      Mental Status: She is alert and oriented to person, place, and time. Skin:     General: Skin is warm and dry.    Psychiatric:         Behavior: Behavior normal.             Pregravid Weight/BMI: 73.5 kg (162 lb) (BMI 25.37)  Current Weight: 85.6 kg (188 lb 11.2 oz)   Total Weight Gain: 12.1 kg (26 lb 11.2 oz)   Pre-Carlos Vitals      Flowsheet Row Most Recent Value   Prenatal Assessment    Fetal Heart Rate 155   Fundal Height (cm) 38 cm   Movement Present   Presentation Vertex   Prenatal Vitals    Blood Pressure 134/82   Weight - Scale 85.6 kg (188 lb 11.2 oz)   Urine Albumin/Glucose    Dilation/Effacement/Station    Cervical Dilation 1   Cervical Effacement 50   Fetal Station -2   Vaginal Drainage    Edema              Problem List          Digestive    Focal nodular hyperplasia of liver       Other    Seasonal allergies    SALLIE (generalized anxiety disorder)    Family history of thromboembolic disease    Overview     Thrombophilia labs: negative         Family history of cardiovascular disease    Supervision of normal first pregnancy, antepartum    Overview       CF/ SMA testing: negative  Flu vaccine:  completed  Covid vaccine:  completed X 2           38 weeks gestation of pregnancy    Fetal arrhythmia affecting pregnancy, antepartum    Fetal heart rate/rhythm abnormality, antepartum     Other Visit Diagnoses       Third trimester pregnancy              Induction of labor process discussed. Discussed indication for induction such as elective (> or equal to 39 weeks), medical/obstetric indications and alternatives which is to await spontaneous labor. Discussed cervical ripening if cervix is unfavorable with prostaglandin (vaginal misoprostol) or mechanical dilation with insertion of balloon catheter. Discussed that when electively induced nulliparous or multiparous women are compared with expectantly managed women, there is no evidence that elective induction is associated with increased risk of .           Future Appointments   Date Time Provider [address]   2023  8:45 AM [facility] [facility]   2023  8:00 PM AL L&D ROOM AL L&D [facility]   2023 10:00 AM Sidney Rehman MD [facility]               Sidney Rehman MD  2023  8:26 AM

## 2023-11-14 NOTE — LETTER
November 14, 2023     Jordana Richard, [address]   [address]    Patient: Jorge Marr   YOB: 1995   Date of Visit: 11/14/2023       Dear Dr. Lorin Siddiqi: Thank you for referring Jorge Marr to me for evaluation. Below are my notes for this consultation. If you have questions, please do not hesitate to call me. I look forward to following your patient along with you.          Sincerely,        Francisco Tejada MD        CC: No Recipients    Francisco Tejada MD  11/14/2023  8:35 AM  Sign when Signing Visit  Please refer to the [facility] ultrasound report in Ob Procedures for additional information regarding today's visit

## 2023-11-15 ENCOUNTER — ANESTHESIA EVENT (INPATIENT)
Dept: ANESTHESIOLOGY | Facility: HOSPITAL | Age: 28
End: 2023-11-15
Payer: COMMERCIAL

## 2023-11-15 ENCOUNTER — ANESTHESIA (INPATIENT)
Dept: ANESTHESIOLOGY | Facility: HOSPITAL | Age: 28
End: 2023-11-15
Payer: COMMERCIAL

## 2023-11-15 LAB
BASE EXCESS BLDCOA CALC-SCNC: -2.9 MMOL/L (ref 3–11)
BASE EXCESS BLDCOV CALC-SCNC: -0.5 MMOL/L (ref 1–9)
HCO3 BLDCOA-SCNC: 25 MMOL/L (ref 17.3–27.3)
HCO3 BLDCOV-SCNC: 22.8 MMOL/L (ref 12.2–28.6)
O2 CT VFR BLDCOA CALC: 9.3 ML/DL
OXYHGB MFR BLDCOA: 39.3 %
OXYHGB MFR BLDCOV: 80.7 %
PCO2 BLDCOA: 55.1 MM[HG] (ref 30–60)
PCO2 BLDCOV: 34.3 MM HG (ref 27–43)
PH BLDCOA: 7.27 [PH] (ref 7.23–7.43)
PH BLDCOV: 7.44 [PH] (ref 7.19–7.49)
PO2 BLDCOA: 19.8 MM HG (ref 5–25)
PO2 BLDCOV: 35.7 MM HG (ref 15–45)
SAO2 % BLDCOV: 20 ML/DL

## 2023-11-15 PROCEDURE — 59400 OBSTETRICAL CARE: CPT | Performed by: OBSTETRICS & GYNECOLOGY

## 2023-11-15 PROCEDURE — 0HQ9XZZ REPAIR PERINEUM SKIN, EXTERNAL APPROACH: ICD-10-PCS | Performed by: OBSTETRICS & GYNECOLOGY

## 2023-11-15 PROCEDURE — 82805 BLOOD GASES W/O2 SATURATION: CPT | Performed by: OBSTETRICS & GYNECOLOGY

## 2023-11-15 RX ORDER — SENNOSIDES 8.6 MG
1 TABLET ORAL DAILY
Status: DISCONTINUED | OUTPATIENT
Start: 2023-11-16 | End: 2023-11-16 | Stop reason: HOSPADM

## 2023-11-15 RX ORDER — DIAPER,BRIEF,INFANT-TODD,DISP
1 EACH MISCELLANEOUS DAILY PRN
Status: DISCONTINUED | OUTPATIENT
Start: 2023-11-15 | End: 2023-11-16 | Stop reason: HOSPADM

## 2023-11-15 RX ORDER — ROPIVACAINE HYDROCHLORIDE 2 MG/ML
INJECTION, SOLUTION EPIDURAL; INFILTRATION; PERINEURAL
Status: COMPLETED | OUTPATIENT
Start: 2023-11-15 | End: 2023-11-15

## 2023-11-15 RX ORDER — CHLOROPROCAINE HYDROCHLORIDE 30 MG/ML
INJECTION, SOLUTION EPIDURAL; INFILTRATION; INTRACAUDAL; PERINEURAL AS NEEDED
Status: DISCONTINUED | OUTPATIENT
Start: 2023-11-15 | End: 2023-11-15

## 2023-11-15 RX ORDER — DIPHENHYDRAMINE HYDROCHLORIDE 50 MG/ML
12.5 INJECTION INTRAMUSCULAR; INTRAVENOUS EVERY 6 HOURS PRN
Status: DISCONTINUED | OUTPATIENT
Start: 2023-11-15 | End: 2023-11-15

## 2023-11-15 RX ORDER — ACETAMINOPHEN 325 MG/1
650 TABLET ORAL EVERY 4 HOURS PRN
Status: DISCONTINUED | OUTPATIENT
Start: 2023-11-15 | End: 2023-11-16 | Stop reason: HOSPADM

## 2023-11-15 RX ORDER — ONDANSETRON 2 MG/ML
4 INJECTION INTRAMUSCULAR; INTRAVENOUS EVERY 8 HOURS PRN
Status: DISCONTINUED | OUTPATIENT
Start: 2023-11-15 | End: 2023-11-16 | Stop reason: HOSPADM

## 2023-11-15 RX ORDER — OXYTOCIN/RINGER'S LACTATE 30/500 ML
250 PLASTIC BAG, INJECTION (ML) INTRAVENOUS ONCE
Status: DISCONTINUED | OUTPATIENT
Start: 2023-11-15 | End: 2023-11-16 | Stop reason: HOSPADM

## 2023-11-15 RX ORDER — IBUPROFEN 600 MG/1
600 TABLET ORAL EVERY 6 HOURS
Status: DISCONTINUED | OUTPATIENT
Start: 2023-11-15 | End: 2023-11-16 | Stop reason: HOSPADM

## 2023-11-15 RX ORDER — OXYTOCIN/RINGER'S LACTATE 30/500 ML
1-30 PLASTIC BAG, INJECTION (ML) INTRAVENOUS
Status: DISCONTINUED | OUTPATIENT
Start: 2023-11-15 | End: 2023-11-15

## 2023-11-15 RX ORDER — CALCIUM CARBONATE 500 MG/1
1000 TABLET, CHEWABLE ORAL DAILY PRN
Status: DISCONTINUED | OUTPATIENT
Start: 2023-11-15 | End: 2023-11-16 | Stop reason: HOSPADM

## 2023-11-15 RX ORDER — SIMETHICONE 80 MG
80 TABLET,CHEWABLE ORAL 4 TIMES DAILY PRN
Status: DISCONTINUED | OUTPATIENT
Start: 2023-11-15 | End: 2023-11-16 | Stop reason: HOSPADM

## 2023-11-15 RX ADMIN — ROPIVACAINE HYDROCHLORIDE 10 ML: 2 INJECTION, SOLUTION EPIDURAL; INFILTRATION at 07:45

## 2023-11-15 RX ADMIN — IBUPROFEN 600 MG: 600 TABLET ORAL at 18:39

## 2023-11-15 RX ADMIN — ROPIVACAINE HYDROCHLORIDE 10 ML/HR: 2 INJECTION, SOLUTION EPIDURAL; INFILTRATION at 07:59

## 2023-11-15 RX ADMIN — Medication 2 MILLI-UNITS/MIN: at 03:50

## 2023-11-15 RX ADMIN — BENZOCAINE AND LEVOMENTHOL 1 APPLICATION: 200; 5 SPRAY TOPICAL at 18:40

## 2023-11-15 RX ADMIN — ROPIVACAINE HYDROCHLORIDE: 2 INJECTION, SOLUTION EPIDURAL; INFILTRATION at 07:55

## 2023-11-15 RX ADMIN — SODIUM CHLORIDE, SODIUM LACTATE, POTASSIUM CHLORIDE, AND CALCIUM CHLORIDE 999 ML/HR: .6; .31; .03; .02 INJECTION, SOLUTION INTRAVENOUS at 06:42

## 2023-11-15 RX ADMIN — SODIUM CHLORIDE, SODIUM LACTATE, POTASSIUM CHLORIDE, AND CALCIUM CHLORIDE 125 ML/HR: .6; .31; .03; .02 INJECTION, SOLUTION INTRAVENOUS at 07:52

## 2023-11-15 RX ADMIN — SODIUM CHLORIDE, SODIUM LACTATE, POTASSIUM CHLORIDE, AND CALCIUM CHLORIDE 125 ML/HR: .6; .31; .03; .02 INJECTION, SOLUTION INTRAVENOUS at 13:17

## 2023-11-15 NOTE — OB LABOR/OXYTOCIN SAFETY PROGRESS
Labor Progress Note - Parker Avers 29 y.o. female MRN: [id number]    Unit/Bed#: L&D 326-01 Encounter: [id number]    Dose (lucio-units/min) Oxytocin: 14 lucio-units/min  Contraction Frequency (minutes): 2-3  Contraction Quality: Moderate  Tachysystole: No   Cervical Dilation: 4        Cervical Effacement: 80  Fetal Station: -1  Baseline Rate: 135 bpm     FHR Category: Category 2             Vital Signs:   Vitals:    11/15/23 1121   BP: 117/65   Pulse: 64   Resp:    Temp:        Notes/comments:   FHR was noted to have several late decelerations previously. Position changed and IV bolus administered. FHR improved.       Leni Ling MD 11/15/2023 11:29 AM

## 2023-11-15 NOTE — OB LABOR/OXYTOCIN SAFETY PROGRESS
Labor Progress Note - Tam Velazco 29 y.o. female MRN: [id number]    Unit/Bed#: L&D 326-01 Encounter: [id number]    Dose (lucio-units/min) Oxytocin: 10 lucio-units/min  Contraction Frequency (minutes): 2-4  Contraction Quality: Mild  Tachysystole: No   Cervical Dilation: 4        Cervical Effacement: 60  Fetal Station: -2  Baseline Rate: 130 bpm     FHR Category: I               Vital Signs:   Vitals:    11/15/23 0833   BP: 102/75   Pulse: 91   Resp:    Temp:        Notes/comments:   Pt comfortable s/p epidural. SVE as above unchanged. AROM for moderate clear fluid. FHT category I.  Continue pitocin titration per protocol       Mode Sims MD 11/15/2023 9:04 AM

## 2023-11-15 NOTE — OB LABOR/OXYTOCIN SAFETY PROGRESS
Oxytocin Safety Progress Check Note - Ky Montez 29 y.o. female MRN: [id number]    Unit/Bed#: L&D 326-01 Encounter: [id number]    Dose (lucio-units/min) Oxytocin: 10 lucio-units/min  Contraction Frequency (minutes): 2-3  Contraction Quality: Moderate  Tachysystole: No   Cervical Dilation: 10  Dilation Complete Date: 11/15/23  Dilation Complete Time: 1502  Cervical Effacement: 100  Fetal Station: 3  Baseline Rate: 130 bpm     FHR Category: I               Vital Signs:   Vitals:    11/15/23 1349   BP: 133/78   Pulse: 70   Resp:    Temp:        Notes/comments:   Patient noting constant pressure, SVE as above. FHR category I. Anticipate starting pushing and  soon.     Render MD Karthik 11/15/2023 3:02 PM

## 2023-11-15 NOTE — H&P
H&P - Obstetrics   Tam Velazco 29 y.o. female MRN: [id number]  Unit/Bed#: L&D 326-01 Encounter: [id number]    Assessment and Plan:  29 y.o. Yoli Noble at 39w0d who is being admitted for elective induction of labor. By issue:  Fetal arrhythmia affecting pregnancy, antepartum  Assessment & Plan  No fetal dysrhythmia appreciated on US 11/14/23    39 weeks gestation of pregnancy  Assessment & Plan  Admit   T&S, CBC, RPR  CLD  IV fluids  GBS prophylaxis is not needed   Induction with FB and cytotec          Plan of care discussed with Dr. Татьяна David. This patient will be an INPATIENT and I certify the anticipated length of stay is >2 Midnights. History of Present Illness     Chief Complaint: "induction of labor"    History of Present Illness:  Tam Velazco is a 29 y.o. Yoli Noble with an EDITA of 11/21/2023, by Last Menstrual Period at 39w0d weeks gestation who presents for elective induction of labor. Overall, she feels well and is a little anxious about the birthing process. She is feeling her baby move and was seen at her prenatal appt this morning without concern other than potential fetal arrhythmia. She has completed all her relevant prenatal testing, labs and vaccines. She has a history of seasonal allergies, generalized anxiety disorder and focal nodular hyperplasia of her liver. She takes no medications other than her prenatal vitamin, vitamin D and probiotics. She does have an epipen as she is allergic to Bee stings but has not had to use it in a long time. She has a family history of  thromboembolic disease but the pt herself has tested negative on her thrombophilia labs. A thorough explanation was given to the pt regarding cervical ripening and progression into labor. Reiterated to pt that a vaginal birth is the goal of her induction but did explain relevant situations that may arise that may warrant a casserian section. Pt agreeable and understanding of the process moving forward.        Contractions: no  Loss of fluid: no  Vaginal bleeding: no although does admit to light spotting   Fetal movement: yes    ROS otherwise negative. Obstetrician: Dr. Chuy Manriquez MD    Pregnancy complications:   Fetal arrhythmia     OB History    Para Term  AB Living   1 0 0 0 0 0   SAB IAB Ectopic Multiple Live Births   0 0 0 0 0      # Outcome Date GA Lbr Ubaldo/2nd Weight Sex Delivery Anes PTL Lv   1 Current                Baby complications/comments: feta arrhythmia     Review of Systems  12-point ROS negative unless stated in HPI. 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 Past Surgical History:   Procedure Laterality Date    KNEE SURGERY      LASIK      TONSILLECTOMY  age 16    WISDOM TOOTH EXTRACTION       Social History   Social History     Substance and Sexual Activity   Alcohol Use Not Currently    Comment: Social     Social History     Substance and Sexual Activity   Drug Use No     Social History     Tobacco Use   Smoking Status Never   Smokeless Tobacco Never     Family History: non-contributory    Meds/Allergies      Medications Prior to Admission   Medication    cholecalciferol (VITAMIN D3) 25 mcg (1,000 units) tablet    Prenatal Vit-Fe Fumarate-FA (PRENATAL PO)    Probiotic Product (PROBIOTIC DAILY PO)    EPINEPHrine (EPIPEN) 0.3 mg/0.3 mL SOAJ        Allergies   Allergen Reactions    Levofloxacin Hives       Objective:  Vitals: Blood pressure 118/80, pulse 85, temperature 97.9 °F (36.6 °C), temperature source Oral, resp. rate 18, height 5' 7" (1.702 m), weight 85.3 kg (188 lb), last menstrual period 2023. Body mass index is 29.44 kg/m².      Physical Exam  GEN: alert and oriented x 3, no apparent distress, appears well  CARDIAC: RRR, no murmurs rubs or gallops   PULMONARY: non labored breathing  ABDOMEN: gravid, soft, no tenderness  EXTREMITIES: nontender,  edema  FETAL ASSESSMENT:  Fetal heart rate: 130/moderate variability/15x15 accelerations/0 decelerations; Category 1  Medanales: no contractions seen    Prenatal Labs:   Blood type: A+  Antibody screen: positive  HIV: non-reactive  Hepatitis B surface antigen: non-reactive  Hepatitis C antibody: non-reactive  Syphilis screening: negative  Gonorrhea/Chlamydia: negative/negative  Rubella: Immune  Varicella: Unknown  Urine culture: <1000cfu/mL  1 hour GTT: 115  3 hour GTT: n/a  Group B strep: negative  Last Hemoglobin: 11.9g/dL  Last Platelet count: 137I    Invasive Devices       None                   John Holden MD  Obstetrics & Gynecology, PGY-2  11/14/2023, 9:37 PM

## 2023-11-15 NOTE — OB LABOR/OXYTOCIN SAFETY PROGRESS
Oxytocin Safety Progress Check Note - Jon Farmer 29 y.o. female MRN: [id number]    Unit/Bed#: L&D 326-01 Encounter: [id number]    Dose (lucio-units/min) Oxytocin: 4 lucio-units/min  Contraction Frequency (minutes): 5-8  Contraction Quality: Mild  Tachysystole: No   Cervical Dilation: 4        Cervical Effacement: 70  Fetal Station: -2  Baseline Rate: 125 bpm     FHR Category: I               Vital Signs:   Vitals:    11/15/23 0432   BP: 117/74   Pulse: 58   Resp:    Temp:        Notes/comments:   Pitocin started at 0350, SVE deferred. FHT Cat I. Continue to monitor.        Blanca Wright MD 11/15/2023 5:55 AM

## 2023-11-15 NOTE — OB LABOR/OXYTOCIN SAFETY PROGRESS
Oxytocin Safety Progress Check Note - Sofia Frey 29 y.o. female MRN: [id number]    Unit/Bed#: L&D 326-01 Encounter: [id number]    Dose (lucio-units/min) Oxytocin: 14 lucio-units/min  Contraction Frequency (minutes): 2-3  Contraction Quality: Mild  Tachysystole: No   Cervical Dilation: 4        Cervical Effacement: 80  Fetal Station: -1  Baseline Rate: 135 bpm     FHR Category: II               Vital Signs:   Vitals:    11/15/23 1220   BP: 91/52   Pulse: 62   Resp:    Temp:        Notes/comments:   Patient resting comfortably on exam, sitting in high Fowlers. FHR tracing noted to be category II with late decelerations. Decreasing pitocin dose from 14 to 10 lucio-units/min, giving fluid bolus, and position changes. Patient comfortable with epidural. Reassess tracing after resuscitative efforts for continued reduction in pitocin if needed.     Marcia Coello MD 11/15/2023 12:53 PM

## 2023-11-15 NOTE — L&D DELIVERY NOTE
Vaginal Delivery Summary - OB/GYN   Yeny Mares 29 y.o. female MRN: [id number]  Unit/Bed#: L&D 326-01 Encounter: [id number]    Pre-delivery Diagnosis:   Pregnancy at 39w1d  Fetal arrhythmia affecting pregnancy  Family history of thromboembolic disease    Post-delivery Diagnosis: same, delivered    Procedure: Spontaneous Vaginal Delivery, repair of first degree perineal laceration    OBGYN Practice: [facility] (Dr. Julio Cesar Salvador)    Attending Physician: Dr. Jessica Dang  Resident Physician: Dr. Sarabia    Anesthesia: Epidural ,     QBL: Non-Surgical QBL (mL): 69        Complications: none apparent    Specimens:   1. Arterial and venous cord gases  2. Cord blood  3. Segment of umbilical cord  4. Placenta to storage     Findings:  1. Viable female  on 11/15/2023  3:32 PM  via Vaginal, Spontaneous   with APGAR scores of   and   at 1 and 5 minutes, respectively. Weight pending at time of dictation for skin to skin bonding. 2. Spontaneous delivery of intact placenta at 1535  3. 1 degree laceration repaired with 3-0 Vicryl Rapide      Gases:  Umbilical Artery  Recent Labs     11/15/23  1532   PHCART 7.274   BECART -2.9*       Umbilical Vein  Recent Labs     11/15/23  1532   PHCVEN 7.441   BECVEN -0.5*         Brief history and labor course:  Yeny Mares is a 29 y.o. [id number] 39w1d . She presented to labor and delivery for elective induction. Her pregnancy was complicated by possible fetal arrhythmia. On exam in triage she was noted to be 1/60/-2. She was induced with a roman balloon and cytotec and advanced to pitocin. She received an epidural for pain management. Amniotomy was performed for clear fluid. She had intermittent late decelerations intrapartum that resolved with position changes and fluid bolus. She progressed to complete and began pushing.     Description of delivery:    After pushing for 10 minutes, Jj delivered a viable female , weight pending as mother is doing skin to skin bonding. The fetal vertex delivered HERSON position spontaneously. There was a tight nuchal cord that was reduced. The anterior shoulder delivered atraumatically with maternal expulsive forces and the assistance of gentle downward traction. The posterior shoulder delivered with maternal expulsive forces and the assistance of gentle upward traction. The remainder of the fetus delivered spontaneously. Upon delivery, the infant was placed on Jj's abdomen and the cord was doubly clamped and cut. Delayed cord clamping was achieved. The infant was noted to cry spontaneously and was moving all extremities appropriately. There was no evidence for injury. Awaiting nurse resuscitators evaluated the . Arterial and venous cord blood gases and cord blood was collected for analysis. These were promptly sent to the lab. In the immediate post-partum, active management of the 3rd stage of labor was performed with massage, the administration of 30 units of IV pitocin, and gentle traction on the umbilical cord. The placenta delivered spontaneously and was noted to have a centrally inserted 3 vessel cord. The placenta was sent to storage. Laceration Repair  The vagina, cervix, perineum, and rectum were inspected and there was noted to be a first degree perineal laceration. The patient was comfortable with epidural for analgesia. The vaginal mucosa and submucosa were then re approximated using 3-0 vicryl rapide to the point of the hymenal ring and then in a subcuticular fashion. At the conclusion of the procedure, all needle, sponge, and instrument counts were noted to be correct. Dr. Airam Dominique was present and participated in all key portions of the case.     Disposition:  The patient and the  both tolerated the procedure well and are recovering in labor and delivery room       Gera Frost MD  PGY 1, Obstetrics and Gynecology  11/15/2023  4:11 PM

## 2023-11-15 NOTE — ANESTHESIA POSTPROCEDURE EVALUATION
Post-Op Assessment Note    CV Status:  Stable  Pain Score: 1    Pain management: adequate      Post-op block assessment: no complications and catheter intact   Mental Status:  Alert and awake   Hydration Status:  Euvolemic   PONV Controlled:  Controlled   Airway Patency:  Patent     Post Op Vitals Reviewed: Yes    No anethesia notable event occurred.     Staff: Anesthesiologist               BP      Temp      Pulse     Resp      SpO2      /63   Pulse 71   Temp 97.7 °F (36.5 °C) (Oral)   Resp 18   Ht 5' 7" (1.702 m)   Wt 85.3 kg (188 lb)   LMP 02/14/2023 (Exact Date)   BMI 29.44 kg/m²

## 2023-11-15 NOTE — OB LABOR/OXYTOCIN SAFETY PROGRESS
Oxytocin Safety Progress Check Note - Lynda Mcgrath 29 y.o. female MRN: [id number]    Unit/Bed#: L&D 326-01 Encounter: [id number]       Contraction Frequency (minutes): 5-8  Contraction Quality: Mild  Tachysystole: No   Cervical Dilation: 1        Cervical Effacement: 60  Fetal Station: -2  Baseline Rate: 125 bpm     FHR Category: I               Vital Signs:   Vitals:    11/14/23 2330   BP: 114/80   Pulse: 76   Resp:    Temp:        Notes/comments:   2h s/p cytotec placement, FB remains in place. FHT reactive. Continue to monitor.      Jaime Dorsey MD 11/15/2023 1:14 AM

## 2023-11-15 NOTE — ANESTHESIA PROCEDURE NOTES
Epidural Block    Patient location during procedure: OB/L&D  Start time: 11/15/2023 7:45 AM  Reason for block: at surgeon's request  Staffing  Performed by: Yulissa Montes De Oca DO  Authorized by: Yulissa Montes De Oca DO    Preanesthetic Checklist  Completed: patient identified, IV checked, site marked, risks and benefits discussed, surgical consent, monitors and equipment checked, pre-op evaluation and timeout performed  Epidural  Patient position: sitting  Prep: Betadine  Sedation Level: no sedation  Patient monitoring: frequent blood pressure checks and heart rate  Approach: midline  Location: lumbar, L3-4  Injection technique: KING air  Needle  Needle type: Tuohy   Needle gauge: 18 G  Catheter type: side hole  Catheter size: 20 G  Catheter securement method: tape  Test dose: negativeropivacaine (NAROPIN) 0.2% injection 10 mL - Epidural   10 mL - 11/15/2023 7:45:00 AM  Assessment  Sensory level: T10  Number of attempts: 1negative aspiration for CSF, negative aspiration for heme and no paresthesia on injection  patient tolerated the procedure well with no immediate complications

## 2023-11-15 NOTE — DISCHARGE INSTRUCTIONS
Self Care After Delivery   AMBULATORY CARE:   The postpartum period is the period of time from delivery to about 6 weeks. During this time you may experience many physical and emotional changes. It is important to understand what is normal and when you need to call your healthcare provider. It is also important to know how to care for yourself during this time. Call your local emergency number (911 in the [address]) for any of the following: You see or hear things that are not there, or have thoughts of harming yourself or your baby. You soak through 1 pad in 15 minutes, have blurry vision, clammy or pale skin, and feel faint. You faint or lose consciousness. You have trouble breathing. You cough up blood. Your  incision comes apart. Seek care immediately if:   Your heart is beating faster than usual.     You have a bad headache or changes in your vision. Your perineal tear, episiotomy site, or  incision is red, swollen, bleeding, or draining pus. You have severe abdominal pain. Call your doctor or obstetrician if:   Your leg is painful, red, and larger than usual.     You soak through 1 or more pads in an hour, or pass blood clots larger than a quarter from your vagina. You have a fever. You have new or worsening pain in your abdomen or vagina. You continue to have depression 1 to 2 weeks after you deliver. You have trouble sleeping. You have foul-smelling discharge from your vagina. You have pain or burning when you urinate. You do not have a bowel movement for 3 days or more. You have nausea or are vomiting. You have hard lumps or red streaks over your breasts. You have cracked nipples or bleed from your nipples. You have questions or concerns about your condition or care. Physical changes:  The following are normal changes after you give birth:  Pain in the area between your anus and vagina     Breast pain Constipation or hemorrhoids     Hot or cold flashes     Vaginal bleeding or discharge     Mild to moderate abdominal cramping     Difficulty controlling bowel movements or urine     Emotional changes: A drop in hormone levels after you deliver may cause changes in your emotions. You may feel irritable, sad, or anxious. You may cry easily or for no reason. You may also feel depressed. Depression that continues can be a sign of postpartum depression, a condition that can be treated. Treatment may include talk therapy, medicines, or both. Healthcare providers will ask how you are feeling and if you have any depression. These talks can happen during appointments for your medical care and for your baby's care, such as well child visits. Providers can help you find ways to care for yourself and your baby. Talk to your providers about the following:  When emotional changes or depression started, and if it is getting worse over time     Problems you are having with daily activities, sleep, or caring for your baby     If anything makes you feel worse, or makes you feel better     Feeling that you are not bonding with your baby the way you want     Any problems your baby has with sleeping or feeding     Your baby is fussy or cries a lot     Support you have from friends, family, or others     Breast care for breastfeeding mothers: You may have sore breasts for 3 to 6 days after you give birth. This happens as your milk begins to fill your breasts. You may also have sore breasts if you do not breastfeed frequently. Do the following to care for your breasts:  Apply a moist, warm, compress to your breast as directed. This may help soothe your breasts. Make sure the washcloth is not too hot before you apply it to your breast.     Nurse your baby or pump your milk frequently. This may prevent clogged milk ducts. Ask your healthcare provider how often to nurse or pump. Massage your breasts as directed.  This may help increase your milk flow. Gently rub your breasts in a circular motion before you breastfeed. You may need to gently squeeze your breast or nipple to help release milk. You can also use a breast pump to help release milk from your breast.     Wash your breasts with warm water only. Do not put soap on your nipples. Soap may cause your nipples to become dry. Apply lanolin cream to your nipples as directed. Lanolin cream may add moisture to your skin and prevent nipple dryness. Always wash off lanolin cream with warm water before you breastfeed. Place pads in your bra. Your nipples may leak milk when you are not breastfeeding. You can place pads inside of your bra to help prevent leaking onto your clothing. Ask your healthcare provider where to purchase bra pads. Get breastfeeding support if needed. Healthcare providers can answer questions about breastfeeding and provide you with support. Ask your healthcare provider who you can contact if you need breastfeeding support. Breast care for non-breastfeeding mothers: Milk will fill your breasts even if you bottle feed your baby. Do the following to help stop your milk from filling your breasts and causing pain:  Wear a bra with support at all times. A sports bra or a tight-fitting bra will help stop your milk from coming in. Apply ice on each breast for 15 to 20 minutes every hour or as directed. Use an ice pack, or put crushed ice in a plastic bag. Cover it with a towel before you apply it to your breast. Ice helps your milk ducts shrink. Keep your breasts away from warm water. Warm water will make it easier for milk to fill your breasts. Stand with your breasts away from warm water in the shower. Limit how much you touch your breasts. This will prevent them from filling with milk. Perineum care: Your perineum is the area between your rectum and vagina. It is normal to have swelling and pain in this area after you give birth.  If you had an episiotomy, your healthcare provider may give you special instructions. Clean your perineum after you use the bathroom. This may prevent infection and help with healing. Use a spray bottle with warm water to clean your perineum. You may also gently spray warm water against your perineum when you urinate. Always wipe front to back. Take a sitz bath as directed. A sitz bath may help relieve swelling and pain. Fill your bath tub or bucket with water up to your hips and sit in the water. Use cold water for 2 days after you deliver. Then use warm water. Ask your healthcare provider for more information about a sitz bath. Apply ice packs for the first 24 hours or as directed. Use a plastic glove filled with ice or buy an ice pack. Wrap the ice pack or plastic glove in a small towel or wash cloth. Place the ice pack on your perineum for 20 minutes at a time. Sit on a donut-shaped pillow. This may relieve pressure on your perineum when you sit. Use wipes that contain medicine or take pills as directed. Your healthcare provider may tell you to use witch hazel pads. You can place witch hazel pads in the refrigerator before you apply them to your perineum. Your provider may also tell you to take NSAIDs. Ask him or her how often to take pills or use the wipes. Do not go swimming or take tub baths for 4 to 6 weeks or as directed. This will help prevent an infection in your vagina or uterus. Bowel and bladder care: It may take 3 to 5 days to have a bowel movement after you deliver your baby. You can do the following to prevent or manage constipation, and get control of your bowel or bladder:  Take stool softeners as directed. A stool softener is medicine that will make your bowel movements softer. This may prevent or relieve constipation. A stool softener may also make bowel movements less painful. Drink plenty of liquids. Ask how much liquid to drink each day and which liquids are best for you. Liquids may help prevent constipation. Eat foods high in fiber. Examples include fruits, vegetables, grains, beans, and lentils. Ask your healthcare provider how much fiber you need each day. Fiber may prevent constipation. Do Kegel exercises as directed. Kegel exercises will help strengthen the muscles that control bowel movements and urination. Ask your healthcare provider for more information on Kegel exercises. Apply cold compresses or medicine to hemorrhoids as directed. This may relieve swelling and pain. Your healthcare provider may tell you to apply ice or wipes that contain medicine to your hemorrhoids. He or she may also tell you to use a sitz bath. Ask your provider for more information on how to manage hemorrhoids. Nutrition: Good nutrition is important in the postpartum period. It will help you return to a healthy weight, increase your energy levels, and prevent constipation. It will also help you get enough nutrients and calories if you are going to breastfeed your baby. Eat a variety of healthy foods. Healthy foods include fruits, vegetables, whole-grain breads, low-fat dairy products, beans, lean meats, and fish. You may need 500 to 700 extra calories each day if you breastfeed your baby. You may also need extra protein. Limit foods with added sugar and high amounts of fat. These foods are high in calories and low in healthy nutrients. Read food labels so you know how much sugar and fat is in the food you want to eat. Drink 8 to 10 glasses of water per day. Water will help you make plenty of milk for your baby. It will also help prevent constipation. Drink a glass of water every time you breastfeed your baby. Take vitamins as directed. Ask your healthcare provider what vitamins you need. Limit caffeine and alcohol if you are breastfeeding. Caffeine and alcohol can get into your breast milk. Caffeine and alcohol can make your baby fussy.  They can also interfere with your baby's sleep. Ask your healthcare provider if you can drink alcohol or caffeine. Rest and sleep: You may feel very tired in the postpartum period. Enough sleep will help you heal and give you energy to care for your baby. The following may help you get sleep and rest:  Nap when your baby naps. Your baby may nap several times during the day. Get rest during this time. Limit visitors. Many people may want to see you and your baby. Ask friends or family to visit on different days. This will give you time to rest.     Do not plan too much for one day. Put off household chores so that you have time to rest. Gradually do more each day. Ask for help from family, friends, or neighbors. Ask them to help you with laundry, cleaning, or errands. Also ask someone to watch the baby while you take a nap or relax. Ask your partner to help with the care of your baby. Pump some of your breast milk so your partner can feed your baby during the night. Exercise after delivery: Wait until your healthcare provider says it is okay to exercise. Exercise can help you lose weight, increase your energy levels, and manage your mood. It can also prevent constipation and blood clots. Start with gentle exercises such as walking. Do more as you have more energy. You may need to avoid abdominal exercises for 1 to 2 weeks after you deliver. Talk to your healthcare provider about an exercise plan that is right for you. Sexual activity after delivery:   Do not have sex until your healthcare provider says it is okay. You may need to wait 4 to 6 weeks before you have sex. This may prevent infection and allow time to heal.     Your menstrual cycle may begin as soon as 3 weeks after you deliver. Your period may be delayed if you breastfeed your baby. You can become pregnant before you get your first postpartum period. Talk to your healthcare provider about birth control that is right for you.  Some types of birth control are not safe during breastfeeding. For support and more information: Join a support group for new mothers. Ask for help from family and friends with chores, errands, and care of your baby. Office of [place],  Department of Health and Human Services  [address], [address] [facility] , [address] [facility]  [address], [address] [facility] , [address] [facility]  Phone: [phone]  Web Address: www.womenshealth.gov  [facility] Postpartum [address] [facility] , [address]  [address] , [address]  Web Address: [employer].com. org/pregnancy/postpartum-care. aspx  Follow up with your doctor or obstetrician as directed: You will need to follow up within 2 to 6 weeks of delivery. Write down your questions so you remember to ask them at your visits. © Copyright [facility] Information is for End User's use only and may not be sold, redistributed or otherwise used for commercial purposes. All illustrations and images included in CareNotes® are the copyrighted property of [employer] Inc. or [address] [place] [address]  The above information is an  only. It is not intended as medical advice for individual conditions or treatments. Talk to your doctor, nurse or pharmacist before following any medical regimen to see if it is safe and effective for you.

## 2023-11-15 NOTE — ANESTHESIA PREPROCEDURE EVALUATION
Procedure:  LABOR ANALGESIA    Relevant Problems   GI/HEPATIC   (+) Focal nodular hyperplasia of liver      GYN   (+) 39 weeks gestation of pregnancy   (+) Supervision of normal first pregnancy, antepartum      NEURO/PSYCH   (+) SALLIE (generalized anxiety disorder)        Physical Exam    Airway    Mallampati score: I         Dental       Cardiovascular  Rhythm: regular    Pulmonary   Breath sounds clear to auscultation    Other Findings        Anesthesia Plan  ASA Score- 2     Anesthesia Type- epidural with ASA Monitors. Additional Monitors:     Airway Plan:            Plan Factors-Exercise tolerance (METS): >4 METS. Chart reviewed. Existing labs reviewed. Patient summary reviewed. Patient is not a current smoker. Patient not instructed to abstain from smoking on day of procedure. Patient did not smoke on day of surgery. Obstructive sleep apnea risk education given perioperatively. Induction- intravenous. Postoperative Plan-     Informed Consent- Anesthetic plan and risks discussed with patient.

## 2023-11-15 NOTE — DISCHARGE SUMMARY
Discharge Summary - Algie Cogan 29 y.o. female MRN: [id number]    Unit/Bed#: L&D 326-01 Encounter: [id number]    ADMISSION  Admission Date: 2023   Admitting Attending: Dr. Clint Garay MD  Admitting Diagnoses:   Patient Active Problem List   Diagnosis    Seasonal allergies    Focal nodular hyperplasia of liver    SALLIE (generalized anxiety disorder)    Family history of thromboembolic disease    Family history of cardiovascular disease    Supervision of normal first pregnancy, antepartum    39 weeks gestation of pregnancy    Fetal arrhythmia affecting pregnancy, antepartum    Fetal heart rate/rhythm abnormality, antepartum       DELIVERY  Delivery Method: Vaginal, Spontaneous    Delivery Date and Time: 11/15/2023  3:32 PM   Delivery Attending: Dr. Marianne Cosme  Discharge Date: 23  Discharge Attending: ***  Discharge Diagnosis:   Same, Delivered    Clinical course: Admission to Delivery  Algie Cogan is a 29 y.o. Loida Love 39w1d . She presented to labor and delivery for elective induction. Her pregnancy was complicated by possible fetal arrhythmia. On exam in triage she was noted to be 1/60/-2. She was induced with a roman balloon and cytotec and advanced to pitocin. She received an epidural for pain management. Amniotomy was performed for clear fluid. She had intermittent late decelerations intrapartum that resolved with position changes and fluid bolus. She progressed to complete and began pushing. Reason for induction: Elective . Delivery  Route of Delivery: Vaginal, Spontaneous     Anesthesia: Epidural ,   QBL: Non-Surgical QBL (mL): 69        Delivery: Vaginal, Spontaneous  at 11/15/2023  3:32 PM   Laceration: Perineal: 1°  Repaired? Yes     Baby's Weight:  ;      Apgar scores:   and   at 1 and 5 minutes, respectively      Clinical Course: Post-Delivery:  The post delivery course was unremarkable.     On the day of discharge, the patient was ambulating, voiding spontaneously, tolerating oral intake, and hemodynamically stable. She was able to reasonably perform all ADLs. She had appropriate bowel function. Pain was well-controlled. She was discharged home on postpartum/postop day #1 without complications. Patient was instructed to follow up with her OB as an outpatient and was given appropriate warnings to call her provider with problems or concerns. Pertinent lab findings included:   Blood type A positive. Last three Hgb values:  Lab Results   Component Value Date    HGB 11.9 11/14/2023    HGB 11.7 08/02/2023    HGB 13.3 04/08/2023        Problem-specific follow-up plans included the following:  Problem List       Seasonal allergies    Focal nodular hyperplasia of liver    SALLIE (generalized anxiety disorder)    Family history of thromboembolic disease    Overview     Thrombophilia labs: negative         Family history of cardiovascular disease    Supervision of normal first pregnancy, antepartum    Overview       CF/ SMA testing: negative  Flu vaccine:  completed  Covid vaccine:  completed X 2           39 weeks gestation of pregnancy    Current Assessment & Plan     Admit   T&S, CBC, RPR  CLD  IV fluids  GBS prophylaxis is not needed   Induction with FB and cytotec           Fetal arrhythmia affecting pregnancy, antepartum    Current Assessment & Plan     No fetal dysrhythmia appreciated on US 11/14/23         Fetal heart rate/rhythm abnormality, antepartum        Discharge med list:  Contraception: unsure     Medication List      ASK your doctor about these medications     cholecalciferol 25 mcg (1,000 units) tablet; Commonly known as: VITAMIN   D3   EPINEPHrine 0.3 mg/0.3 mL Soaj; Commonly known as: Andrew Andersone;  Inject 0.3 mL   (0.3 mg total) into a muscle once for 1 dose   PRENATAL PO   PROBIOTIC DAILY PO       Condition at discharge:   stable     Disposition:   Home    Planned Readmission:   No tablet (600   mg total) by mouth every 6 (six) hours as needed for mild pain or moderate   pain     CONTINUE taking these medications     cholecalciferol 25 mcg (1,000 units) tablet; Commonly known as: VITAMIN   D3   EPINEPHrine 0.3 mg/0.3 mL Soaj; Commonly known as: Floydene [place];  Inject 0.3 mL   (0.3 mg total) into a muscle once for 1 dose   PRENATAL PO   PROBIOTIC DAILY PO       Condition at discharge:   stable     Disposition:   Home    Planned Readmission:   No    Patrica Moran MD  PGY 1, Obstetrics and Gynecology

## 2023-11-15 NOTE — OB LABOR/OXYTOCIN SAFETY PROGRESS
Oxytocin Safety Progress Check Note - Jorge Marr 29 y.o. female MRN: [id number]    Unit/Bed#: L&D 326-01 Encounter: [id number]       Contraction Frequency (minutes): 5-8  Contraction Quality: Mild  Tachysystole: No   Cervical Dilation: 4        Cervical Effacement: 70  Fetal Station: -2  Baseline Rate: 125 bpm     FHR Category: I               Vital Signs:   Vitals:    11/14/23 2330   BP: 114/80   Pulse: 76   Resp:    Temp:        Notes/comments:   FB out, SVE as above. FHT Cat I. Plan to start pitocin.       Yenny Santos MD 11/15/2023 3:11 AM

## 2023-11-15 NOTE — ASSESSMENT & PLAN NOTE
QBL 69 mL  Hgb 11.9  Pain well controlled  Voiding spontaneously  Appropriate bowel function  Tolerating PO fluids and solids  Breastfeeding  Lochia wnl  Postpartum contraception: unsure  Continue routine postpartum care

## 2023-11-15 NOTE — OB LABOR/OXYTOCIN SAFETY PROGRESS
Oxytocin Safety Progress Check Note - Elvira Luu 29 y.o. female MRN: [id number]    Unit/Bed#: L&D 326-01 Encounter: [id number]                 Cervical Dilation: 1        Cervical Effacement: 61  Fetal Station: -2        FHR Category: I               Vital Signs:   Vitals:    11/14/23 2028   BP: 118/80   Pulse: 85   Resp: 18   Temp: 97.9 °F (36.6 °C)       Notes/comments:   Roman Balloon Induction Procedure    Reason for induction: elective. Induction plan previously discussed with Dr. Kevyn Haines. Procedural risks reviewed, consent obtained. The patient was placed in dorsal lithotomy position. Using Sterile technique, a 24F roman balloon was advanced beyond the internal cervix os and inflated with 60cc LR. The catheter was clamped with an umbilical cord clamp and instructions were given to the patient's nurse to weight the balloon with a 1-L bag of LR. Cytotec 25mcg was then placed in the posterior fornix of the vagina. The procedure was uncomplicated and the patient tolerated the procedure well.       Franny Reno MD 11/14/2023 10:32 PM

## 2023-11-16 VITALS
BODY MASS INDEX: 29.51 KG/M2 | DIASTOLIC BLOOD PRESSURE: 73 MMHG | HEIGHT: 67 IN | HEART RATE: 71 BPM | SYSTOLIC BLOOD PRESSURE: 122 MMHG | WEIGHT: 188 LBS | TEMPERATURE: 98.1 F | RESPIRATION RATE: 18 BRPM

## 2023-11-16 PROCEDURE — 3E02340 INTRODUCTION OF INFLUENZA VACCINE INTO MUSCLE, PERCUTANEOUS APPROACH: ICD-10-PCS | Performed by: OBSTETRICS & GYNECOLOGY

## 2023-11-16 PROCEDURE — NC001 PR NO CHARGE: Performed by: OBSTETRICS & GYNECOLOGY

## 2023-11-16 PROCEDURE — 90686 IIV4 VACC NO PRSV 0.5 ML IM: CPT

## 2023-11-16 PROCEDURE — 90471 IMMUNIZATION ADMIN: CPT

## 2023-11-16 PROCEDURE — 99024 POSTOP FOLLOW-UP VISIT: CPT | Performed by: OBSTETRICS & GYNECOLOGY

## 2023-11-16 RX ORDER — IBUPROFEN 600 MG/1
600 TABLET ORAL EVERY 6 HOURS PRN
Qty: 30 TABLET | Refills: 0
Start: 2023-11-16

## 2023-11-16 RX ORDER — ACETAMINOPHEN 325 MG/1
650 TABLET ORAL EVERY 6 HOURS PRN
Refills: 0
Start: 2023-11-16

## 2023-11-16 RX ADMIN — IBUPROFEN 600 MG: 600 TABLET ORAL at 07:04

## 2023-11-16 RX ADMIN — BENZOCAINE AND LEVOMENTHOL 1 APPLICATION: 200; 5 SPRAY TOPICAL at 16:13

## 2023-11-16 RX ADMIN — INFLUENZA VIRUS VACCINE 0.5 ML: 15; 15; 15; 15 SUSPENSION INTRAMUSCULAR at 09:21

## 2023-11-16 RX ADMIN — IBUPROFEN 600 MG: 600 TABLET ORAL at 00:11

## 2023-11-16 RX ADMIN — IBUPROFEN 600 MG: 600 TABLET ORAL at 15:28

## 2023-11-16 RX ADMIN — SENNOSIDES 8.6 MG: 8.6 TABLET, FILM COATED ORAL at 09:20

## 2023-11-16 RX ADMIN — WITCH HAZEL 1 PAD: 500 SOLUTION RECTAL; TOPICAL at 16:13

## 2023-11-16 NOTE — PROGRESS NOTES
Progress Note - OB/GYN  Marques Recinos 29 y.o. female MRN: [id number]  Unit/Bed#: L&D 308-01 Encounter: [id number]    Assessment and Plan     Marques Recinos is a patient of: [facility]. She is PPD# 1 s/p  spontaneous vaginal delivery  Recovering well and is stable       *  (spontaneous vaginal delivery)  Assessment & Plan  QBL 69 mL  Hgb 11.9  Pain well controlled  Voiding spontaneously  Appropriate bowel function  Tolerating PO fluids and solids  Breastfeeding  Lochia wnl  Postpartum contraception: unsure  Continue routine postpartum care    Fetal arrhythmia affecting pregnancy, antepartum  Assessment & Plan  No fetal dysrhythmia appreciated on US 23        Disposition    - Anticipate discharge home on PPD# 1-2      Subjective/Objective     Chief Complaint: Postpartum State     Subjective:    Marques Recinos is PPD/POD#1 s/p  spontaneous vaginal delivery. She has no current complaints and is interested in obtaining flu shot before discharge. Pain is well controlled. Patient is currently voiding. She is ambulating. Patient is currently passing flatus and has had no bowel movement. She is tolerating PO, and denies nausea or vomitting. Patient denies fever, chills, chest pain, shortness of breath, or calf tenderness. Lochia is normal. She is  Breastfeeding. She is recovering well and is stable.        Vitals:   /68 (BP Location: Left arm)   Pulse 61   Temp 97.9 °F (36.6 °C) (Oral)   Resp 16   Ht 5' 7" (1.702 m)   Wt 85.3 kg (188 lb)   LMP 2023 (Exact Date)   Breastfeeding Yes   BMI 29.44 kg/m²       Intake/Output Summary (Last 24 hours) at 2023 0635  Last data filed at 2023 0301  Gross per 24 hour   Intake --   Output 2819 ml   Net -2819 ml       Invasive Devices       Peripheral Intravenous Line  Duration             Peripheral IV 23 Distal;Left;Ventral (anterior) Forearm 1 day                    Physical Exam:   GEN: Marques Recinos appears well, alert and oriented x 3, pleasant and cooperative   CARDIO: RRR, no murmurs or rubs  RESP:  CTAB, no wheezes or rales  ABDOMEN: soft, no tenderness, no distention, fundus @ 1 cm below umbilicus. EXTREMITIES: SCDs on, non tender, no erythema, b/l Simona's sign negative      Labs:     Hemoglobin   Date Value Ref Range Status   11/14/2023 11.9 11.5 - 15.4 g/dL Final   08/02/2023 11.7 11.5 - 15.4 g/dL Final   07/25/2016 12.8  Final     Comment:       Performed at: In Office  ,       WBC   Date Value Ref Range Status   11/14/2023 9.71 4.31 - 10.16 Thousand/uL Final   08/02/2023 8.02 4.31 - 10.16 Thousand/uL Final     Platelets   Date Value Ref Range Status   11/14/2023 196 149 - 390 Thousands/uL Final   08/02/2023 222 149 - 390 Thousands/uL Final     Creatinine   Date Value Ref Range Status   10/21/2022 0.87 0.60 - 1.30 mg/dL Final     Comment:     Standardized to IDMS reference method   02/28/2022 0.84 0.60 - 1.30 mg/dL Final     Comment:     Standardized to IDMS reference method     AST   Date Value Ref Range Status   10/21/2022 15 5 - 45 U/L Final     Comment:     Specimen collection should occur prior to Sulfasalazine administration due to the potential for falsely depressed results. 02/28/2022 11 5 - 45 U/L Final     Comment:     Specimen collection should occur prior to Sulfasalazine administration due to the potential for falsely depressed results. 12/15/2018 13 10 - 30 U/L Final     ALT   Date Value Ref Range Status   10/21/2022 24 12 - 78 U/L Final     Comment:     Specimen collection should occur prior to Sulfasalazine and/or Sulfapyridine administration due to the potential for falsely depressed results. 02/28/2022 18 12 - 78 U/L Final     Comment:     Specimen collection should occur prior to Sulfasalazine and/or Sulfapyridine administration due to the potential for falsely depressed results.     12/15/2018 13 6 - 29 U/L Final          Selvin Hahn MD  11/16/2023  6:35 AM

## 2023-11-16 NOTE — LACTATION NOTE
This note was copied from a baby's chart. CONSULT - LACTATION  Baby Girl (Geetha Moss) Kar Gould 1 days female MRN: [id number]    [address] [place] [address] NURSERY Room / Bed: L&D 308(N)/L&D 308(N) Encounter: [id number]    Maternal Information     MOTHER:  Tony Lawrence  Maternal Age: 29 y.o.   OB History: # 1 - Date: 11/15/23, Sex: Female, Weight: 3275 g (7 lb 3.5 oz), GA: 39w1d, Delivery: Vaginal, Spontaneous, Apgar1: 6, Apgar5: 9, Living: Living, Birth Comments: None   Previouse breast reduction surgery? No    Lactation history:   Has patient previously breast fed: No   How long had patient previously breast fed:     Previous breast feeding complications:       Past Surgical History:   Procedure Laterality Date    KNEE SURGERY      LASIK      TONSILLECTOMY  age 16    WISDOM TOOTH EXTRACTION          Birth information:  YOB: 2023   Time of birth: 3:32 PM   Sex: female   Delivery type: Vaginal, Spontaneous   Birth Weight: 3275 g (7 lb 3.5 oz)   Percent of Weight Change: 0%     Gestational Age: 37w4d   [unfilled]    Assessment     Breast and nipple assessment: normal assessment     Assessment: normal assessment    Feeding assessment: feeding well  LATCH:  Latch: Grasps breast, tongue down, lips flanged, rhythmic sucking   Audible Swallowing: Spontaneous and intermittent (24 hours old)   Type of Nipple: Everted (After stimulation)   Comfort (Breast/Nipple): Soft/non-tender   Hold (Positioning): No assist from staff, mother able to position/hold infant   LATCH Score: 10        Having latch problems? No   Position(s) Used Cross Cradle   Breasts/Nipples   Breastfeeding Status Yes   Breastfeeding Progress Breastfeeding well   Breast Pump   Pump 3  (Has a Spectra S2)   Patient Follow-Up   Lactation Consult Status 2   Follow-Up Type Inpatient;Call as needed   Other OB Lactation Documentation    Additional Problem Noted Geetha Moss had Dai Linear latched to the breast upon entering the room. 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 Her aunt is a lactation consultant in [place]. Discussed properties and how to accomplish deep latch. Walker Pearson denies needs for lactation services at this time. (RSB and D/C booklets at bedside.)       Feeding recommendations:  breast feed on demand    Gently compress the breast as if offering a sandwich with your fingers and thumb in parallel with Baby Girl's lips. Bring Baby Girl to the breast so that her lower lip and chin touch the breast with her nose just above the nipple. Met with mother. Provided mother with Ready, Set, Baby booklet which contained information on:  Hand expression with access to QR codes to review hand expression. Positioning and latch reviewed as well as showing images of other feeding positions. Discussed the properties of a good latch in any position. Feeding on cue and what that means for recognizing infant's hunger, s/s that baby is getting enough milk and some s/s that breastfeeding dyad may need further help  Skin to Skin contact and benefits to mom and baby  Avoidance of pacifiers for the first month discussed. Gave information on common concerns, what to expect the first few weeks after delivery, preparing for other caregivers, and how partners can help. Resources for support also provided. Encouraged parents to call for assistance, questions, and concerns about breastfeeding. Extension provided.       Dave Arceo RN 11/16/2023 10:38 AM

## 2023-11-16 NOTE — PLAN OF CARE
Problem: POSTPARTUM  Goal: Experiences normal postpartum course  Description: INTERVENTIONS:  - Monitor maternal vital signs  - Assess uterine involution and lochia  Outcome: Completed  Goal: Appropriate maternal -  bonding  Description: INTERVENTIONS:  - Identify family support  - Assess for appropriate maternal/infant bonding   -Encourage maternal/infant bonding opportunities  - Referral to  or  as needed  Outcome: Completed  Goal: Establishment of infant feeding pattern  Description: INTERVENTIONS:  - Assess breast/bottle feeding  - Refer to lactation as needed  Outcome: Completed  Goal: Incision(s), wounds(s) or drain site(s) healing without S/S of infection  Description: INTERVENTIONS  - Assess and document dressing, incision, wound bed, drain sites and surrounding tissue  - Provide patient and family education  Outcome: Completed

## 2023-11-17 NOTE — UTILIZATION REVIEW
MOTHER AND BABY DISCHARGED     NOTIFICATION OF INPATIENT ADMISSION   MATERNITY/DELIVERY AUTHORIZATION REQUEST   SERVICING FACILITY:   [address] [place] [address] - L&D, [place], NICU  [address]  Tax ID: [id number]  NPI: [id number] ATTENDING PROVIDER:  Attending Name and NPI#: Melanie Jauregui Md [[id number]]  Address: [address]  Phone: [phone]     ADMISSION INFORMATION:  [facility]   Place of Service Code: 21  Inpatient Admission Date/Time: 23  7:57 PM  Discharge Date/Time: 2023  5:39 PM  Admitting Diagnosis Code/Description:  Encounter for full-term uncomplicated delivery [J33]   Mother: Jon Farmer 1995 Estimated Date of Delivery: 23  Delivering clinician: Melanie Jauregui    OB History          1    Para   1    Term   1       0    AB   0    Living   1         SAB   0    IAB   0    Ectopic   0    Multiple   0    Live Births   1               [place] Name & MRN:   Information for the patient's :  [place] Girl Kimi Bergman) [[id number]]    Delivery Information:  Sex: female  Delivered 11/15/2023 3:32 PM by Vaginal, Spontaneous; Gestational Age: 37w4d    [place] Measurements:  Weight: 7 lb 3.5 oz (3275 g); Height: 20"    APGAR 1 minute 5 minutes 10 minutes   Totals: 6 9       Birth Information: 29 y.o. female MRN: [id number] Unit/Bed#: L&D 308-01   Birthweight: No birth weight on file. Gestational Age: <None> Delivery Type:    APGARS Totals:        UTILIZATION REVIEW CONTACT:  Ashli Avis, Utilization   Network Utilization Review Department  Phone: [phone]  Fax [phone]  Email: [email or web address]  Contact for approvals/pending authorizations, clinical reviews, and discharge.    PHYSICIAN ADVISORY SERVICES:  Medical Necessity Denial & Jqth-np-Vuxy Review  Phone: [phone] Fax: [phone]  Email: [email or web address]   DISCHARGE SUPPORT TEAM:  For Patients Discharge Needs & Updates  Phone: [phone] opt. 2 Fax: [phone]  Email: [email or web address]

## 2023-11-20 ENCOUNTER — TELEPHONE (OUTPATIENT)
Age: 28
End: 2023-11-20

## 2023-11-20 NOTE — TELEPHONE ENCOUNTER
Phone call to patient to see how she is doing s/p . Patient says she and baby are doing well without c/o. She will keep appt 23 as planned but call sooner should any concerns arise.

## 2023-11-23 LAB — PLACENTA IN STORAGE: NORMAL

## 2023-12-08 ENCOUNTER — POSTPARTUM VISIT (OUTPATIENT)
Dept: OBGYN CLINIC | Facility: CLINIC | Age: 28
End: 2023-12-08

## 2023-12-08 VITALS
WEIGHT: 166.8 LBS | SYSTOLIC BLOOD PRESSURE: 116 MMHG | BODY MASS INDEX: 26.18 KG/M2 | DIASTOLIC BLOOD PRESSURE: 72 MMHG | HEART RATE: 77 BPM | HEIGHT: 67 IN

## 2023-12-08 PROBLEM — O36.8390 FETAL ARRHYTHMIA AFFECTING PREGNANCY, ANTEPARTUM: Status: RESOLVED | Noted: 2023-10-09 | Resolved: 2023-12-08

## 2023-12-08 PROBLEM — O36.8390 FETAL HEART RATE/RHYTHM ABNORMALITY, ANTEPARTUM: Status: RESOLVED | Noted: 2023-10-10 | Resolved: 2023-12-08

## 2023-12-08 PROCEDURE — 99024 POSTOP FOLLOW-UP VISIT: CPT | Performed by: OBSTETRICS & GYNECOLOGY

## 2023-12-08 NOTE — PROGRESS NOTES
Subjective       Chief Complaint   Patient presents with   • Postpartum Care     PPD 2, negative substance use        Ziyad Sifuentes is a 29 y.o. female who presents for a postpartum visit. She is 3 weeks postpartum following a spontaneous vaginal delivery. I have fully reviewed the prenatal and intrapartum course. The delivery was at 44 1/7 gestational weeks. Outcome: spontaneous vaginal delivery. Anesthesia: none. Postpartum course has been normal. Baby's course has been normal, follows with cardiology. Baby is feeding by breast. Bleeding light bleeding. Bowel function is normal. Bladder function is normal. Patient is not sexually active. Contraception method is none. Postpartum depression screening: negative. The following portions of the patient's history were reviewed and updated as appropriate: allergies, current medications, past family history, past medical history, past social history, past surgical history, and problem list.    Postpartum Depression: Low Risk  (2023)    [place]  Depression Scale    • Last EPDS Total Score: 2    • Last EPDS Self Harm Result: Never       Last PAP: 22  GDM:  no      Review of Systems   Constitutional: Negative. HENT: Negative. Eyes: Negative. Respiratory: Negative. Cardiovascular: Negative. Gastrointestinal: Negative. Endocrine: Negative. Genitourinary:         As noted in HPI   Musculoskeletal: Negative. Skin: Negative. Allergic/Immunologic: Negative. Neurological: Negative. Hematological: Negative. Psychiatric/Behavioral: Negative. Objective     /72 (BP Location: Right arm, Patient Position: Sitting, Cuff Size: Adult)   Pulse 77   Ht 5' 7" (1.702 m)   Wt 75.7 kg (166 lb 12.8 oz)   LMP 2023 (Exact Date)   Breastfeeding Yes   BMI 26.12 kg/m²    General:  alert and oriented, in no acute distress       Assessment/Plan     3 weeks postpartum exam      1. Contraception: condoms  2. Follow up in: 3 weeks or as needed.       Problem List Items Addressed This Visit          Other     (spontaneous vaginal delivery) - Primary

## 2023-12-27 NOTE — PROGRESS NOTES
"Subjective       Chief Complaint   Patient presents with    Postpartum Care     PPD: 2       Jj Meier is a 28 y.o. female who presents for a postpartum visit. She is 6 weeks postpartum following a spontaneous vaginal delivery. I have fully reviewed the prenatal and intrapartum course. The delivery was at 39.1 gestational weeks. Outcome: spontaneous vaginal delivery. Anesthesia: epidural. Postpartum course has been normal. Baby's course has been normal. Baby is feeding by breast. Bleeding no bleeding. Bowel function is normal. Bladder function is normal. Patient is not sexually active. Contraception method is none. Postpartum depression screening: negative.    The following portions of the patient's history were reviewed and updated as appropriate: allergies, current medications, past family history, past medical history, past social history, past surgical history, and problem list.    Postpartum Depression: Low Risk  (2023)    [place]  Depression Scale     Last EPDS Total Score: 2     Last EPDS Self Harm Result: Never       Last PAP 22 WNL  GDM:  no      Review of Systems   Constitutional: Negative.    HENT: Negative.     Eyes: Negative.    Respiratory: Negative.     Cardiovascular: Negative.    Gastrointestinal: Negative.    Endocrine: Negative.    Genitourinary:         As noted in HPI   Musculoskeletal: Negative.    Skin: Negative.    Allergic/Immunologic: Negative.    Neurological: Negative.    Hematological: Negative.    Psychiatric/Behavioral: Negative.           Objective     /80 (BP Location: Right arm, Patient Position: Sitting, Cuff Size: Adult)   Pulse 69   Ht 5' 7\" (1.702 m)   Wt 73.9 kg (163 lb)   LMP 2023 (Exact Date)   Breastfeeding Yes   BMI 25.53 kg/m²    General:  alert and oriented, in no acute distress   Abdomen: soft, non-tender; bowel sounds normal; no masses,  no organomegaly    Vulva:  normal   Vagina: normal vagina, no discharge, " exudate, lesion, or erythema   Cervix:  no cervical motion tenderness   Corpus: normal size, contour, position, consistency, mobility, non-tender   Adnexa:  no mass, fullness, tenderness       Assessment/Plan     6 weeks postpartum exam      1. Contraception: condoms  2. Follow up in: 3 months or as needed.      Problem List Items Addressed This Visit          Other     (spontaneous vaginal delivery) - Primary     Other Visit Diagnoses       Postpartum care and examination

## 2023-12-29 ENCOUNTER — POSTPARTUM VISIT (OUTPATIENT)
Dept: OBGYN CLINIC | Facility: CLINIC | Age: 28
End: 2023-12-29

## 2023-12-29 VITALS
HEIGHT: 67 IN | HEART RATE: 69 BPM | BODY MASS INDEX: 25.58 KG/M2 | DIASTOLIC BLOOD PRESSURE: 80 MMHG | SYSTOLIC BLOOD PRESSURE: 122 MMHG | WEIGHT: 163 LBS

## 2023-12-29 PROCEDURE — 99024 POSTOP FOLLOW-UP VISIT: CPT | Performed by: OBSTETRICS & GYNECOLOGY

## 2023-12-29 NOTE — LETTER
December 29, 2023     Patient: Jj Meeir  YOB: 1995  Date of Visit: 12/29/2023      To Whom it May Concern:    Jj Meier is under my professional care. Jj was seen in my office on 12/29/2023. Jj may return to work on 2/14/24 without restrictions .    If you have any questions or concerns, please don't hesitate to call.         Sincerely,          Stephanie Hall MD        CC: No Recipients

## 2024-01-30 ENCOUNTER — TELEPHONE (OUTPATIENT)
Dept: OBGYN CLINIC | Facility: CLINIC | Age: 29
End: 2024-01-30

## 2024-01-30 ENCOUNTER — DOCUMENTATION (OUTPATIENT)
Dept: OBGYN CLINIC | Facility: CLINIC | Age: 29
End: 2024-01-30

## 2024-01-30 DIAGNOSIS — O92.79 CLOGGED DUCT, POSTPARTUM: Primary | ICD-10-CM

## 2024-01-30 NOTE — TELEPHONE ENCOUNTER
Reviewed options with patient. pt will hold off for now but if it persists she will try the PT or u/s. Confirmed with patient and it is the right breast.

## 2024-01-30 NOTE — TELEPHONE ENCOUNTER
Patient calling in regards to having a clogged milk duct for the last week but noticing it more this week. Patient denies fevers and chills. Patient has tried pumping and feeding on that side to baby but has not had relief. Patient states it is not painful but feels like its bruised. Patient would like any other recommendations before coming in to be seen if possible. Please advise

## 2024-03-14 NOTE — PROGRESS NOTES
Assessment        Diagnoses and all orders for this visit:    Encntr for gyn exam (general) (routine) w/o abn findings    Family history of cardiovascular disease  -     Echo complete w/ contrast if indicated; Future  -     Ambulatory Referral to Cardiology; Future    Focal nodular hyperplasia of liver             Plan      All questions answered.  Contraception: condoms.  Discussed healthy lifestyle modifications.  Educational material distributed.  Follow up in 1 year.  Follow up as needed.   Pap deferred  Referral to cardiology due to family h/o cardiac disease, referred to cardiology      Subjective      Jj Ana Rosa Meier is a 28 y.o. female who presents for annual exam.      Chief Complaint   Patient presents with    Gynecologic Exam     She is 4 months postpartum  Family h/o cardiomyopathy  Cardiology referral placed due family h.o cardiomyopathy including, no CP no SOB  Her daughter has pulmonary artery stenosis and patent foramen ovale  She is breastfeeding    Last Pap: 22    Contraception: condoms  HPV vaccine completed:yes - 3 doses  History of abnormal Pap smear: yes - ASCUS  History of abnormal mammogram: no  Family history of uterine or ovarian cancer: no  Family history of breast cancer: no  Family history of colon cancer: no       OB History    Para Term  AB Living   1 1 1 0 0 1   SAB IAB Ectopic Multiple Live Births   0 0 0 0 1      # Outcome Date GA Lbr Ubaldo/2nd Weight Sex Delivery Anes PTL Lv   1 Term 11/15/23 39w1d / 00:30 3275 g (7 lb 3.5 oz) F Vag-Spont EPI N TAMIKA      Name: ADDIS,BABY GIRL (JJ)      Apgar1: 6  Apgar5: 9       Menstrual History:  OB History          1    Para   1    Term   1       0    AB   0    Living   1         SAB   0    IAB   0    Ectopic   0    Multiple   0    Live Births   1                Menarche age: 12  No LMP recorded.             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     Past Surgical History:   Procedure Laterality Date    KNEE SURGERY      LASIK      TONSILLECTOMY  age 17    WISDOM TOOTH EXTRACTION       Family History   Problem Relation Age of Onset    Coronary artery disease Father     Testicular cancer Father     Heart attack Father         Multiple    [place]'s disease Father     Leukemia Father     Cancer Father         Testicular, chest mass, leukemia    Deep vein thrombosis Father         One occurrence approximately 1990?    Heart disease Father     Prostate cancer Father     No Known Problems Sister     No Known Problems Maternal Grandmother     Lupus Paternal Grandfather     Ovarian cancer Other        Social History     Tobacco Use    Smoking status: Never    Smokeless tobacco: Never   Vaping Use    Vaping status: Never Used   Substance Use Topics    Alcohol use: Not Currently     Comment: Social    Drug use: No          Current Outpatient Medications:     cholecalciferol (VITAMIN D3) 25 mcg (1,000 units) tablet, , Disp: , Rfl:     Prenatal Vit-Fe Fumarate-FA (PRENATAL PO), Take by mouth, Disp: , Rfl:     Probiotic Product (PROBIOTIC DAILY PO), Take by mouth Gummy probiotic, Disp: , Rfl:     acetaminophen (TYLENOL) 325 mg tablet, Take 2 tablets (650 mg total) by mouth every 6 (six) hours as needed for mild pain or moderate pain (Patient not taking: Reported on 12/8/2023), Disp: , Rfl: 0    EPINEPHrine (EPIPEN) 0.3 mg/0.3 mL SOAJ, Inject 0.3 mL (0.3 mg total) into a muscle once for 1 dose (Patient not taking: Reported on 10/20/2023), Disp: 0.6 mL, Rfl: 2    ibuprofen (MOTRIN) 600 mg tablet, Take 1 tablet (600 mg total) by mouth every 6 (six) hours as needed for mild  "pain or moderate pain (Patient not taking: Reported on 12/8/2023), Disp: 30 tablet, Rfl: 0    Allergies   Allergen Reactions    Levofloxacin Hives           Review of Systems   Constitutional: Negative.    HENT: Negative.     Eyes: Negative.    Respiratory: Negative.     Cardiovascular: Negative.    Gastrointestinal: Negative.    Endocrine: Negative.    Genitourinary:         As noted in HPI   Musculoskeletal: Negative.    Skin: Negative.    Allergic/Immunologic: Negative.    Neurological: Negative.    Hematological: Negative.    Psychiatric/Behavioral: Negative.         /72 (BP Location: Right arm, Patient Position: Sitting, Cuff Size: Adult)   Pulse 79   Ht 5' 7\" (1.702 m)   Wt 73.2 kg (161 lb 6.4 oz)   BMI 25.28 kg/m²         Physical Exam  Constitutional:       Appearance: She is well-developed.   Genitourinary:      Vulva, bladder and rectum normal.      No lesions in the vagina.      Genitourinary Comments:         Right Labia: No rash, tenderness, lesions, skin changes or Bartholin's cyst.     Left Labia: No tenderness, lesions, skin changes, Bartholin's cyst or rash.     No inguinal adenopathy present in the right or left side.     No vaginal discharge, tenderness or bleeding.      No vaginal prolapse present.     No vaginal atrophy present.       Right Adnexa: not tender, not full and no mass present.     Left Adnexa: not tender, not full and no mass present.     No cervical motion tenderness, friability, lesion or polyp.      Uterus is not enlarged or tender.      Pelvic exam was performed with patient in the lithotomy position.   Rectum:      No external hemorrhoid.   Breasts:     Right: No mass, nipple discharge, skin change or tenderness.      Left: No mass, nipple discharge, skin change or tenderness.   HENT:      Head: Normocephalic.      Nose: Nose normal.   Eyes:      Conjunctiva/sclera: Conjunctivae normal.   Neck:      Thyroid: No thyromegaly.   Cardiovascular:      Rate and Rhythm: " Normal rate and regular rhythm.      Heart sounds: Normal heart sounds. No murmur heard.  Pulmonary:      Effort: Pulmonary effort is normal. No respiratory distress.      Breath sounds: Normal breath sounds. No wheezing or rales.   Abdominal:      General: There is no distension.      Palpations: Abdomen is soft. There is no mass.      Tenderness: There is no abdominal tenderness. There is no guarding or rebound.   Musculoskeletal:         General: No tenderness.      Cervical back: Neck supple. No muscular tenderness.   Lymphadenopathy:      Cervical: No cervical adenopathy.      Lower Body: No right inguinal adenopathy. No left inguinal adenopathy.   Neurological:      Mental Status: She is alert and oriented to person, place, and time.   Skin:     General: Skin is warm and dry.   Psychiatric:         Mood and Affect: Mood normal.         Behavior: Behavior normal.             No future appointments.

## 2024-03-15 ENCOUNTER — ANNUAL EXAM (OUTPATIENT)
Dept: OBGYN CLINIC | Facility: CLINIC | Age: 29
End: 2024-03-15
Payer: COMMERCIAL

## 2024-03-15 VITALS
HEART RATE: 79 BPM | WEIGHT: 161.4 LBS | BODY MASS INDEX: 25.33 KG/M2 | SYSTOLIC BLOOD PRESSURE: 104 MMHG | DIASTOLIC BLOOD PRESSURE: 72 MMHG | HEIGHT: 67 IN

## 2024-03-15 DIAGNOSIS — K76.89 FOCAL NODULAR HYPERPLASIA OF LIVER: ICD-10-CM

## 2024-03-15 DIAGNOSIS — Z01.419 ENCNTR FOR GYN EXAM (GENERAL) (ROUTINE) W/O ABN FINDINGS: Primary | ICD-10-CM

## 2024-03-15 DIAGNOSIS — Z82.49 FAMILY HISTORY OF CARDIOVASCULAR DISEASE: ICD-10-CM

## 2024-03-15 PROCEDURE — 99395 PREV VISIT EST AGE 18-39: CPT | Performed by: OBSTETRICS & GYNECOLOGY

## 2024-04-04 ENCOUNTER — APPOINTMENT (OUTPATIENT)
Dept: LAB | Facility: MEDICAL CENTER | Age: 29
End: 2024-04-04
Payer: COMMERCIAL

## 2024-04-04 ENCOUNTER — HOSPITAL ENCOUNTER (OUTPATIENT)
Dept: NON INVASIVE DIAGNOSTICS | Facility: HOSPITAL | Age: 29
Discharge: HOME | End: 2024-04-04
Attending: OBSTETRICS & GYNECOLOGY
Payer: COMMERCIAL

## 2024-04-04 VITALS
HEART RATE: 72 BPM | WEIGHT: 161.38 LBS | BODY MASS INDEX: 25.33 KG/M2 | HEIGHT: 67 IN | SYSTOLIC BLOOD PRESSURE: 104 MMHG | DIASTOLIC BLOOD PRESSURE: 72 MMHG

## 2024-04-04 DIAGNOSIS — Z82.49 FAMILY HISTORY OF CARDIOVASCULAR DISEASE: ICD-10-CM

## 2024-04-04 DIAGNOSIS — Z13.220 LIPID SCREENING: ICD-10-CM

## 2024-04-04 DIAGNOSIS — Z13.9 SCREENING FOR UNSPECIFIED CONDITION: ICD-10-CM

## 2024-04-04 DIAGNOSIS — Z82.49 FAMILY HISTORY OF ISCHEMIC HEART DISEASE: ICD-10-CM

## 2024-04-04 LAB
25(OH)D3 SERPL-MCNC: 59.5 NG/ML (ref 30–100)
ALBUMIN SERPL BCP-MCNC: 4.4 G/DL (ref 3.5–5)
ALP SERPL-CCNC: 67 U/L (ref 34–104)
ALT SERPL W P-5'-P-CCNC: 17 U/L (ref 7–52)
ANION GAP SERPL CALCULATED.3IONS-SCNC: 8 MMOL/L (ref 4–13)
AST SERPL W P-5'-P-CCNC: 17 U/L (ref 13–39)
BILIRUB SERPL-MCNC: 0.66 MG/DL (ref 0.2–1)
BUN SERPL-MCNC: 12 MG/DL (ref 5–25)
CALCIUM SERPL-MCNC: 9.3 MG/DL (ref 8.4–10.2)
CHLORIDE SERPL-SCNC: 103 MMOL/L (ref 96–108)
CHOLEST SERPL-MCNC: 187 MG/DL
CO2 SERPL-SCNC: 30 MMOL/L (ref 21–32)
CREAT SERPL-MCNC: 0.83 MG/DL (ref 0.6–1.3)
ERYTHROCYTE [DISTWIDTH] IN BLOOD BY AUTOMATED COUNT: 12.3 % (ref 11.6–15.1)
GFR SERPL CREATININE-BSD FRML MDRD: 96 ML/MIN/1.73SQ M
GLUCOSE P FAST SERPL-MCNC: 68 MG/DL (ref 65–99)
HCT VFR BLD AUTO: 42.6 % (ref 34.8–46.1)
HDLC SERPL-MCNC: 71 MG/DL
HGB BLD-MCNC: 14 G/DL (ref 11.5–15.4)
LDLC SERPL CALC-MCNC: 106 MG/DL (ref 0–100)
MCH RBC QN AUTO: 30.5 PG (ref 26.8–34.3)
MCHC RBC AUTO-ENTMCNC: 32.9 G/DL (ref 31.4–37.4)
MCV RBC AUTO: 93 FL (ref 82–98)
NONHDLC SERPL-MCNC: 116 MG/DL
PLATELET # BLD AUTO: 246 THOUSANDS/UL (ref 149–390)
PMV BLD AUTO: 10.3 FL (ref 8.9–12.7)
POTASSIUM SERPL-SCNC: 4.1 MMOL/L (ref 3.5–5.3)
PROT SERPL-MCNC: 7.3 G/DL (ref 6.4–8.4)
RBC # BLD AUTO: 4.59 MILLION/UL (ref 3.81–5.12)
SODIUM SERPL-SCNC: 141 MMOL/L (ref 135–147)
TRIGL SERPL-MCNC: 49 MG/DL
WBC # BLD AUTO: 4.92 THOUSAND/UL (ref 4.31–10.16)

## 2024-04-04 PROCEDURE — 93306 TTE W/DOPPLER COMPLETE: CPT

## 2024-04-04 PROCEDURE — 85027 COMPLETE CBC AUTOMATED: CPT

## 2024-04-04 PROCEDURE — 82306 VITAMIN D 25 HYDROXY: CPT

## 2024-04-04 PROCEDURE — 80053 COMPREHEN METABOLIC PANEL: CPT

## 2024-04-04 PROCEDURE — 80061 LIPID PANEL: CPT

## 2024-04-04 PROCEDURE — 36415 COLL VENOUS BLD VENIPUNCTURE: CPT

## 2024-04-05 LAB
BSA FOR ECHO PROCEDURE: 1.85 M2
SL CV LV EF: 58

## 2024-05-14 ENCOUNTER — OFFICE VISIT (OUTPATIENT)
Dept: CARDIOLOGY CLINIC | Facility: CLINIC | Age: 29
End: 2024-05-14
Payer: COMMERCIAL

## 2024-05-14 VITALS
WEIGHT: 158 LBS | HEART RATE: 80 BPM | HEIGHT: 67 IN | SYSTOLIC BLOOD PRESSURE: 110 MMHG | BODY MASS INDEX: 24.8 KG/M2 | DIASTOLIC BLOOD PRESSURE: 70 MMHG | OXYGEN SATURATION: 99 %

## 2024-05-14 DIAGNOSIS — Z82.49 FAMILY HISTORY OF CARDIOVASCULAR DISEASE: ICD-10-CM

## 2024-05-14 PROCEDURE — 99204 OFFICE O/P NEW MOD 45 MIN: CPT | Performed by: INTERNAL MEDICINE

## 2024-05-14 NOTE — PROGRESS NOTES
Cardiology Consultation     Jj Meier  [id number]  1995  [facility]  [address]  [facility] [address]    1. Family history of cardiovascular disease  Ambulatory Referral to Cardiology    CT coronary calcium score        Chief Complaint   Patient presents with    New Patient Visit     Ref by Dr. Hall - cardiac family Hx & establish care      HPI: Patient is here for cardiac evaluation due to strong family history of heart disease.  Her father had multiple strokes and heart attacks starting in 30's.  Her daughter also has left pulmonary artery stenosis.  Patient feels well, without complaints.  No reported chest pain, shortness of breath, palpitations, lightheadedness, syncope, LE edema, orthopnea, PND, or significant weight changes.  Patient remains active without any increased fatigue out of the ordinary.      Patient Active Problem List   Diagnosis    Seasonal allergies    Focal nodular hyperplasia of liver    SALLIE (generalized anxiety disorder)    Family history of thromboembolic disease    Family history of cardiovascular disease    Supervision of normal first pregnancy, antepartum     (spontaneous vaginal delivery)     Past Medical History:   Diagnosis Date    Anemia     iron deficiency    Anxiety     grief reaction    Asthma     exercise induced, inhalers not used, has effectively resolved with more exercise    GERD (gastroesophageal reflux disease)     Liver mass     focal nodular hyperplasia; 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     Social History     Socioeconomic History    Marital status: /Civil Union     Spouse name: Not on file    Number of children: Not on file    Years of education: Not on file    Highest education level: Not on file   Occupational History    Occupation: EMPLOYED    Tobacco Use    Smoking status: Never    Smokeless tobacco: Never   Vaping Use    Vaping status: Never Used   Substance and Sexual Activity    Alcohol use: Not Currently     Comment: Social    Drug use: No    Sexual activity: Yes     Partners: Male     Birth control/protection: None     Comment: TTC   Other Topics Concern    Not on file   Social History Narrative    EMPLOYED     Social Determinants of Health     Financial Resource Strain: Not on file   Food Insecurity: Not on file   Transportation Needs: Not on file   Physical Activity: Not on file   Stress: Not on file   Social Connections: Not on file   Intimate Partner Violence: Not on file   Housing Stability: Not on file      Family History   Problem Relation Age of Onset    Coronary artery disease Father     Testicular cancer Father     Heart attack Father         Multiple    Bally's disease Father     Leukemia Father     Cancer Father         Testicular, chest mass, leukemia    Deep vein thrombosis Father         One occurrence approximately 1990?    Heart disease Father     Prostate cancer Father     No Known Problems Sister     No Known Problems Maternal Grandmother     Lupus Paternal Grandfather     Ovarian cancer Other      Past Surgical History:   Procedure Laterality Date    KNEE SURGERY      LASIK      TONSILLECTOMY  age 17    WISDOM TOOTH EXTRACTION         Current Outpatient Medications:     cholecalciferol (VITAMIN D3) 25 mcg (1,000 units) tablet, , Disp: , Rfl:     EPINEPHrine (EPIPEN) 0.3 mg/0.3 mL SOAJ, Inject 0.3 mL (0.3 mg total) into a muscle once for 1 dose, Disp: 0.6 mL, Rfl: 2    Prenatal Vit-Fe Fumarate-FA (PRENATAL PO), Take by mouth, Disp: , Rfl:     Probiotic Product (PROBIOTIC DAILY PO), Take by mouth Gummy probiotic, Disp: , Rfl:     acetaminophen (TYLENOL) 325 mg tablet, Take 2 tablets (650 mg total) by mouth every 6 (six) hours as needed for mild pain or moderate pain (Patient not taking: Reported on 12/8/2023), Disp: , Rfl: 0     "ibuprofen (MOTRIN) 600 mg tablet, Take 1 tablet (600 mg total) by mouth every 6 (six) hours as needed for mild pain or moderate pain (Patient not taking: Reported on 12/8/2023), Disp: 30 tablet, Rfl: 0  Allergies   Allergen Reactions    Levofloxacin Hives     Vitals:    05/14/24 0809   BP: 110/70   BP Location: Left arm   Patient Position: Sitting   Cuff Size: Standard   Pulse: 80   SpO2: 99%   Weight: 71.7 kg (158 lb)   Height: 5' 7\" (1.702 m)       Labs:  Hospital Outpatient Visit on 04/04/2024   Component Date Value    BSA 04/04/2024 1.85     LV EF 04/04/2024 58    Appointment on 04/04/2024   Component Date Value    WBC 04/04/2024 4.92     RBC 04/04/2024 4.59     Hemoglobin 04/04/2024 14.0     Hematocrit 04/04/2024 42.6     MCV 04/04/2024 93     MCH 04/04/2024 30.5     MCHC 04/04/2024 32.9     RDW 04/04/2024 12.3     Platelets 04/04/2024 246     MPV 04/04/2024 10.3     Sodium 04/04/2024 141     Potassium 04/04/2024 4.1     Chloride 04/04/2024 103     CO2 04/04/2024 30     ANION GAP 04/04/2024 8     BUN 04/04/2024 12     Creatinine 04/04/2024 0.83     Glucose, Fasting 04/04/2024 68     Calcium 04/04/2024 9.3     AST 04/04/2024 17     ALT 04/04/2024 17     Alkaline Phosphatase 04/04/2024 67     Total Protein 04/04/2024 7.3     Albumin 04/04/2024 4.4     Total Bilirubin 04/04/2024 0.66     eGFR 04/04/2024 96     Cholesterol 04/04/2024 187     Triglycerides 04/04/2024 49     HDL, Direct 04/04/2024 71     LDL Calculated 04/04/2024 106 (H)     Non-HDL-Chol (CHOL-HDL) 04/04/2024 116     Vit D, 25-Hydroxy 04/04/2024 59.5    Admission on 11/14/2023, Discharged on 11/16/2023   Component Date Value    ABO Grouping 11/14/2023 A     Rh Factor 11/14/2023 Positive     Antibody Screen 11/14/2023 Negative     Specimen Expiration Date 11/14/2023 20231117     WBC 11/14/2023 9.71     RBC 11/14/2023 3.71 (L)     Hemoglobin 11/14/2023 11.9     Hematocrit 11/14/2023 34.7 (L)     MCV 11/14/2023 94     MCH 11/14/2023 32.1     MCHC " 11/14/2023 34.3     RDW 11/14/2023 12.7     Platelets 11/14/2023 196     MPV 11/14/2023 10.9     Extra Tube 11/14/2023 Yes     Syphilis Total Antibody 11/14/2023 Non-reactive     pH, Cord Art 11/15/2023 7.274     pCO2, Cord Art 11/15/2023 55.1     pO2, Cord Art 11/15/2023 19.8     HCO3, Cord Art 11/15/2023 25.0     Base Exc, Cord Art 11/15/2023 -2.9 (L)     O2 Content, Cord Art 11/15/2023 9.3     O2 Hgb, Arterial Cord 11/15/2023 39.3     pH, Cord Mike 11/15/2023 7.441     pCO2, Cord Mike 11/15/2023 34.3     pO2, Cord Mike 11/15/2023 35.7     HCO3, Cord Mike 11/15/2023 22.8     Base Exc, Cord Mike 11/15/2023 -0.5 (L)     O2 Cont, Cord Mike 11/15/2023 20.0     O2 HGB,VENOUS CORD 11/15/2023 80.7     PLACENTA IN STORAGE 11/15/2023 Placenta Discarded      Lab Results   Component Value Date    TRIG 49 04/04/2024    TRIG 64 12/15/2018    HDL 71 04/04/2024    HDL 64 12/15/2018     Imaging: No results found.    Review of Systems:  Review of Systems   Constitutional:  Negative for activity change, appetite change, chills, diaphoresis, fatigue and unexpected weight change.   HENT:  Negative for hearing loss, nosebleeds and sore throat.    Eyes:  Negative for photophobia and visual disturbance.   Respiratory:  Negative for cough, chest tightness, shortness of breath and wheezing.    Cardiovascular:  Negative for chest pain, palpitations and leg swelling.   Gastrointestinal:  Negative for abdominal pain, diarrhea, nausea and vomiting.   Endocrine: Negative for polyuria.   Genitourinary:  Negative for dysuria, frequency and hematuria.   Musculoskeletal:  Negative for arthralgias, back pain, gait problem and neck pain.   Skin:  Negative for pallor and rash.   Neurological:  Negative for dizziness, syncope and headaches.   Hematological:  Does not bruise/bleed easily.   Psychiatric/Behavioral:  Negative for behavioral problems and confusion.        Physical Exam:  Physical Exam  Vitals reviewed.   Constitutional:       Appearance: Normal  "appearance. She is well-developed. She is not ill-appearing or diaphoretic.   HENT:      Head: Normocephalic and atraumatic.      Nose: Nose normal.   Eyes:      General: No scleral icterus.     Extraocular Movements: Extraocular movements intact.      Pupils: Pupils are equal, round, and reactive to light.   Neck:      Vascular: No JVD.   Cardiovascular:      Rate and Rhythm: Normal rate and regular rhythm.      Heart sounds: Normal heart sounds. No murmur heard.     No friction rub. No gallop.   Pulmonary:      Effort: Pulmonary effort is normal. No respiratory distress.      Breath sounds: Normal breath sounds. No wheezing or rales.   Abdominal:      General: Bowel sounds are normal. There is no distension.      Palpations: Abdomen is soft.      Tenderness: There is no abdominal tenderness.   Musculoskeletal:         General: No deformity. Normal range of motion.      Cervical back: Normal range of motion and neck supple.      Right lower leg: No edema.      Left lower leg: No edema.   Skin:     General: Skin is warm and dry.      Findings: No rash.   Neurological:      Mental Status: She is alert and oriented to person, place, and time.      Cranial Nerves: No cranial nerve deficit.   Psychiatric:         Mood and Affect: Mood normal.         Behavior: Behavior normal.       Blood pressure 110/70, pulse 80, height 5' 7\" (1.702 m), weight 71.7 kg (158 lb), SpO2 99%, currently breastfeeding.    Discussion/Summary:  Strong family history of heart disease: Patient had an echocardiogram that was normal in April 2024.  Recent lipid panel in April 2024 revealed an LDL of 106.  Will also order a coronary calcium score to assess overall cardiac risk.  "

## 2024-05-14 NOTE — LETTER
May 14, 2024     Amie Iqbal PA-C  [address] [address]    Patient: Jj Meier   YOB: 1995   Date of Visit: 2024       Dear Dr. Iqbal:    Thank you for referring Jj Meier to me for evaluation. Below are my notes for this consultation.    If you have questions, please do not hesitate to call me. I look forward to following your patient along with you.         Sincerely,        Leo Howe MD        CC: MD Leo Ivan MD  2024  8:26 AM  Incomplete                                             Cardiology Consultation     Jj Meier  [id number]  1995  [facility] [address] [address]    1. Family history of cardiovascular disease  Ambulatory Referral to Cardiology        Chief Complaint   Patient presents with   • New Patient Visit     Ref by Dr. Hall - cardiac family Hx & establish care      HPI: Patient is here for cardiac evaluation due to strong family history of heart disease.  Patient feels well, without complaints.  No reported chest pain, shortness of breath, palpitations, lightheadedness, syncope, LE edema, orthopnea, PND, or significant weight changes.  Patient remains active without any increased fatigue out of the ordinary.      Patient Active Problem List   Diagnosis   • Seasonal allergies   • Focal nodular hyperplasia of liver   • SALLIE (generalized anxiety disorder)   • Family history of thromboembolic disease   • Family history of cardiovascular disease   • Supervision of normal first pregnancy, antepartum   •  (spontaneous vaginal delivery)     Past Medical History:   Diagnosis Date   • Anemia     iron deficiency   • Anxiety     grief reaction   • Asthma     exercise induced, inhalers not used, has effectively resolved with more exercise   • GERD (gastroesophageal reflux disease)    • Liver mass      focal nodular hyperplasia; 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     Social History     Socioeconomic History   • Marital status: /Civil Union     Spouse name: Not on file   • Number of children: Not on file   • Years of education: Not on file   • Highest education level: Not on file   Occupational History   • Occupation: EMPLOYED   Tobacco Use   • Smoking status: Never   • Smokeless tobacco: Never   Vaping Use   • Vaping status: Never Used   Substance and Sexual Activity   • Alcohol use: Not Currently     Comment: Social   • Drug use: No   • Sexual activity: Yes     Partners: Male     Birth control/protection: None     Comment: TTC   Other Topics Concern   • Not on file   Social History Narrative    EMPLOYED     Social Determinants of Health     Financial Resource Strain: Not on file   Food Insecurity: Not on file   Transportation Needs: Not on file   Physical Activity: Not on file   Stress: Not on file   Social Connections: Not on file   Intimate Partner Violence: Not on file   Housing Stability: Not on file      Family History   Problem Relation Age of Onset   • Coronary artery disease Father    • Testicular cancer Father    • Heart attack Father         Multiple   • Royce's disease Father    • Leukemia Father    • Cancer Father         Testicular, chest mass, leukemia   • Deep vein thrombosis Father         One occurrence approximately 1990?   • Heart disease Father    • Prostate cancer Father    • No Known Problems Sister    • No Known Problems Maternal Grandmother    • Lupus Paternal Grandfather    • Ovarian cancer Other      Past Surgical History:   Procedure Laterality Date   • KNEE SURGERY     • LASIK     • TONSILLECTOMY  age 17   • WISDOM TOOTH EXTRACTION         Current Outpatient Medications:   •  cholecalciferol (VITAMIN D3) 25 mcg (1,000 units) tablet, , Disp: , Rfl:   •  EPINEPHrine (EPIPEN) 0.3 mg/0.3 mL SOAJ, Inject  "0.3 mL (0.3 mg total) into a muscle once for 1 dose, Disp: 0.6 mL, Rfl: 2  •  Prenatal Vit-Fe Fumarate-FA (PRENATAL PO), Take by mouth, Disp: , Rfl:   •  Probiotic Product (PROBIOTIC DAILY PO), Take by mouth Gummy probiotic, Disp: , Rfl:   •  acetaminophen (TYLENOL) 325 mg tablet, Take 2 tablets (650 mg total) by mouth every 6 (six) hours as needed for mild pain or moderate pain (Patient not taking: Reported on 12/8/2023), Disp: , Rfl: 0  •  ibuprofen (MOTRIN) 600 mg tablet, Take 1 tablet (600 mg total) by mouth every 6 (six) hours as needed for mild pain or moderate pain (Patient not taking: Reported on 12/8/2023), Disp: 30 tablet, Rfl: 0  Allergies   Allergen Reactions   • Levofloxacin Hives     Vitals:    05/14/24 0809   BP: 110/70   BP Location: Left arm   Patient Position: Sitting   Cuff Size: Standard   Pulse: 80   SpO2: 99%   Weight: 71.7 kg (158 lb)   Height: 5' 7\" (1.702 m)       Labs:  Hospital Outpatient Visit on 04/04/2024   Component Date Value   • BSA 04/04/2024 1.85    • LV EF 04/04/2024 58    Appointment on 04/04/2024   Component Date Value   • WBC 04/04/2024 4.92    • RBC 04/04/2024 4.59    • Hemoglobin 04/04/2024 14.0    • Hematocrit 04/04/2024 42.6    • MCV 04/04/2024 93    • MCH 04/04/2024 30.5    • MCHC 04/04/2024 32.9    • RDW 04/04/2024 12.3    • Platelets 04/04/2024 246    • MPV 04/04/2024 10.3    • Sodium 04/04/2024 141    • Potassium 04/04/2024 4.1    • Chloride 04/04/2024 103    • CO2 04/04/2024 30    • ANION GAP 04/04/2024 8    • BUN 04/04/2024 12    • Creatinine 04/04/2024 0.83    • Glucose, Fasting 04/04/2024 68    • Calcium 04/04/2024 9.3    • AST 04/04/2024 17    • ALT 04/04/2024 17    • Alkaline Phosphatase 04/04/2024 67    • Total Protein 04/04/2024 7.3    • Albumin 04/04/2024 4.4    • Total Bilirubin 04/04/2024 0.66    • eGFR 04/04/2024 96    • Cholesterol 04/04/2024 187    • Triglycerides 04/04/2024 49    • HDL, Direct 04/04/2024 71    • LDL Calculated 04/04/2024 106 (H)    • " "Non-HDL-Chol (CHOL-HDL) 04/04/2024 116    • Vit D, 25-Hydroxy 04/04/2024 59.5    Admission on 11/14/2023, Discharged on 11/16/2023   Component Date Value   • ABO Grouping 11/14/2023 A    • Rh Factor 11/14/2023 Positive    • Antibody Screen 11/14/2023 Negative    • Specimen Expiration Date 11/14/2023 20231117    • WBC 11/14/2023 9.71    • RBC 11/14/2023 3.71 (L)    • Hemoglobin 11/14/2023 11.9    • Hematocrit 11/14/2023 34.7 (L)    • MCV 11/14/2023 94    • MCH 11/14/2023 32.1    • MCHC 11/14/2023 34.3    • RDW 11/14/2023 12.7    • Platelets 11/14/2023 196    • MPV 11/14/2023 10.9    • Extra Tube 11/14/2023 Yes    • Syphilis Total Antibody 11/14/2023 Non-reactive    • pH, Cord Art 11/15/2023 7.274    • pCO2, Cord Art 11/15/2023 55.1    • pO2, Cord Art 11/15/2023 19.8    • HCO3, Cord Art 11/15/2023 25.0    • Base Exc, Cord Art 11/15/2023 -2.9 (L)    • O2 Content, Cord Art 11/15/2023 9.3    • O2 Hgb, Arterial Cord 11/15/2023 39.3    • pH, Cord Mike 11/15/2023 7.441    • pCO2, Cord Mike 11/15/2023 34.3    • pO2, Cord Mike 11/15/2023 35.7    • HCO3, Cord Mike 11/15/2023 22.8    • Base Exc, Cord Mike 11/15/2023 -0.5 (L)    • O2 Cont, Cord Mike 11/15/2023 20.0    • O2 HGB,VENOUS CORD 11/15/2023 80.7    • PLACENTA IN STORAGE 11/15/2023 Placenta Discarded      Lab Results   Component Value Date    TRIG 49 04/04/2024    TRIG 64 12/15/2018    HDL 71 04/04/2024    HDL 64 12/15/2018     Imaging: No results found.    Review of Systems:  Review of Systems    Physical Exam:  Physical Exam  Blood pressure 110/70, pulse 80, height 5' 7\" (1.702 m), weight 71.7 kg (158 lb), SpO2 99%, currently breastfeeding.    Discussion/Summary:  Strong family history of heart disease: Patient had an echocardiogram that was normal in April 2024.  No recent lipid evaluation, will order today.  Will also order a coronary calcium score to assess overall cardiac risk.  "

## 2024-06-21 ENCOUNTER — TELEPHONE (OUTPATIENT)
Age: 29
End: 2024-06-21

## 2024-06-24 ENCOUNTER — HOSPITAL ENCOUNTER (OUTPATIENT)
Dept: CT IMAGING | Facility: HOSPITAL | Age: 29
Discharge: HOME | End: 2024-06-24
Attending: INTERNAL MEDICINE
Payer: COMMERCIAL

## 2024-06-24 DIAGNOSIS — Z82.49 FAMILY HISTORY OF CARDIOVASCULAR DISEASE: ICD-10-CM

## 2024-06-24 PROCEDURE — 75571 CT HRT W/O DYE W/CA TEST: CPT

## 2024-09-06 ENCOUNTER — TELEPHONE (OUTPATIENT)
Dept: GENETICS | Facility: CLINIC | Age: 29
End: 2024-09-06

## 2024-09-06 ENCOUNTER — TELEPHONE (OUTPATIENT)
Dept: OBGYN CLINIC | Facility: CLINIC | Age: 29
End: 2024-09-06

## 2024-09-06 DIAGNOSIS — Z80.9 FAMILY HISTORY OF CANCER: Primary | ICD-10-CM

## 2024-09-06 NOTE — TELEPHONE ENCOUNTER
Pt requesting to renew genetic referral, prior order from [facility] due to strong family cancer hisotry and family with genetic fancoli anmeia. Patient was not sure if this something her ob could do but understands if it can't.

## 2024-09-06 NOTE — TELEPHONE ENCOUNTER
Patient called to schedule consult with [facility]. Per patient she's been trying to schedule an appointment for about 2 weeks now but the person she spoke to stated referral was sent incorrectly. Referral is now placed by PCP, advised patient it needs to be reviewed prior scheduling and she will receive a call within 24-48 hours.. Per patient the last person she spoke to stated once referral was placed to call the office so referral can be expedited. Advised patient I will send message over to Genetics to follow up.       Thank you!

## 2024-09-09 ENCOUNTER — OFFICE VISIT (OUTPATIENT)
Dept: CARDIOLOGY CLINIC | Facility: CLINIC | Age: 29
End: 2024-09-09
Payer: COMMERCIAL

## 2024-09-09 VITALS
WEIGHT: 154.6 LBS | HEIGHT: 67 IN | HEART RATE: 75 BPM | SYSTOLIC BLOOD PRESSURE: 108 MMHG | DIASTOLIC BLOOD PRESSURE: 62 MMHG | BODY MASS INDEX: 24.27 KG/M2

## 2024-09-09 DIAGNOSIS — Z82.49 FAMILY HISTORY OF CARDIOVASCULAR DISEASE: Primary | ICD-10-CM

## 2024-09-09 PROCEDURE — 99214 OFFICE O/P EST MOD 30 MIN: CPT | Performed by: INTERNAL MEDICINE

## 2024-09-09 NOTE — ASSESSMENT & PLAN NOTE
Strong family history of heart disease: Patient had an echocardiogram that was normal in April 2024.  Recent lipid panel in April 2024 revealed an LDL of 106.  We checked a coronary calcium score, which revealed a score of 0.  At the current time, her overall cardiac risk is relatively low.

## 2024-09-09 NOTE — PROGRESS NOTES
"Cardiology Follow Up    Jj Meier  1995  [id number]  [facility] [address] [id number] [phone]      1. Family history of cardiovascular disease  Assessment & Plan:  Strong family history of heart disease: Patient had an echocardiogram that was normal in April 2024.  Recent lipid panel in April 2024 revealed an LDL of 106.  We checked a coronary calcium score, which revealed a score of 0.  At the current time, her overall cardiac risk is relatively low.       Chief Complaint   Patient presents with    Follow-up     3 months, no complaints       Interval History: Patient is here for cardiac evaluation due to strong family history of heart disease.  Her father had multiple strokes and heart attacks starting in 30's.  Her daughter also has left pulmonary artery stenosis.  Patient feels well, without complaints.  No reported chest pain, shortness of breath, palpitations, lightheadedness, syncope, LE edema, orthopnea, PND, or significant weight changes.  Patient remains active without any increased fatigue out of the ordinary.      Blood pressure 108/62, pulse 75, height 5' 7\" (1.702 m), weight 70.1 kg (154 lb 9.6 oz), currently breastfeeding.    Review of Systems:  Review of Systems   Constitutional:  Negative for activity change, appetite change, chills, diaphoresis, fatigue and unexpected weight change.   HENT:  Negative for hearing loss, nosebleeds and sore throat.    Eyes:  Negative for photophobia and visual disturbance.   Respiratory:  Negative for cough, chest tightness, shortness of breath and wheezing.    Cardiovascular:  Negative for chest pain, palpitations and leg swelling.   Gastrointestinal:  Negative for abdominal pain, diarrhea, nausea and vomiting.   Endocrine: Negative for polyuria.   Genitourinary:  Negative for dysuria, frequency and hematuria.   Musculoskeletal:  Negative for arthralgias, back pain, gait " problem and neck pain.   Skin:  Negative for pallor and rash.   Neurological:  Negative for dizziness, syncope and headaches.   Hematological:  Does not bruise/bleed easily.   Psychiatric/Behavioral:  Negative for behavioral problems and confusion.        Physical Exam:  Physical Exam  Vitals reviewed.   Constitutional:       Appearance: Normal appearance. She is well-developed. She is not ill-appearing or diaphoretic.   HENT:      Head: Normocephalic and atraumatic.      Nose: Nose normal.   Eyes:      General: No scleral icterus.     Extraocular Movements: Extraocular movements intact.      Pupils: Pupils are equal, round, and reactive to light.   Neck:      Vascular: No JVD.   Cardiovascular:      Rate and Rhythm: Normal rate and regular rhythm.      Heart sounds: Normal heart sounds. No murmur heard.     No friction rub. No gallop.   Pulmonary:      Effort: Pulmonary effort is normal. No respiratory distress.      Breath sounds: Normal breath sounds. No wheezing or rales.   Abdominal:      General: Bowel sounds are normal. There is no distension.      Palpations: Abdomen is soft.      Tenderness: There is no abdominal tenderness.   Musculoskeletal:         General: No deformity. Normal range of motion.      Cervical back: Normal range of motion and neck supple.      Right lower leg: No edema.      Left lower leg: No edema.   Skin:     General: Skin is warm and dry.      Findings: No rash.   Neurological:      Mental Status: She is alert and oriented to person, place, and time.      Cranial Nerves: No cranial nerve deficit.   Psychiatric:         Mood and Affect: Mood normal.         Behavior: Behavior normal.         Patient Active Problem List   Diagnosis    Seasonal allergies    Focal nodular hyperplasia of liver    SALLIE (generalized anxiety disorder)    Family history of thromboembolic disease    Family history of cardiovascular disease    Supervision of normal first pregnancy, antepartum     (spontaneous  vaginal delivery)     Past Medical History:   Diagnosis Date    Anemia     iron deficiency    Anxiety     grief reaction    Asthma     exercise induced, inhalers not used, has effectively resolved with more exercise    GERD (gastroesophageal reflux disease)     Liver mass     focal nodular hyperplasia; 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     Social History     Socioeconomic History    Marital status: /Civil Union     Spouse name: Not on file    Number of children: Not on file    Years of education: Not on file    Highest education level: Not on file   Occupational History    Occupation: EMPLOYED   Tobacco Use    Smoking status: Never    Smokeless tobacco: Never   Vaping Use    Vaping status: Never Used   Substance and Sexual Activity    Alcohol use: Not Currently     Comment: Social    Drug use: No    Sexual activity: Yes     Partners: Male     Birth control/protection: None     Comment: TTC   Other Topics Concern    Not on file   Social History Narrative    EMPLOYED     Social Determinants of Health     Financial Resource Strain: Not on file   Food Insecurity: Not on file   Transportation Needs: Not on file   Physical Activity: Not on file   Stress: Not on file   Social Connections: Unknown (6/18/2024)    Received from [facility]    Social Connections     How often do you feel lonely or isolated from those around you? (Adult - for ages 18 years and over): Not on file   Intimate Partner Violence: Not on file   Housing Stability: Not on file      Family History   Problem Relation Age of Onset    Coronary artery disease Father     Testicular cancer Father     Heart attack Father         Multiple    Royce's disease Father     Leukemia Father     Cancer Father         Testicular, chest mass, leukemia    Deep vein thrombosis Father         One occurrence approximately 1990?    Heart disease Father     Prostate cancer Father     No Known  Problems Sister     No Known Problems Maternal Grandmother     Lupus Paternal Grandfather     Ovarian cancer Other      Past Surgical History:   Procedure Laterality Date    KNEE SURGERY      LASIK      TONSILLECTOMY  age 17    WISDOM TOOTH EXTRACTION         Current Outpatient Medications:     cholecalciferol (VITAMIN D3) 25 mcg (1,000 units) tablet, , Disp: , Rfl:     EPINEPHrine (EPIPEN) 0.3 mg/0.3 mL SOAJ, Inject 0.3 mL (0.3 mg total) into a muscle once for 1 dose, Disp: 0.6 mL, Rfl: 2    Prenatal Vit-Fe Fumarate-FA (PRENATAL PO), Take by mouth, Disp: , Rfl:     Probiotic Product (PROBIOTIC DAILY PO), Take by mouth Gummy probiotic, Disp: , Rfl:   Allergies   Allergen Reactions    Levofloxacin Hives       Labs:  Hospital Outpatient Visit on 04/04/2024   Component Date Value    BSA 04/04/2024 1.85     LV EF 04/04/2024 58    Appointment on 04/04/2024   Component Date Value    WBC 04/04/2024 4.92     RBC 04/04/2024 4.59     Hemoglobin 04/04/2024 14.0     Hematocrit 04/04/2024 42.6     MCV 04/04/2024 93     MCH 04/04/2024 30.5     MCHC 04/04/2024 32.9     RDW 04/04/2024 12.3     Platelets 04/04/2024 246     MPV 04/04/2024 10.3     Sodium 04/04/2024 141     Potassium 04/04/2024 4.1     Chloride 04/04/2024 103     CO2 04/04/2024 30     ANION GAP 04/04/2024 8     BUN 04/04/2024 12     Creatinine 04/04/2024 0.83     Glucose, Fasting 04/04/2024 68     Calcium 04/04/2024 9.3     AST 04/04/2024 17     ALT 04/04/2024 17     Alkaline Phosphatase 04/04/2024 67     Total Protein 04/04/2024 7.3     Albumin 04/04/2024 4.4     Total Bilirubin 04/04/2024 0.66     eGFR 04/04/2024 96     Cholesterol 04/04/2024 187     Triglycerides 04/04/2024 49     HDL, Direct 04/04/2024 71     LDL Calculated 04/04/2024 106 (H)     Non-HDL-Chol (CHOL-HDL) 04/04/2024 116     Vit D, 25-Hydroxy 04/04/2024 59.5      Lab Results   Component Value Date    TRIG 49 04/04/2024    TRIG 64 12/15/2018    HDL 71 04/04/2024    HDL 64 12/15/2018     Imaging: No  results found.

## 2024-09-23 ENCOUNTER — TELEMEDICINE (OUTPATIENT)
Dept: PERINATAL CARE | Facility: CLINIC | Age: 29
End: 2024-09-23

## 2024-09-23 DIAGNOSIS — Z31.430 ENCOUNTER OF FEMALE FOR TESTING FOR GENETIC DISEASE CARRIER STATUS FOR PROCREATIVE MANAGEMENT: ICD-10-CM

## 2024-09-23 DIAGNOSIS — Z31.5 ENCOUNTER FOR PROCREATIVE GENETIC COUNSELING: ICD-10-CM

## 2024-09-23 DIAGNOSIS — Z84.89 FAMILY HISTORY OF GENETIC DISEASE: Primary | ICD-10-CM

## 2024-09-23 PROCEDURE — NC001 PR NO CHARGE: Performed by: GENETIC COUNSELOR, MS

## 2024-09-25 NOTE — PROGRESS NOTES
Preconception Genetic Counseling Note    Appointment Date:  2024  Referred By: Hussain, [place]*  YOB: 1995  Partner:  Ramone Meier  Indication for Visit:  personal and/or family history of genetic disorder:  Fanconi Anemia  Pregnancy History:   Estimated Date of Delivery: NA  Estimated Gestational Age: NA    Virtual Regular Visit  [facility]    Verification of patient location:    Patient is located at Home in the following state in which I hold an active license [place]    The patient was identified by name and date of birth. Jj Meier was informed that this is a telemedicine visit and that the visit is being conducted through the Epic Embedded platform. She agrees to proceed..  My office door was closed. No one else was in the room.  She acknowledged consent and understanding of privacy and security of the video platform. The patient has agreed to participate and understands they can discontinue the visit at any time.    Subjective:  Jj is a 29 y.o. female who presented for preconception genetic counseling to discuss a family history of Fanconi Anemia.    The patient reports two female cousins (from a paternal aunt) who were diagnosed with Fanconi Anemia (FA), after the eldest daughter had abnormal bloodwork findings (initially done for heavy menstrual cycles).  They do not exhibit any physical features of FA and are otherwise healthy.  Jj's paternal aunt also has no medical concerns or features of FA.  Of note, Jj's father had a significant cardiac and cancer history including acute myeloid leukemia in his 50s.    Fanconi anemia is a DNA repair deficiency syndrome characterized by bone marrow failure, increased cancer risk, and physical anomalies (occurs in 75% of cases) such as short stature, microcephaly, and congenital anomalies.  Individuals with FA have a higher risk of developing acute myeloid leukemia  "by age 50.  FA is caused by biallelic pathogenic variants in 21 known genes (associated with autosomal recessive form), but can also have a rare X-linked and autosomal dominant inheritance.  Biallelic mutations of FANCA, FANCC, and FANCG account for over 80% of cases.  We discussed that somes cases are due to variants in the BRCA1 and BRCA2 genes which can also cause autosomal dominant cancer predisposition syndromes in heterozygous \"carriers\".  We reviewed that Jj's father's cancer diagnoses may or may not be related to the family history of FA.  Jj has an appointment with Hereditary Oncology on 10/28 to more thoroughly discuss her family's cancer history and to have appropriate genetic testing ordered if elected.  We discussed that knowing what Fanconi anemia gene is involved with her cousins diagnosis would be helpful in determining potential etiology for her father's cancer.  Jj stated she will reach out to her aunt to try and get that information and/or genetic testing results for her cousin.    We reviewed that if BRCA1 or BRCA2 genes are the cause for FA in the family, testing for Jj can be performed with the understanding that if she is found to have a pathogenic variant her personal risk for cancer increases.  This testing would be arranged by Hereditary Oncology as they can help with medical management if she receives a positive result.  Carrier screening for other autosomal recessive forms of FA can be arranged by our office.  This can be done by individual gene sequencing (if just the gene is known), familial variant testing (if exact pathogenic variant can be obtained), or a larger expanded carrier screening panel.  We discussed that these panels can test for carrier status for over 500 autosomal recessive and X-linked diseases. All of the diseases included on the panel are life threatening, life limiting, or have treatments available. After reviewing the benefits and limitations of expanded " carrier screening Jj is considering the screening.  She would like to obtain more information about the FA diagnosis in the family prior to having a panel run to ensure any suspect genes are included on that panel.  If Jj is found to be a carrier, screening for her  can be arranged.  If they are both positive there is a 25% chance their daughter could be affected as well as any future pregnancy.    Histories for the patient and her partner's family were taken during our session.  The couple's daughter was diagnosed with left pulmonary artery stenosis at a few months of life but has spontaneously resolved.  She is currently doing well at almost 11 months of age.  We discussed that this history could increase the risk for future pregnancies for a similar finding.  Jj also reports a paternal aunt with cerebellar degeneration diagnosed in her 20s, who is now wheelchair dependant in her early 50s.  Etiology is unknown thus specified risk to other family members cannot be determined.  Further review of family history for the patient and her partner was noncontributory.  The family history was not significant for other genetic diseases or disorders, intellectual disability, birth defects, fetal loss, or consanguinity.  Patient reports being of /[place] descent and that her  is of  descent.  She denies either of them having known Ashkenazi Voodoo ancestry.  The patient previously had Cystic fibrosis (CF) and Spinal muscular atrophy (SMA) carrier screening which was negative.    Lastly, we discussed the fact that everyone in the general population regardless of age, family history, or medical background has approximately a 3-5% risk of having a child with some type of congenital anomaly, genetic disease or intellectual disability. Currently there are no tests available to rule out all birth defects or health problems.    Jj was provided with our contact information.  I encouraged her  to call with any questions or concerns.    Plan/Tests Ordered:  1) Patient to obtain more information on which gene is involved, or obtain genetic testing records.  2) Patient considering expanded carrier screening for her and her  - will finalize screening option once more information on familial gene variant is obtained.  3) Consultation with Hereditary Oncology scheduled for 10/28/24.      Visit Time  Total Visit Duration: 30 minutes    **POST VISIT: After the visit, the patient informed me that she confirmed with her aunt that the FANCC gene is causative for the FA diagnosis in her daughter.  The aunt herself is undergoing genetic testing thus results will not be available for a few weeks.  We made a plan to follow up once the aunt's testing is completed.

## 2024-10-08 NOTE — PROGRESS NOTES
I have reviewed the notes, assessments, and plan by Genetic Counselor, I concur with her documentation of Jj Meier.

## 2024-10-23 ENCOUNTER — DOCUMENTATION (OUTPATIENT)
Dept: GENETICS | Facility: CLINIC | Age: 29
End: 2024-10-23

## 2024-10-28 ENCOUNTER — OFFICE VISIT (OUTPATIENT)
Dept: GENETICS | Facility: CLINIC | Age: 29
End: 2024-10-28
Payer: COMMERCIAL

## 2024-10-28 ENCOUNTER — DOCUMENTATION (OUTPATIENT)
Dept: GENETICS | Facility: CLINIC | Age: 29
End: 2024-10-28

## 2024-10-28 DIAGNOSIS — Z80.41 FAMILY HISTORY OF OVARIAN CANCER: ICD-10-CM

## 2024-10-28 DIAGNOSIS — Z80.42 FAMILY HISTORY OF PROSTATE CANCER IN FATHER: ICD-10-CM

## 2024-10-28 DIAGNOSIS — Z80.9 FAMILY HISTORY OF CANCER: Primary | ICD-10-CM

## 2024-10-28 PROCEDURE — 96040 PR MEDICAL GENETICS COUNSELING EACH 30 MINUTES: CPT

## 2024-10-28 NOTE — PROGRESS NOTES
"Pre-Test Genetic Counseling Consult Note    Patient Name: Jj Meier   /Age: 1995/ y.o.  Referring Provider:  Stephanie Hall MD    Date of Service: 10/28/2024  Genetic Counselor: Estela Cortes MS, [facility] [place]  Interpretation Services: None  Location: Telephone consult   Length of Visit: 60 Minutes    Jj was referred to the [facility] Cancer Risk and Genetic Assessment Program due to her family history of cancer . she presents today to discuss the possibility of a hereditary cancer syndrome, options for genetic testing, and implications for her and her family.    Cancer History and Treatment:   Personal History:   Oncology History    No history exists.     Screening Hx:   Breast:  Breast Imaging: None    Colon:  Colonoscopy: None    Gynecologic:  Ovaries and Uterus intact     Skin:  No current screening    Other screening: None    Reproductive History  Age at menarche: 12  Age at first live birth:  28  Menopause: Premenopausal  Hormone replacement: None    Medical and Surgical History  Pertinent surgical history:   Past Surgical History:   Procedure Laterality Date    KNEE SURGERY      LASIK      TONSILLECTOMY  age 17    WISDOM TOOTH EXTRACTION        Pertinent medical history:  Past Medical History:   Diagnosis Date    Anemia     iron deficiency    Anxiety     grief reaction    Asthma     exercise induced, inhalers not used, has effectively resolved with more exercise    GERD (gastroesophageal reflux disease)     Liver mass     focal nodular hyperplasia; 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         Other History:  Height:   Ht Readings from Last 1 Encounters:   24 5' 7\" (1.702 m)     Weight:   Wt Readings from Last 1 Encounters:   24 70.1 kg (154 lb 9.6 oz)     Relevant Family History   Patient reports no Ashkenazi Sabianism ancestry. In addition, patient reports that three of her paternal first-cousins were " diagnosed with Fanconi Anemia due to mutations in the FANCC gene with at least one of those known mutations being c.67del (no genetic test report was available for review).         Please refer to the scanned pedigree in the Media Tab for a complete family history     *All history is reported as provided by the patient; records are not available for review, except where indicated.     Assessment:  We discussed sporadic, familial and hereditary cancer. We reviewed that only 5-10% of cancers are considered hereditary. Cancers such as lung cancer and testicular cancer rarely have a hereditary etiology, and we cannot test for a genetic predisposition for these cancers at this time. We also discussed the many factors that influence our risk for cancer such as age, environmental exposures, lifestyle choices and family history.     We reviewed the indications suggestive of a hereditary predisposition to cancer.     We discussed that Fanconi Anemia is an autosomal recessive condition caused by biallelic pathogenic variants in the FANCC gene characterized by bone marrow failure and increased risks for cancers such as Acute myelogenous leukemia (AML) and squamous cell carcinomas of the head, neck, and esophagus. Based on Fanconi Anemia due to mutations in the FANCC gene being diagnosed in Jj's three paternal first-cousins, there is a 25% chance that Jj is a carrier of a FANCC mutation. Of note, there is currently insufficient evidence of an association with increased cancer risks for FANCC carriers over the general population (PMID: 54060076).    Of note, while testing an affected family member is most informative, it is also appropriate to test unaffected family members who meet testing criteria (NCCN Guidelines for Genetic/Familial High-Risk Assessment: Breast, Ovarian, and Pancreatic).      Genetic testing is indicated for Jj based on the following criteria: Meets NCCN V1.2025 Testing Criteria for Prostate Cancer  Susceptibility Genes: Jj has a first-degree blood relative  (Jj's father) who had a personal history of prostate cancer with >=1 close blood relative with ovarian cancer (Jj's paternal great-grandmother).    The risks, benefits, and limitations of genetic testing were reviewed with the patient, as well as genetic discrimination laws, and possible test results (positive, negative, variants of uncertain significance) and their clinical implications. If positive for a mutation, options for managing cancer risk including increased surveillance, chemoprevention, and in some cases prophylactic surgery were discussed. Jj was informed that if a hereditary cancer syndrome was identified in her, first degree relatives (parents, siblings, and children) have a chance of also inheriting the condition. Genetic testing would allow for predictive genetic testing in other relatives, who may also be at risk depending on their degree of relation.     Billing:  Most individuals pay <$100 for hereditary cancer genetic testing. If insurance covers the cost of the testing, individuals may still pay out of pocket secondary to co-pays, co-insurance, or deductibles. If the cost of the testing exceeds $100, the lab will reach out to the patient via phone or e-mail. The patient will then have the option to proceed with the testing, cancel the testing, or elect the self-pay option of $250. Jj verbalized understanding.     Plan: Patient decided to proceed with testing and provided consent.    Summary:     Sample Collection:  A blood kit was mailed home to the patient    Genetic Testing Preformed: [employer]: Cancer® +RNAinsight® (60 genes): APC, CARLOS ALBERTO, AXIN2, BAP1, BARD1, BMPR1A, BRCA1, BRCA2, BRIP1, CDH1, CDK4, CDKN1B, CDKN2A, CHEK2, CTNNA1, DICER1, EGLN1, EPCAM, FANCC, FH, FLCN, GREM1, HOXB13, KIF1B, KIT, MAX, MEN1, MET, MITF, MLH1, MSH2, MSH3, MSH6, MUTYH, NF1, NTHL1, PALB2, PDGFRA PMS2, POLD1, POLE, POT1, PTEN, RAD51C, RAD51D,  RB1, RET, SDHA, SDHAF2, SDHB, SDHC, SDHD, SMAD4, SMARCA4, STK11, QZJS019, TP53, TSC1, TSC2, VHL      Result Call Information:  In the event that we need to reach Jj via telephone:  I confirmed the patient's mobile number on file as the best number to call with results  I confirmed with the patient that we can leave a voicemail on the provided numbers    Results take approximately 2-3 weeks to complete once test is started.    Jj will be notified via [email or web address] once results are available.      Additional recommendations for surveillance/medical management will be made pending genetic test results.

## 2024-11-06 ENCOUNTER — APPOINTMENT (OUTPATIENT)
Dept: LAB | Facility: MEDICAL CENTER | Age: 29
End: 2024-11-06
Payer: COMMERCIAL

## 2024-11-06 DIAGNOSIS — Z80.41 FAMILY HISTORY OF MALIGNANT NEOPLASM OF OVARY: ICD-10-CM

## 2024-11-06 DIAGNOSIS — Z80.42 FAMILY HISTORY OF MALIGNANT NEOPLASM OF PROSTATE: ICD-10-CM

## 2024-11-06 DIAGNOSIS — Z80.0 FAMILY HISTORY OF MALIGNANT NEOPLASM OF GASTROINTESTINAL TRACT: ICD-10-CM

## 2024-11-06 PROCEDURE — 36415 COLL VENOUS BLD VENIPUNCTURE: CPT

## 2024-11-25 ENCOUNTER — DOCUMENTATION (OUTPATIENT)
Dept: OBGYN CLINIC | Facility: CLINIC | Age: 29
End: 2024-11-25

## 2024-11-25 DIAGNOSIS — N89.8 VAGINAL IRRITATION: Primary | ICD-10-CM

## 2024-11-25 RX ORDER — FLUCONAZOLE 150 MG/1
150 TABLET ORAL
Qty: 2 TABLET | Refills: 0 | Status: SHIPPED | OUTPATIENT
Start: 2024-11-25 | End: 2024-11-29

## 2024-11-25 NOTE — PROGRESS NOTES
Pt complaining of irritation despite treatment OTC Monistat. Advised fluconazole and hydrocortisone topically.

## 2024-11-29 LAB
GENE DIS ANL INTERP-IMP: ABNORMAL
INTERPRETATION: ABNORMAL

## 2024-12-02 ENCOUNTER — DOCUMENTATION (OUTPATIENT)
Dept: PERINATAL CARE | Facility: CLINIC | Age: 29
End: 2024-12-02

## 2024-12-02 ENCOUNTER — TELEPHONE (OUTPATIENT)
Facility: HOSPITAL | Age: 29
End: 2024-12-02

## 2024-12-02 ENCOUNTER — TELEPHONE (OUTPATIENT)
Dept: GENETICS | Facility: CLINIC | Age: 29
End: 2024-12-02

## 2024-12-02 DIAGNOSIS — Z15.09 ATM GENE MUTATION POSITIVE: Primary | ICD-10-CM

## 2024-12-02 DIAGNOSIS — Z15.01 ATM GENE MUTATION POSITIVE: Primary | ICD-10-CM

## 2024-12-02 NOTE — TELEPHONE ENCOUNTER
Post-Test Genetic Counseling Consult Note  Today I spoke with Jj over the phone to review the results of her genetic test for hereditary cancer. We met previously on 10/28/24 for pre-test counseling.     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  The CARLOS ALBERTO gene is associated with autosomal dominant predisposition to breast, pancreatic, ovarian and possibly prostate cancer.  There is also preliminary evidence suggesting CARLOS ALBERTO is associated with other cancer types including stomach, bladder and colon, although available evidence is insufficient to make a determination regarding these relationships. The CARLOS ALBERTO gene is also associated with autosomal recessive ataxia-telangiectasia (A-T).      CARLOS ALBERTO-associated Cancer Risks:   Women who carry a single pathogenic CARLOS ALBERTO variant have an increased risk of breast cancer over their lifetime. The lifetime risk for breast cancer is between 21-24%. There is also evidence suggesting women have an increased risk for ovarian cancer (2-3%).     Men with a single pathogenic variant in CARLOS ALBERTO have an increased risk of prostate cancer although the exact risk is not currently known.     Both men and women have an increased risk of pancreatic cancer.  The lifetime risk for pancreatic cancer is approximately 5-10%.     There is also evidence to suggest an association between CARLOS ALBERTO and other cancers including stomach,  bladder, and colon (5-10%), although available evidence is insufficient to make a determination regarding these relationships.     Reproductive Risks:  Individuals with a single pathogenic variant in the CARLOS ALBERTO gene are also carriers of an autosomal recessive condition known as ataxia-telangiectasia (A-T).  Autosomal recessive means an individual needs 2 pathogenic variants in the CARLOS ALBERTO gene to be affected with ataxia-telangiectasia.  If both Jj and her partner carry 1 CARLOS ALBERTO pathogenic variant, there is a 25% risk of having a child with ataxia-telangiectasia.     For patients of reproductive age, there are options for prenatal diagnosis and assisted reproduction including pre-implantation genetic diagnosis. Jj and I reviewed that her partner can consider testing for the CARLOS ALBERTO gene for reproductive implications. Jj reports that carrier screening for the CARLOS ALBERTO gene for her partner has already been initiated through [facility]. If Jj's partner is also found to have an CARLOS ALBERTO pathogenic variant, genetic testing would be recommended for their daughter.     Risks and Testing for Family Members:  This test result may help clarify the risk for other family members to develop cancer. All first-degree relatives (parents, siblings and children) have up to a 50% chance of having the pathogenic variant.  Other relatives such as aunts, uncles and cousins may also be at risk.  Since it is not known with one-hundred percent certainty which side of the family this CARLOS ALBERTO pathogenic variant came from, we recommend that Jj share this test result with extended family members from both sides of her family.       We discussed that each of Jj 's children have a 50% chance to inherit this CARLOS ALBERTO pathogenic variant. However, testing is not recommended for children under the age of 18 if only one parent has an CARLOS ALBERTO pathogenic variant, as there are no childhood cancer risks known to be associated with a single pathogenic variant in this gene.    We  encouraged Jj  to discuss this information with her relatives.  If Jj's family members have any questions or are interested in testing they can reach out to the main Genetics number at [phone] for additional information.      Additional Information:  A healthy lifestyle will improve overall health and reduce risk for illness. Eating a healthy diet and exercising for 4 hours per week is recommended. Both diet and exercise have been shown to help maintain a healthy weight. Postmenopausal women who are overweight are at higher risk for breast cancer. Moderate to heavy alcohol use can increase the risk for some cancers. Smoking cigarettes can also increase risk for breast, lung, prostate, pancreatic and other cancers.       Management:  Management guidelines for individuals with a pathogenic or likely pathogenic CARLOS ALBERTO variant have been developed by the National Comprehensive Cancer Network.  Please refer to the current NCCN guidelines for the most up to date guidelines (https://www.nccn.org/guidelines/category_2).  The recommendations listed below are specific to Jj and are are based on recommendations in the the NCCN guidelines as of 12/02/24.  These recommendations are subject to change over time and the newest guidelines should be referenced for the most up to date recommendations.      Plan:  Breast Cancer  Annual mammography starting at 40 years of age  Consider annual breast MRI with and without contrast starting at age 30-35  Prophylactic risk-reducing mastectomy: evidence insufficient; manage based on family history.     Pancreatic Cancer Screening:  Per current NCCN Guidelines, all individuals with an CARLOS ALBERTO mutation can consider pancreatic cancer screening regardless of a family history of pancreatic cancer. Pancreatic cancer screening typically begins at age 50 (or 10 years younger than the earliest exocrine pancreatic cancer diagnosis in the family, whichever is earlier) and includes  contrast-enhanced MRI/MRCP (magnetic resonance cholangiopancreatography) and/or endoscopic ultrasound (EU).      Pancreatic cancer screening is available for Jj beginning at age 50 based on her CARLOS ALBERTO mutation and absence of a family history of pancreatic cancer. If Jj is interested in meeting with our high risk pancreatic cancer screening to establish care, she can call [facility] at [phone].    Referral Placed:  Ambulatory Referral to Surgical Oncology  Ambulatory Referral to Gastroenterology     Ovarian Cancer:  There is insufficient evidence for risk-reducing salpingo-oophorectomy (RRSO), or surgical removal of the ovaries preventatively. This risk should be managed based on family history.     Colon Cancer  Since the association between the CARLOS ALBERTO gene and colon cancer has not yet been completely established there are no screening recommendations for colon cancer.     Ionizing Radiation:   Individuals with a single pathogenic CARLOS ALBERTO variant do not need to avoid radiation therapy at this time.     Other Screening  There are no additional recommendations based on Jj's result. Jj should continue cancer screening and medical management as clinically indicated and as determined appropriate by her healthcare providers.    Positive Result: Jj was strongly encouraged to follow up with our office on an annual basis to review the most up to date guidelines as recommendations are subject to change over time.

## 2024-12-02 NOTE — PROGRESS NOTES
Patient contacted me regarding her abnormal testing results from Hereditary Oncology.  She expressed concern about ataxia-telangectasia in her daughter as she was personally found to have an CARLOS ALBERTO gene variant.  The autosomal recessive inheritance pattern was reviewed and we discussed the availability of carrier screening/testing for her .  This can be done as part of a larger carrier screening panel (as previously discussed) or as individual gene sequencing.  We also discussed that this may be ordered by hereditary oncology due to the increased risk for cancer in individuals who are heterozygous.  Patient elected to have me order the carrier screening/testing for her  via [employer], just for single CARLOS ALBERTO gene sequencing.  She will  a saliva collection kit at our [facility] office.  Results should take about 14-21 days to return and they will be notified when they are available.  Should Ramone's results also be positive for a pathogenic variant a referral to hereditary oncology would be recommended as well as testing for their daughter.  Patient expressed verbal understanding and had no questions.

## 2024-12-02 NOTE — TELEPHONE ENCOUNTER
Jj messaged me via Teams today to discuss the Anna Marie sample collection for her  (see [facility] documentation from today 12/2/2024). We discussed how to appropriately label and package the specimen. She had no further questions or concerns.    Yenni Zhao, CGC

## 2024-12-03 PROBLEM — Z15.01 ATM GENE MUTATION POSITIVE: Status: ACTIVE | Noted: 2024-12-03

## 2024-12-03 PROBLEM — Z15.09 ATM GENE MUTATION POSITIVE: Status: ACTIVE | Noted: 2024-12-03

## 2024-12-04 ENCOUNTER — TELEPHONE (OUTPATIENT)
Dept: HEMATOLOGY ONCOLOGY | Facility: CLINIC | Age: 29
End: 2024-12-04

## 2024-12-04 NOTE — TELEPHONE ENCOUNTER
Referral to Surgical Oncology received.  Chart reviewed by  for Surgical oncology at this time.       Diagnosis:   Z15.01, Z15.09 (ICD-10-CM) - CARLOS ALBERTO gene mutation positive     After review of chart, instructions for scheduling added to referral and sent to be scheduled as advised.

## 2024-12-13 ENCOUNTER — DOCUMENTATION (OUTPATIENT)
Dept: PERINATAL CARE | Facility: CLINIC | Age: 29
End: 2024-12-13

## 2024-12-13 NOTE — PROGRESS NOTES
Patient's 's (Ramone) CARLOS ALBERTO gene sequencing returned as negative which significantly reduces the chance for ataxia telangectasia in their daughter and future children.  Both the patient and her  are aware of these results.  Results are scanned into his chart.

## 2025-01-07 ENCOUNTER — TELEPHONE (OUTPATIENT)
Dept: OBGYN CLINIC | Facility: CLINIC | Age: 30
End: 2025-01-07

## 2025-01-07 ENCOUNTER — OFFICE VISIT (OUTPATIENT)
Dept: SURGICAL ONCOLOGY | Facility: CLINIC | Age: 30
End: 2025-01-07
Payer: COMMERCIAL

## 2025-01-07 ENCOUNTER — LAB (OUTPATIENT)
Dept: LAB | Facility: MEDICAL CENTER | Age: 30
End: 2025-01-07
Payer: COMMERCIAL

## 2025-01-07 VITALS
HEIGHT: 67 IN | BODY MASS INDEX: 24.33 KG/M2 | SYSTOLIC BLOOD PRESSURE: 127 MMHG | RESPIRATION RATE: 14 BRPM | DIASTOLIC BLOOD PRESSURE: 77 MMHG | OXYGEN SATURATION: 99 % | TEMPERATURE: 97.2 F | WEIGHT: 155 LBS | HEART RATE: 74 BPM

## 2025-01-07 DIAGNOSIS — Z32.01 POSITIVE PREGNANCY TEST: ICD-10-CM

## 2025-01-07 DIAGNOSIS — Z32.01 POSITIVE PREGNANCY TEST: Primary | ICD-10-CM

## 2025-01-07 DIAGNOSIS — Z15.01 ATM GENE MUTATION POSITIVE: ICD-10-CM

## 2025-01-07 DIAGNOSIS — Z15.09 ATM GENE MUTATION POSITIVE: ICD-10-CM

## 2025-01-07 LAB
B-HCG SERPL-ACNC: 101.2 MIU/ML (ref 0–5)
PROGEST SERPL-MCNC: 14.12 NG/ML

## 2025-01-07 PROCEDURE — 99204 OFFICE O/P NEW MOD 45 MIN: CPT

## 2025-01-07 PROCEDURE — 84702 CHORIONIC GONADOTROPIN TEST: CPT

## 2025-01-07 PROCEDURE — 84144 ASSAY OF PROGESTERONE: CPT

## 2025-01-07 PROCEDURE — 36415 COLL VENOUS BLD VENIPUNCTURE: CPT

## 2025-01-07 NOTE — ASSESSMENT & PLAN NOTE
Patient is a 29 year old female presenting for a consult for increased risk of breast cancer secondary to CARLOS ALBERTO mutation. She was referred by oncology genetics. She has no family history of breast cancer. Her father was dx with prostate cancer at 45 y.o and unfortunately passed from metastatic disease. We discussed her increased cancer risk. The lifetime risk for breast cancer is between 21-24%. The lifetime risk for pancreatic cancer is approximately 5-10%. There is also evidence to suggest an association between CARLOS ALBERTO and other cancers including stomach, bladder, and colon (5-10%), although available evidence is insufficient to make a determination regarding these relationships. Per current NCCN guidelines, we will plan to initiate annual mammography starting at 40 years of age and breast MRI with and without contrast starting at age 35, unless otherwise indicated. She was referred to gastroenterology for her pancreatic ca risk. Pancreatic cancer screening typically begins at age 50. I will plan to see her annually for cbe. I discussed modified lifestyle changes to reduce her cancer risk including maintaining a healthy weight, getting regular exercise, eating a healthy diet, and limiting alcohol use. I discussed abnormal findings on a breast exam. There were no concerns on her breast exam today. I will see the patient back in 1 year or sooner should the need arise. She was instructed to call with any questions or concerns prior to this time. All questions were answered today.    Orders:  •  Ambulatory Referral to Surgical Oncology

## 2025-01-07 NOTE — TELEPHONE ENCOUNTER
Patient had positive pregnancy test as of yesterday.  Lmp: 11/25    Patient would like hcg ordered and progesterone. Ordering blood work.

## 2025-01-07 NOTE — PROGRESS NOTES
Name: Jj Meier      : 1995      MRN: [id number]  Encounter Provider: MANUELA Kerr  Encounter Date: 2025   Encounter department: [facility] [place]  :  Assessment & Plan  CARLOS ALBERTO gene mutation positive  Patient is a 29 year old female presenting for a consult for increased risk of breast cancer secondary to CARLOS ALBERTO mutation. She was referred by oncology genetics. She has no family history of breast cancer. Her father was dx with prostate cancer at 45 y.o and unfortunately passed from metastatic disease. We discussed her increased cancer risk. The lifetime risk for breast cancer is between 21-24%. The lifetime risk for pancreatic cancer is approximately 5-10%. There is also evidence to suggest an association between CARLOS ALBERTO and other cancers including stomach, bladder, and colon (5-10%), although available evidence is insufficient to make a determination regarding these relationships. Per current NCCN guidelines, we will plan to initiate annual mammography starting at 40 years of age and breast MRI with and without contrast starting at age 35, unless otherwise indicated. She was referred to gastroenterology for her pancreatic ca risk. Pancreatic cancer screening typically begins at age 50. I will plan to see her annually for cbe. I discussed modified lifestyle changes to reduce her cancer risk including maintaining a healthy weight, getting regular exercise, eating a healthy diet, and limiting alcohol use. I discussed abnormal findings on a breast exam. There were no concerns on her breast exam today. I will see the patient back in 1 year or sooner should the need arise. She was instructed to call with any questions or concerns prior to this time. All questions were answered today.    Orders:  •  Ambulatory Referral to Surgical Oncology        History of Present Illness   Jj Meier is a 29 y.o. year old female who presents for a consult  "for increased risk of breast cancer secondary to CARLOS ALBERTO mutation. Her father was dx with prostate cancer at 45 y.o and unfortunately passed from metastatic disease. Her paternal grandmother had metastatic lung cancer. Her paternal aunt has an CARLOS ALBERTO mutation and her 3 children are + for fanconi anemina (c). Patients age of menarche was 12 y.o. Patient states she just found out she was pregnant with her second child last night. She had 1 live birth. She is pre-menopausal. She has never been dx with cancer.  She is not of Ashkenazi Evangelical descent.       Review of Systems   Constitutional:  Negative for activity change, appetite change, fatigue and unexpected weight change.   Respiratory:  Negative for cough and shortness of breath.    Cardiovascular:  Negative for chest pain.   Gastrointestinal:  Negative for abdominal pain, diarrhea, nausea and vomiting.   Endocrine: Negative for heat intolerance.   Musculoskeletal:  Negative for arthralgias, back pain and myalgias.   Skin:  Negative for rash.   Neurological:  Negative for weakness and headaches.   Hematological:  Negative for adenopathy.    A complete review of systems is negative other than that noted above in the HPI.         Objective   /77 (BP Location: Left arm, Patient Position: Sitting, Cuff Size: Standard)   Pulse 74   Temp (!) 97.2 °F (36.2 °C) (Temporal)   Resp 14   Ht 5' 7\" (1.702 m)   Wt 70.3 kg (155 lb)   SpO2 99%   BMI 24.28 kg/m²     Pain Screening:  Pain Score: 0-No pain  ECOG    Physical Exam  Constitutional:       General: She is not in acute distress.     Appearance: Normal appearance.   Cardiovascular:      Rate and Rhythm: Normal rate and regular rhythm.      Pulses: Normal pulses.      Heart sounds: Normal heart sounds.   Pulmonary:      Effort: Pulmonary effort is normal.      Breath sounds: Normal breath sounds.   Chest:      Chest wall: No mass.   Breasts:     Right: No swelling, bleeding, inverted nipple, mass, nipple discharge, skin " change or tenderness.      Left: No swelling, bleeding, inverted nipple, mass, nipple discharge, skin change or tenderness.      Comments: No masses, nodularity, skin changes, nipple changes or discharge, or adenopathy appreciated on physical exam.       Abdominal:      General: Abdomen is flat.      Palpations: Abdomen is soft.   Lymphadenopathy:      Upper Body:      Right upper body: No supraclavicular, axillary or pectoral adenopathy.      Left upper body: No supraclavicular, axillary or pectoral adenopathy.   Skin:     General: Skin is warm.   Neurological:      General: No focal deficit present.      Mental Status: She is alert and oriented to person, place, and time.   Psychiatric:         Mood and Affect: Mood normal.         Behavior: Behavior normal.         No visits with results within 1 Month(s) from this visit.   Latest known visit with results is:   Appointment on 11/06/2024   Component Date Value Ref Range Status   • Miscellaneous Lab Test Result 11/06/2024    Final    Specimen collection Kit. Collection only test, results will be provided by 3rd party Reference lab directly to ordering provider.

## 2025-01-08 ENCOUNTER — RESULTS FOLLOW-UP (OUTPATIENT)
Dept: OBGYN CLINIC | Facility: CLINIC | Age: 30
End: 2025-01-08

## 2025-01-08 RX ORDER — PROGESTERONE 200 MG/1
200 CAPSULE ORAL
Qty: 30 CAPSULE | Refills: 6 | Status: SHIPPED | OUTPATIENT
Start: 2025-01-08

## 2025-01-09 ENCOUNTER — LAB (OUTPATIENT)
Dept: LAB | Facility: HOSPITAL | Age: 30
End: 2025-01-09
Payer: COMMERCIAL

## 2025-01-09 DIAGNOSIS — Z34.90 EARLY STAGE OF PREGNANCY: Primary | ICD-10-CM

## 2025-01-09 DIAGNOSIS — Z32.01 POSITIVE PREGNANCY TEST: ICD-10-CM

## 2025-01-09 LAB
B-HCG SERPL-ACNC: 244.6 MIU/ML (ref 0–5)
PROGEST SERPL-MCNC: 28.24 NG/ML

## 2025-01-09 PROCEDURE — 36415 COLL VENOUS BLD VENIPUNCTURE: CPT

## 2025-01-09 PROCEDURE — 84702 CHORIONIC GONADOTROPIN TEST: CPT

## 2025-01-09 PROCEDURE — 84144 ASSAY OF PROGESTERONE: CPT

## 2025-01-10 ENCOUNTER — RESULTS FOLLOW-UP (OUTPATIENT)
Dept: OTHER | Facility: HOSPITAL | Age: 30
End: 2025-01-10

## 2025-01-15 ENCOUNTER — APPOINTMENT (OUTPATIENT)
Dept: LAB | Facility: CLINIC | Age: 30
End: 2025-01-15
Payer: COMMERCIAL

## 2025-01-15 DIAGNOSIS — Z34.90 EARLY STAGE OF PREGNANCY: ICD-10-CM

## 2025-01-15 LAB
B-HCG SERPL-ACNC: 2033 MIU/ML (ref 0–5)
PROGEST SERPL-MCNC: 28.94 NG/ML

## 2025-01-15 PROCEDURE — 84702 CHORIONIC GONADOTROPIN TEST: CPT

## 2025-01-15 PROCEDURE — 84144 ASSAY OF PROGESTERONE: CPT

## 2025-01-15 PROCEDURE — 36415 COLL VENOUS BLD VENIPUNCTURE: CPT

## 2025-01-16 ENCOUNTER — DOCUMENTATION (OUTPATIENT)
Dept: OBGYN CLINIC | Facility: CLINIC | Age: 30
End: 2025-01-16

## 2025-01-16 ENCOUNTER — RESULTS FOLLOW-UP (OUTPATIENT)
Dept: OBGYN CLINIC | Facility: CLINIC | Age: 30
End: 2025-01-16

## 2025-01-16 DIAGNOSIS — Z34.90 EARLY STAGE OF PREGNANCY: Primary | ICD-10-CM

## 2025-01-19 ENCOUNTER — LAB (OUTPATIENT)
Dept: LAB | Facility: HOSPITAL | Age: 30
End: 2025-01-19
Payer: COMMERCIAL

## 2025-01-19 ENCOUNTER — DOCUMENTATION (OUTPATIENT)
Dept: LABOR AND DELIVERY | Facility: HOSPITAL | Age: 30
End: 2025-01-19

## 2025-01-19 DIAGNOSIS — Z34.90 EARLY STAGE OF PREGNANCY: Primary | ICD-10-CM

## 2025-01-19 DIAGNOSIS — Z34.90 EARLY STAGE OF PREGNANCY: ICD-10-CM

## 2025-01-19 DIAGNOSIS — O36.80X0 PREGNANCY WITH UNCERTAIN FETAL VIABILITY, SINGLE OR UNSPECIFIED FETUS: Primary | ICD-10-CM

## 2025-01-19 LAB
B-HCG SERPL-ACNC: 4337.1 MIU/ML (ref 0–5)
PROGEST SERPL-MCNC: 7.16 NG/ML

## 2025-01-19 PROCEDURE — 84144 ASSAY OF PROGESTERONE: CPT

## 2025-01-19 PROCEDURE — 36415 COLL VENOUS BLD VENIPUNCTURE: CPT

## 2025-01-19 PROCEDURE — 84702 CHORIONIC GONADOTROPIN TEST: CPT

## 2025-01-20 ENCOUNTER — HOSPITAL ENCOUNTER (OUTPATIENT)
Dept: ULTRASOUND IMAGING | Facility: HOSPITAL | Age: 30
Discharge: HOME | End: 2025-01-20
Attending: OBSTETRICS & GYNECOLOGY
Payer: COMMERCIAL

## 2025-01-20 ENCOUNTER — RESULTS FOLLOW-UP (OUTPATIENT)
Dept: OBGYN CLINIC | Facility: CLINIC | Age: 30
End: 2025-01-20

## 2025-01-20 DIAGNOSIS — O36.80X0 ENCOUNTER TO DETERMINE FETAL VIABILITY OF PREGNANCY, SINGLE OR UNSPECIFIED FETUS: Primary | ICD-10-CM

## 2025-01-20 DIAGNOSIS — O36.80X0 PREGNANCY WITH UNCERTAIN FETAL VIABILITY, SINGLE OR UNSPECIFIED FETUS: ICD-10-CM

## 2025-01-20 PROCEDURE — 76815 OB US LIMITED FETUS(S): CPT

## 2025-01-20 NOTE — TELEPHONE ENCOUNTER
Reviewed ultrasound results with patient.  Repeat ultrasound in 7 to 10 days.  Repeat hCG and progesterone tomorrow.

## 2025-01-21 ENCOUNTER — RESULTS FOLLOW-UP (OUTPATIENT)
Dept: OBGYN CLINIC | Facility: CLINIC | Age: 30
End: 2025-01-21

## 2025-01-21 ENCOUNTER — APPOINTMENT (OUTPATIENT)
Dept: LAB | Facility: HOSPITAL | Age: 30
End: 2025-01-21
Payer: COMMERCIAL

## 2025-01-21 DIAGNOSIS — Z34.90 EARLY STAGE OF PREGNANCY: ICD-10-CM

## 2025-01-21 DIAGNOSIS — O36.80X0 ENCOUNTER TO DETERMINE FETAL VIABILITY OF PREGNANCY, SINGLE OR UNSPECIFIED FETUS: Primary | ICD-10-CM

## 2025-01-21 LAB
B-HCG SERPL-ACNC: 5759.9 MIU/ML (ref 0–5)
PROGEST SERPL-MCNC: 24.4 NG/ML

## 2025-01-21 PROCEDURE — 84702 CHORIONIC GONADOTROPIN TEST: CPT

## 2025-01-21 PROCEDURE — 36415 COLL VENOUS BLD VENIPUNCTURE: CPT

## 2025-01-21 PROCEDURE — 84144 ASSAY OF PROGESTERONE: CPT

## 2025-01-21 NOTE — PROGRESS NOTES
Future Appointments   Date Time Provider Department Center   1/8/2026  2:30 PM MANUELA Kerr [facility]

## 2025-01-27 ENCOUNTER — HOSPITAL ENCOUNTER (OUTPATIENT)
Dept: RADIOLOGY | Facility: HOSPITAL | Age: 30
Discharge: HOME | End: 2025-01-27
Attending: OBSTETRICS & GYNECOLOGY
Payer: COMMERCIAL

## 2025-01-27 ENCOUNTER — LAB (OUTPATIENT)
Dept: LAB | Facility: CLINIC | Age: 30
End: 2025-01-27
Payer: COMMERCIAL

## 2025-01-27 ENCOUNTER — RESULTS FOLLOW-UP (OUTPATIENT)
Dept: OBGYN CLINIC | Facility: CLINIC | Age: 30
End: 2025-01-27

## 2025-01-27 ENCOUNTER — DOCUMENTATION (OUTPATIENT)
Dept: OBGYN CLINIC | Facility: CLINIC | Age: 30
End: 2025-01-27

## 2025-01-27 DIAGNOSIS — O36.80X0 ENCOUNTER TO DETERMINE FETAL VIABILITY OF PREGNANCY, SINGLE OR UNSPECIFIED FETUS: ICD-10-CM

## 2025-01-27 DIAGNOSIS — O36.80X0 ENCOUNTER TO DETERMINE FETAL VIABILITY OF PREGNANCY, SINGLE OR UNSPECIFIED FETUS: Primary | ICD-10-CM

## 2025-01-27 LAB
B-HCG SERPL-ACNC: ABNORMAL MIU/ML (ref 0–5)
PROGEST SERPL-MCNC: 23.93 NG/ML

## 2025-01-27 PROCEDURE — 84144 ASSAY OF PROGESTERONE: CPT

## 2025-01-27 PROCEDURE — 36415 COLL VENOUS BLD VENIPUNCTURE: CPT

## 2025-01-27 PROCEDURE — 76815 OB US LIMITED FETUS(S): CPT

## 2025-01-27 PROCEDURE — 84702 CHORIONIC GONADOTROPIN TEST: CPT

## 2025-01-28 NOTE — PROGRESS NOTES
Pt was made aware repeat us next week ended and repeat labs. She understands no formal diagnosis of missed  at this time but embryo should be seen next week and if non viable pregnancy confirmed, will discuss options for Treatment

## 2025-02-03 ENCOUNTER — RESULTS FOLLOW-UP (OUTPATIENT)
Dept: OBGYN CLINIC | Facility: CLINIC | Age: 30
End: 2025-02-03

## 2025-02-03 ENCOUNTER — LAB (OUTPATIENT)
Dept: LAB | Facility: HOSPITAL | Age: 30
End: 2025-02-03
Payer: COMMERCIAL

## 2025-02-03 ENCOUNTER — TELEMEDICINE (OUTPATIENT)
Dept: OBGYN CLINIC | Facility: CLINIC | Age: 30
End: 2025-02-03
Payer: COMMERCIAL

## 2025-02-03 ENCOUNTER — HOSPITAL ENCOUNTER (OUTPATIENT)
Dept: ULTRASOUND IMAGING | Facility: HOSPITAL | Age: 30
Discharge: HOME | End: 2025-02-03
Attending: OBSTETRICS & GYNECOLOGY
Payer: COMMERCIAL

## 2025-02-03 DIAGNOSIS — O02.1 MISSED ABORTION: Primary | ICD-10-CM

## 2025-02-03 DIAGNOSIS — O36.80X0 ENCOUNTER TO DETERMINE FETAL VIABILITY OF PREGNANCY, SINGLE OR UNSPECIFIED FETUS: ICD-10-CM

## 2025-02-03 LAB
ABO GROUP BLD: NORMAL
B-HCG SERPL-ACNC: ABNORMAL MIU/ML (ref 0–5)
BLD GP AB SCN SERPL QL: NEGATIVE
ERYTHROCYTE [DISTWIDTH] IN BLOOD BY AUTOMATED COUNT: 11.7 % (ref 11.6–15.1)
HCT VFR BLD AUTO: 39.4 % (ref 34.8–46.1)
HGB BLD-MCNC: 13 G/DL (ref 11.5–15.4)
MCH RBC QN AUTO: 30 PG (ref 26.8–34.3)
MCHC RBC AUTO-ENTMCNC: 33 G/DL (ref 31.4–37.4)
MCV RBC AUTO: 91 FL (ref 82–98)
PLATELET # BLD AUTO: 157 THOUSANDS/UL (ref 149–390)
PMV BLD AUTO: 9.7 FL (ref 8.9–12.7)
RBC # BLD AUTO: 4.33 MILLION/UL (ref 3.81–5.12)
RH BLD: POSITIVE
SPECIMEN EXPIRATION DATE: NORMAL
WBC # BLD AUTO: 2.84 THOUSAND/UL (ref 4.31–10.16)

## 2025-02-03 PROCEDURE — 84702 CHORIONIC GONADOTROPIN TEST: CPT

## 2025-02-03 PROCEDURE — 99214 OFFICE O/P EST MOD 30 MIN: CPT | Performed by: OBSTETRICS & GYNECOLOGY

## 2025-02-03 PROCEDURE — 86900 BLOOD TYPING SEROLOGIC ABO: CPT

## 2025-02-03 PROCEDURE — 76815 OB US LIMITED FETUS(S): CPT

## 2025-02-03 PROCEDURE — 86901 BLOOD TYPING SEROLOGIC RH(D): CPT

## 2025-02-03 PROCEDURE — 36415 COLL VENOUS BLD VENIPUNCTURE: CPT

## 2025-02-03 PROCEDURE — 86850 RBC ANTIBODY SCREEN: CPT

## 2025-02-03 PROCEDURE — 85027 COMPLETE CBC AUTOMATED: CPT

## 2025-02-03 RX ORDER — ONDANSETRON 2 MG/ML
4 INJECTION INTRAMUSCULAR; INTRAVENOUS ONCE
Status: CANCELLED | OUTPATIENT
Start: 2025-02-05

## 2025-02-03 RX ORDER — DOXYCYCLINE 100 MG/1
200 CAPSULE ORAL ONCE
Status: CANCELLED | OUTPATIENT
Start: 2025-02-05 | End: 2025-02-03

## 2025-02-03 NOTE — PROGRESS NOTES
[facility] OB GYN Department  Surgery Scheduling Sheet    Patient Name: Jj Meier  : 1995    Provider: Stephanie Hall MD     Needed: no; Language: N/A    Procedure: exam under anesthesia and dilation and evacuation    Diagnosis: missed     Special Needs or Equipment: none    Anesthesia: IV sedation with anesthesia    Length of stay: outpatient  Does patient have comorbid conditions that will require close perioperative monitoring prior to safe discharge: no    The patient has comorbid conditions that will require close perioperative monitoring prior to safe discharge, including N/A.   This may require acute care beyond the usual and routine recovery period. As such, inpatient admission post-operatively is expected and appropriate, and anticipated hospital length of stay will be >2 midnights.    Pre-Admission Testing Needed: no   Labs that should be ordered: NA    Order PAT that is recommended in prep for procedure?: Not Indicated    Medical Clearance Needed: no; Provider: N/A    MA Form Signed (tubals/hysterectomy): Not Indicated    Surgical Drink Given: no     How many days out of work: 1 day(s)     How many days no drivin day(s)       Is pre op appt needed?  no  Interval for post op appt: 2 week(s)       For Surgical Scheduler:     Surgery Scheduled On:  [place]: [facility]    Pre-op Appt:   Post op Appt:  Consult/Medical clearance appt:

## 2025-02-03 NOTE — H&P (VIEW-ONLY)
Virtual Regular Visit  Name: Jj Meier      : 1995      MRN: [id number]  Encounter Provider: Stephanie Hall MD  Encounter Date: 2/3/2025   Encounter department: [facility] OB/GYN COMPLETE WOMEN'S CARE      Verification of patient location:  Patient is located at Home in the following state in which I hold an active license [place] :  Assessment & Plan  Missed     Orders:    Case request operating room: EXAM UNDER ANESTHESIA (EUA), DILATATION AND EVACUATION (D&E) ( 6 WEEKS); Standing    Discussed with patient management options including expectant management, medication uterine evacuation or surgical uterine aspiration.    Discussed surgical management for women would do not want to wait for pregnancy to pass spontaneously or with medication evacuation and who wish to avoid the experience of pain and bleeding that accompanies the passage of the products of conception.    Discussed with patient option for expectant management or medical treatment for women who prefer to avoid surgery.  Discussed with patient that this is an option if she is concerned about potential risks associated from surgery or if she would prefer to pass the pregnancy without instrumentation.    Also discussed that some women may prefer medication rather then expectant management to expedite the process and potentially increasing the likelihood of success.    Discussed with patient advantages of surgical uterine evacuation including shorter time to completion of treatment.  Also discussed that this lowers the risk of unplanned hospital admissions and need for subsequent or emergency treatment.  Discussed with patient potential operative complications with surgery including a 1-2% incidence of surgical complications     Discussed with patient surgical evacuation in detail.  Discussed potential complications including anesthesia related complications, uterine perforation, cervical lacerations and infection.    Patient  prefers surgical treatment.  All questions  were answered.   Also discussed with patient option for manual vacuum aspiration in office which she declines.  She was verbally consented to an exam under anesthesia, D and E  She was counseled on risks associated with D&E including uterine perforation, need for additional procedures such as laparoscopy.  Chlorhexidine wash was discussed with patient.  Preoperative instructions were given.  Follow-up 2 weeks postoperatively.  She is undecided about genetic testing at this time.    Encounter provider Stephanie Hall MD    The patient was identified by name and date of birth. Jj Meier was informed that this is a telemedicine visit and that the visit is being conducted through the Epic Embedded platform. She agrees to proceed..   No one else was in the room.  She acknowledged consent anMy office door was closed.d understanding of privacy and security of the video platform. The patient has agreed to participate and understands they can discontinue the visit at any time.    Patient is aware this is a billable service.     History of Present Illness    HPI    Here to discuss missed .  She has a nonviable pregnancy diagnosed by slowly rising hCGs and ultrasound that shows no development of embryo.    Presents without any bleeding or pain  Review of Systems   Constitutional: Negative.    HENT: Negative.     Eyes: Negative.    Respiratory: Negative.     Cardiovascular: Negative.    Gastrointestinal: Negative.    Endocrine: Negative.    Genitourinary:         As noted in HPI   Musculoskeletal: Negative.    Skin: Negative.    Allergic/Immunologic: Negative.    Neurological: Negative.    Hematological: Negative.    Psychiatric/Behavioral: Negative.         Objective  There were no vitals taken for this visit.    Physical Exam  Constitutional:       General: She is not in acute distress.     Appearance: She is well-developed.   HENT:      Head: Normocephalic and  atraumatic.      Mouth/Throat:      Mouth: Mucous membranes are moist.   Eyes:      Conjunctiva/sclera: Conjunctivae normal.   Cardiovascular:      Rate and Rhythm: Normal rate and regular rhythm.      Pulses: Normal pulses.      Heart sounds: Normal heart sounds.   Pulmonary:      Effort: Pulmonary effort is normal. No respiratory distress.      Breath sounds: Normal breath sounds.   Abdominal:      General: Abdomen is flat. There is no distension.      Palpations: Abdomen is soft.      Tenderness: There is no abdominal tenderness.   Genitourinary:     General: Normal vulva.      Vagina: No vaginal discharge.   Musculoskeletal:      Cervical back: Normal range of motion.   Skin:     Coloration: Skin is not pale.      Findings: No erythema or rash.   Neurological:      Mental Status: She is alert and oriented to person, place, and time.   Psychiatric:         Mood and Affect: Mood normal.         Behavior: Behavior normal.         Thought Content: Thought content normal.         Judgment: Judgment normal.       US OB pregnancy limited with transvaginal  Status: Final result     PACS Images     Show images for US OB pregnancy limited with transvaginal  Study Result    Narrative & Impression   FIRST TRIMESTER OBSTETRIC ULTRASOUND, COMPLETE     INDICATION:  29 years old.  O36.80X0: Pregnancy with inconclusive fetal viability, not applicable or unspecified.     COMPARISON: 1/27/2025 and 1/20/2025     TECHNIQUE:  Transabdominal ultrasound of the pelvis was performed. Additional transvaginal imaging was then performed to better assess gestation, myometrial/endometrial architecture and ovarian parenchymal detail.  The study includes volumetric sweeps   and traditional still imaging technique.     FINDINGS:     UTERUS:  The uterus is anteverted in position, measuring 7.1 x 5.9 x 5.0 cm.  Contour and echotexture appear unremarkable.  The cervix shows no suspicious abnormality.     ENDOMETRIUM:  There is a single gestational  sac within the endometrium, with a mean sac diameter of 11 mm. This previously measured 10 mm on the immediate prior study and 7 mm on 2025. A yolk sac is present, measuring 3 mm in diameter, slightly enlarged since   2025. However, this is unchanged from the immediate prior study.     OVARIES/ADNEXA:  Right ovary: 2.5 x 1.1 x 2.3 mm.  No suspicious right ovarian abnormality.     Left ovary: 3.0 x 2.5 x 2.1 mm.  No suspicious left ovarian abnormality.     No suspicious adnexal mass or loculated collections.     No abnormal free fluid in the cul-de-sac.     IMPRESSION:     1.  There is a single intrauterine gestational sac and yolk sac, without a visible embryo. Given the absence of an embryo with a heartbeat, greater than or equal to 11 days after the initial scan demonstrating a yolk sac, the findings are diagnostic of   early pregnancy failure.           Workstation performed: [id number]        Visit Time  Total Visit Duration: 11 mins    [facility] GYN Department  Surgery Scheduling Sheet    Patient Name: Jj Meier  : 1995    Provider: Stephanie Hall MD     Needed: no; Language: N/A    Procedure: exam under anesthesia and dilation and evacuation    Diagnosis: missed     Special Needs or Equipment: none    Anesthesia: IV sedation with anesthesia    Length of stay: outpatient  Does patient have comorbid conditions that will require close perioperative monitoring prior to safe discharge: no    The patient has comorbid conditions that will require close perioperative monitoring prior to safe discharge, including N/A.   This may require acute care beyond the usual and routine recovery period. As such, inpatient admission post-operatively is expected and appropriate, and anticipated hospital length of stay will be >2 midnights.    Pre-Admission Testing Needed: no   Labs that should be ordered: NA    Order PAT that is recommended in prep for procedure?: Not  Indicated    Medical Clearance Needed: no; Provider: N/A    MA Form Signed (tubals/hysterectomy): Not Indicated    Surgical Drink Given: no     How many days out of work: 1 day(s)     How many days no drivin day(s)       Is pre op appt needed?  no  Interval for post op appt: 2 week(s)       For Surgical Scheduler:     Surgery Scheduled On:  [place]: [facility]    Pre-op Appt:   Post op Appt:  Consult/Medical clearance appt:

## 2025-02-03 NOTE — H&P
Virtual Regular Visit  Name: Jj Meier      : 1995      MRN: [id number]  Encounter Provider: Stephanie Hall MD  Encounter Date: 2/3/2025   Encounter department: [facility] OB/GYN COMPLETE WOMEN'S CARE      Verification of patient location:  Patient is located at Home in the following state in which I hold an active license [place] :  Assessment & Plan  Missed     Orders:    Case request operating room: EXAM UNDER ANESTHESIA (EUA), DILATATION AND EVACUATION (D&E) ( 6 WEEKS); Standing    Discussed with patient management options including expectant management, medication uterine evacuation or surgical uterine aspiration.    Discussed surgical management for women would do not want to wait for pregnancy to pass spontaneously or with medication evacuation and who wish to avoid the experience of pain and bleeding that accompanies the passage of the products of conception.    Discussed with patient option for expectant management or medical treatment for women who prefer to avoid surgery.  Discussed with patient that this is an option if she is concerned about potential risks associated from surgery or if she would prefer to pass the pregnancy without instrumentation.    Also discussed that some women may prefer medication rather then expectant management to expedite the process and potentially increasing the likelihood of success.    Discussed with patient advantages of surgical uterine evacuation including shorter time to completion of treatment.  Also discussed that this lowers the risk of unplanned hospital admissions and need for subsequent or emergency treatment.  Discussed with patient potential operative complications with surgery including a 1-2% incidence of surgical complications     Discussed with patient surgical evacuation in detail.  Discussed potential complications including anesthesia related complications, uterine perforation, cervical lacerations and infection.    Patient  prefers surgical treatment.  All questions  were answered.   Also discussed with patient option for manual vacuum aspiration in office which she declines.  She was verbally consented to an exam under anesthesia, D and E  She was counseled on risks associated with D&E including uterine perforation, need for additional procedures such as laparoscopy.  Chlorhexidine wash was discussed with patient.  Preoperative instructions were given.  Follow-up 2 weeks postoperatively.  She is undecided about genetic testing at this time.    Encounter provider Stephanie Hall MD    The patient was identified by name and date of birth. Jj Meier was informed that this is a telemedicine visit and that the visit is being conducted through the Epic Embedded platform. She agrees to proceed..   No one else was in the room.  She acknowledged consent anMy office door was closed.d understanding of privacy and security of the video platform. The patient has agreed to participate and understands they can discontinue the visit at any time.    Patient is aware this is a billable service.     History of Present Illness    HPI    Here to discuss missed .  She has a nonviable pregnancy diagnosed by slowly rising hCGs and ultrasound that shows no development of embryo.    Presents without any bleeding or pain  Review of Systems   Constitutional: Negative.    HENT: Negative.     Eyes: Negative.    Respiratory: Negative.     Cardiovascular: Negative.    Gastrointestinal: Negative.    Endocrine: Negative.    Genitourinary:         As noted in HPI   Musculoskeletal: Negative.    Skin: Negative.    Allergic/Immunologic: Negative.    Neurological: Negative.    Hematological: Negative.    Psychiatric/Behavioral: Negative.         Objective  There were no vitals taken for this visit.    Physical Exam  Constitutional:       General: She is not in acute distress.     Appearance: She is well-developed.   HENT:      Head: Normocephalic and  atraumatic.      Mouth/Throat:      Mouth: Mucous membranes are moist.   Eyes:      Conjunctiva/sclera: Conjunctivae normal.   Cardiovascular:      Rate and Rhythm: Normal rate and regular rhythm.      Pulses: Normal pulses.      Heart sounds: Normal heart sounds.   Pulmonary:      Effort: Pulmonary effort is normal. No respiratory distress.      Breath sounds: Normal breath sounds.   Abdominal:      General: Abdomen is flat. There is no distension.      Palpations: Abdomen is soft.      Tenderness: There is no abdominal tenderness.   Genitourinary:     General: Normal vulva.      Vagina: No vaginal discharge.   Musculoskeletal:      Cervical back: Normal range of motion.   Skin:     Coloration: Skin is not pale.      Findings: No erythema or rash.   Neurological:      Mental Status: She is alert and oriented to person, place, and time.   Psychiatric:         Mood and Affect: Mood normal.         Behavior: Behavior normal.         Thought Content: Thought content normal.         Judgment: Judgment normal.       US OB pregnancy limited with transvaginal  Status: Final result     PACS Images     Show images for US OB pregnancy limited with transvaginal  Study Result    Narrative & Impression   FIRST TRIMESTER OBSTETRIC ULTRASOUND, COMPLETE     INDICATION:  29 years old.  O36.80X0: Pregnancy with inconclusive fetal viability, not applicable or unspecified.     COMPARISON: 1/27/2025 and 1/20/2025     TECHNIQUE:  Transabdominal ultrasound of the pelvis was performed. Additional transvaginal imaging was then performed to better assess gestation, myometrial/endometrial architecture and ovarian parenchymal detail.  The study includes volumetric sweeps   and traditional still imaging technique.     FINDINGS:     UTERUS:  The uterus is anteverted in position, measuring 7.1 x 5.9 x 5.0 cm.  Contour and echotexture appear unremarkable.  The cervix shows no suspicious abnormality.     ENDOMETRIUM:  There is a single gestational  sac within the endometrium, with a mean sac diameter of 11 mm. This previously measured 10 mm on the immediate prior study and 7 mm on 2025. A yolk sac is present, measuring 3 mm in diameter, slightly enlarged since   2025. However, this is unchanged from the immediate prior study.     OVARIES/ADNEXA:  Right ovary: 2.5 x 1.1 x 2.3 mm.  No suspicious right ovarian abnormality.     Left ovary: 3.0 x 2.5 x 2.1 mm.  No suspicious left ovarian abnormality.     No suspicious adnexal mass or loculated collections.     No abnormal free fluid in the cul-de-sac.     IMPRESSION:     1.  There is a single intrauterine gestational sac and yolk sac, without a visible embryo. Given the absence of an embryo with a heartbeat, greater than or equal to 11 days after the initial scan demonstrating a yolk sac, the findings are diagnostic of   early pregnancy failure.           Workstation performed: [id number]        Visit Time  Total Visit Duration: 11 mins    [facility] GYN Department  Surgery Scheduling Sheet    Patient Name: Jj Meier  : 1995    Provider: Stephanie Hall MD     Needed: no; Language: N/A    Procedure: exam under anesthesia and dilation and evacuation    Diagnosis: missed     Special Needs or Equipment: none    Anesthesia: IV sedation with anesthesia    Length of stay: outpatient  Does patient have comorbid conditions that will require close perioperative monitoring prior to safe discharge: no    The patient has comorbid conditions that will require close perioperative monitoring prior to safe discharge, including N/A.   This may require acute care beyond the usual and routine recovery period. As such, inpatient admission post-operatively is expected and appropriate, and anticipated hospital length of stay will be >2 midnights.    Pre-Admission Testing Needed: no   Labs that should be ordered: NA    Order PAT that is recommended in prep for procedure?: Not  Indicated    Medical Clearance Needed: no; Provider: N/A    MA Form Signed (tubals/hysterectomy): Not Indicated    Surgical Drink Given: no     How many days out of work: 1 day(s)     How many days no drivin day(s)       Is pre op appt needed?  no  Interval for post op appt: 2 week(s)       For Surgical Scheduler:     Surgery Scheduled On:  [place]: [facility]    Pre-op Appt:   Post op Appt:  Consult/Medical clearance appt:

## 2025-02-03 NOTE — PROGRESS NOTES
Virtual Regular Visit  Name: Jj Meier      : 1995      MRN: [id number]  Encounter Provider: Stephanie Hall MD  Encounter Date: 2/3/2025   Encounter department: [facility] OB/GYN COMPLETE WOMEN'S CARE      Verification of patient location:  Patient is located at Home in the following state in which I hold an active license [place] :  Assessment & Plan  Missed     Orders:    Case request operating room: EXAM UNDER ANESTHESIA (EUA), DILATATION AND EVACUATION (D&E) ( 6 WEEKS); Standing    Discussed with patient management options including expectant management, medication uterine evacuation or surgical uterine aspiration.    Discussed surgical management for women would do not want to wait for pregnancy to pass spontaneously or with medication evacuation and who wish to avoid the experience of pain and bleeding that accompanies the passage of the products of conception.    Discussed with patient option for expectant management or medical treatment for women who prefer to avoid surgery.  Discussed with patient that this is an option if she is concerned about potential risks associated from surgery or if she would prefer to pass the pregnancy without instrumentation.    Also discussed that some women may prefer medication rather then expectant management to expedite the process and potentially increasing the likelihood of success.    Discussed with patient advantages of surgical uterine evacuation including shorter time to completion of treatment.  Also discussed that this lowers the risk of unplanned hospital admissions and need for subsequent or emergency treatment.  Discussed with patient potential operative complications with surgery including a 1-2% incidence of surgical complications     Discussed with patient surgical evacuation in detail.  Discussed potential complications including anesthesia related complications, uterine perforation, cervical lacerations and infection.    Patient  prefers surgical treatment.  All questions  were answered.   Also discussed with patient option for manual vacuum aspiration in office which she declines.  She was verbally consented to an exam under anesthesia, D and E  She was counseled on risks associated with D&E including uterine perforation, need for additional procedures such as laparoscopy.  Chlorhexidine wash was discussed with patient.  Preoperative instructions were given.  Follow-up 2 weeks postoperatively.  She is undecided about genetic testing at this time.    Encounter provider Stephanie Hall MD    The patient was identified by name and date of birth. Jj Meier was informed that this is a telemedicine visit and that the visit is being conducted through the Epic Embedded platform. She agrees to proceed..   No one else was in the room.  She acknowledged consent anMy office door was closed.d understanding of privacy and security of the video platform. The patient has agreed to participate and understands they can discontinue the visit at any time.    Patient is aware this is a billable service.     History of Present Illness     HPI    Here to discuss missed .  She has a nonviable pregnancy diagnosed by slowly rising hCGs and ultrasound that shows no development of embryo.    Presents without any bleeding or pain  Review of Systems   Constitutional: Negative.    HENT: Negative.     Eyes: Negative.    Respiratory: Negative.     Cardiovascular: Negative.    Gastrointestinal: Negative.    Endocrine: Negative.    Genitourinary:         As noted in HPI   Musculoskeletal: Negative.    Skin: Negative.    Allergic/Immunologic: Negative.    Neurological: Negative.    Hematological: Negative.    Psychiatric/Behavioral: Negative.         Objective   There were no vitals taken for this visit.    Physical Exam  Constitutional:       General: She is not in acute distress.     Appearance: She is well-developed.   HENT:      Head: Normocephalic and  atraumatic.      Mouth/Throat:      Mouth: Mucous membranes are moist.   Eyes:      Conjunctiva/sclera: Conjunctivae normal.   Cardiovascular:      Rate and Rhythm: Normal rate and regular rhythm.      Pulses: Normal pulses.      Heart sounds: Normal heart sounds.   Pulmonary:      Effort: Pulmonary effort is normal. No respiratory distress.      Breath sounds: Normal breath sounds.   Abdominal:      General: Abdomen is flat. There is no distension.      Palpations: Abdomen is soft.      Tenderness: There is no abdominal tenderness.   Genitourinary:     General: Normal vulva.      Vagina: No vaginal discharge.   Musculoskeletal:      Cervical back: Normal range of motion.   Skin:     Coloration: Skin is not pale.      Findings: No erythema or rash.   Neurological:      Mental Status: She is alert and oriented to person, place, and time.   Psychiatric:         Mood and Affect: Mood normal.         Behavior: Behavior normal.         Thought Content: Thought content normal.         Judgment: Judgment normal.       US OB pregnancy limited with transvaginal  Status: Final result     PACS Images     Show images for US OB pregnancy limited with transvaginal  Study Result    Narrative & Impression   FIRST TRIMESTER OBSTETRIC ULTRASOUND, COMPLETE     INDICATION:  29 years old.  O36.80X0: Pregnancy with inconclusive fetal viability, not applicable or unspecified.     COMPARISON: 1/27/2025 and 1/20/2025     TECHNIQUE:  Transabdominal ultrasound of the pelvis was performed. Additional transvaginal imaging was then performed to better assess gestation, myometrial/endometrial architecture and ovarian parenchymal detail.  The study includes volumetric sweeps   and traditional still imaging technique.     FINDINGS:     UTERUS:  The uterus is anteverted in position, measuring 7.1 x 5.9 x 5.0 cm.  Contour and echotexture appear unremarkable.  The cervix shows no suspicious abnormality.     ENDOMETRIUM:  There is a single gestational  sac within the endometrium, with a mean sac diameter of 11 mm. This previously measured 10 mm on the immediate prior study and 7 mm on 1/20/2025. A yolk sac is present, measuring 3 mm in diameter, slightly enlarged since   1/20/2025. However, this is unchanged from the immediate prior study.     OVARIES/ADNEXA:  Right ovary: 2.5 x 1.1 x 2.3 mm.  No suspicious right ovarian abnormality.     Left ovary: 3.0 x 2.5 x 2.1 mm.  No suspicious left ovarian abnormality.     No suspicious adnexal mass or loculated collections.     No abnormal free fluid in the cul-de-sac.     IMPRESSION:     1.  There is a single intrauterine gestational sac and yolk sac, without a visible embryo. Given the absence of an embryo with a heartbeat, greater than or equal to 11 days after the initial scan demonstrating a yolk sac, the findings are diagnostic of   early pregnancy failure.           Workstation performed: [id number]        Visit Time  Total Visit Duration: 11 mins

## 2025-02-04 ENCOUNTER — TELEPHONE (OUTPATIENT)
Dept: OBGYN CLINIC | Facility: CLINIC | Age: 30
End: 2025-02-04

## 2025-02-04 ENCOUNTER — ANESTHESIA EVENT (OUTPATIENT)
Dept: PERIOP | Facility: HOSPITAL | Age: 30
End: 2025-02-04
Payer: COMMERCIAL

## 2025-02-04 RX ORDER — AMOXICILLIN 500 MG/1
500 CAPSULE ORAL 3 TIMES DAILY
COMMUNITY
Start: 2025-01-30 | End: 2025-02-05

## 2025-02-04 NOTE — TELEPHONE ENCOUNTER
[facility] OB GYN Department  Surgery Scheduling Sheet     Patient Name: Jj Meier  : 1995     Provider: Stephanie Hall MD      Needed: no; Language: N/A     Procedure: exam under anesthesia and dilation and evacuation     Diagnosis: missed      Special Needs or Equipment: none     Anesthesia: IV sedation with anesthesia     Length of stay: outpatient  Does patient have comorbid conditions that will require close perioperative monitoring prior to safe discharge: no     The patient has comorbid conditions that will require close perioperative monitoring prior to safe discharge, including N/A.   This may require acute care beyond the usual and routine recovery period. As such, inpatient admission post-operatively is expected and appropriate, and anticipated hospital length of stay will be >2 midnights.     Pre-Admission Testing Needed: no              Labs that should be ordered: NA     Order PAT that is recommended in prep for procedure?: Not Indicated     Medical Clearance Needed: no; Provider: N/A     MA Form Signed (tubals/hysterectomy): Not Indicated     Surgical Drink Given: no      How many days out of work: 1 day(s)     How many days no drivin day(s)        Is pre op appt needed?  no  Interval for post op appt: 2 week(s)         For Surgical Scheduler:      Surgery Scheduled On:  [place]: [facility]     Pre-op Appt:   Post op Appt:  Consult/Medical clearance appt:

## 2025-02-04 NOTE — PRE-PROCEDURE INSTRUCTIONS
Pre-Surgery Instructions:   Medication Instructions    amoxicillin (AMOXIL) 500 mg capsule Hold day of surgery.    cholecalciferol (VITAMIN D3) 25 mcg (1,000 units) tablet Hold day of surgery.    Prenatal Vit-Fe Fumarate-FA (PRENATAL PO) Hold day of surgery.    Probiotic Product (PROBIOTIC DAILY PO) Hold day of surgery.   Medication instructions for day surgery reviewed. Please use only a sip of water to take your instructed medications. Avoid all over the counter vitamins, supplements and NSAIDS for one week prior to surgery per anesthesia guidelines. Tylenol is ok to take as needed.     You will receive a call one business day prior to surgery with an arrival time and hospital directions. If your surgery is scheduled on a Monday, the hospital will be calling you on the Friday prior to your surgery. If you have not heard from anyone by 8pm, please call the hospital supervisor through the hospital  at [phone]. ([facility] [phone] or [place] [phone]).    Do not eat or drink anything after midnight the night before your surgery, including candy, mints, lifesavers, or chewing gum. Do not drink alcohol 24hrs before your surgery. Try not to smoke at least 24hrs before your surgery.       Follow the pre surgery showering instructions as listed in the “My Surgical Experience Booklet” or otherwise provided by your surgeon's office. Do not use a blade to shave the surgical area 1 week before surgery. It is okay to use a clean electric clippers up to 24 hours before surgery. Do not apply any lotions, creams, including makeup, cologne, deodorant, or perfumes after showering on the day of your surgery. Do not use dry shampoo, hair spray, hair gel, or any type of hair products.     No contact lenses, eye make-up, or artificial eyelashes. Remove nail polish, including gel polish, and any artificial, gel, or acrylic nails if possible. Remove all jewelry including rings and body piercing jewelry.     Wear  causal clothing that is easy to take on and off. Consider your type of surgery.    Keep any valuables, jewelry, piercings at home. Please bring any specially ordered equipment (sling, braces) if indicated.    Arrange for a responsible person to drive you to and from the hospital on the day of your surgery. Please confirm the visitor policy for the day of your procedure when you receive your phone call with an arrival time.     Call the surgeon's office with any new illnesses, exposures, or additional questions prior to surgery.    Please reference your “My Surgical Experience Booklet” for additional information to prepare for your upcoming surgery.

## 2025-02-04 NOTE — TELEPHONE ENCOUNTER
Talked to patient she is scheduled for her surgical procedure with Dr. Hall on 2/5/2025. 2 week post op scheduled for 2/19/2025.

## 2025-02-05 ENCOUNTER — HOSPITAL ENCOUNTER (OUTPATIENT)
Facility: HOSPITAL | Age: 30
Setting detail: OUTPATIENT SURGERY
Discharge: HOME | End: 2025-02-05
Attending: OBSTETRICS & GYNECOLOGY | Admitting: OBSTETRICS & GYNECOLOGY
Payer: COMMERCIAL

## 2025-02-05 ENCOUNTER — ANESTHESIA (OUTPATIENT)
Dept: PERIOP | Facility: HOSPITAL | Age: 30
End: 2025-02-05
Payer: COMMERCIAL

## 2025-02-05 VITALS
HEIGHT: 67 IN | OXYGEN SATURATION: 100 % | WEIGHT: 151.68 LBS | RESPIRATION RATE: 15 BRPM | SYSTOLIC BLOOD PRESSURE: 102 MMHG | DIASTOLIC BLOOD PRESSURE: 56 MMHG | HEART RATE: 55 BPM | BODY MASS INDEX: 23.81 KG/M2 | TEMPERATURE: 98 F

## 2025-02-05 DIAGNOSIS — O02.1 MISSED ABORTION: ICD-10-CM

## 2025-02-05 PROBLEM — J06.9 URI (UPPER RESPIRATORY INFECTION): Status: RESOLVED | Noted: 2025-02-05 | Resolved: 2025-02-05

## 2025-02-05 PROBLEM — J06.9 URI (UPPER RESPIRATORY INFECTION): Status: ACTIVE | Noted: 2025-02-05

## 2025-02-05 LAB
ABO GROUP BLD: NORMAL
ALBUMIN SERPL BCG-MCNC: 4.1 G/DL (ref 3.5–5)
ALP SERPL-CCNC: 47 U/L (ref 34–104)
ALT SERPL W P-5'-P-CCNC: 21 U/L (ref 7–52)
ANION GAP SERPL CALCULATED.3IONS-SCNC: 5 MMOL/L (ref 4–13)
AST SERPL W P-5'-P-CCNC: 17 U/L (ref 13–39)
BILIRUB SERPL-MCNC: 0.47 MG/DL (ref 0.2–1)
BLD GP AB SCN SERPL QL: NEGATIVE
BUN SERPL-MCNC: 8 MG/DL (ref 5–25)
CALCIUM SERPL-MCNC: 8.7 MG/DL (ref 8.4–10.2)
CHLORIDE SERPL-SCNC: 105 MMOL/L (ref 96–108)
CO2 SERPL-SCNC: 27 MMOL/L (ref 21–32)
CREAT SERPL-MCNC: 0.67 MG/DL (ref 0.6–1.3)
GFR SERPL CREATININE-BSD FRML MDRD: 119 ML/MIN/1.73SQ M
GLUCOSE P FAST SERPL-MCNC: 80 MG/DL (ref 65–99)
GLUCOSE SERPL-MCNC: 80 MG/DL (ref 65–140)
POTASSIUM SERPL-SCNC: 3.9 MMOL/L (ref 3.5–5.3)
PROT SERPL-MCNC: 6.9 G/DL (ref 6.4–8.4)
RH BLD: POSITIVE
SODIUM SERPL-SCNC: 137 MMOL/L (ref 135–147)
SPECIMEN EXPIRATION DATE: NORMAL

## 2025-02-05 PROCEDURE — 86901 BLOOD TYPING SEROLOGIC RH(D): CPT | Performed by: OBSTETRICS & GYNECOLOGY

## 2025-02-05 PROCEDURE — 86850 RBC ANTIBODY SCREEN: CPT | Performed by: OBSTETRICS & GYNECOLOGY

## 2025-02-05 PROCEDURE — 86900 BLOOD TYPING SEROLOGIC ABO: CPT | Performed by: OBSTETRICS & GYNECOLOGY

## 2025-02-05 PROCEDURE — 80053 COMPREHEN METABOLIC PANEL: CPT | Performed by: OBSTETRICS & GYNECOLOGY

## 2025-02-05 PROCEDURE — 88305 TISSUE EXAM BY PATHOLOGIST: CPT | Performed by: PATHOLOGY

## 2025-02-05 PROCEDURE — 59820 CARE OF MISCARRIAGE: CPT | Performed by: OBSTETRICS & GYNECOLOGY

## 2025-02-05 RX ORDER — FENTANYL CITRATE/PF 50 MCG/ML
25 SYRINGE (ML) INJECTION
Status: DISCONTINUED | OUTPATIENT
Start: 2025-02-05 | End: 2025-02-05 | Stop reason: HOSPADM

## 2025-02-05 RX ORDER — ACETAMINOPHEN 325 MG/1
650 TABLET ORAL EVERY 6 HOURS PRN
Status: DISCONTINUED | OUTPATIENT
Start: 2025-02-05 | End: 2025-02-05 | Stop reason: HOSPADM

## 2025-02-05 RX ORDER — KETOROLAC TROMETHAMINE 30 MG/ML
INJECTION, SOLUTION INTRAMUSCULAR; INTRAVENOUS AS NEEDED
Status: DISCONTINUED | OUTPATIENT
Start: 2025-02-05 | End: 2025-02-05

## 2025-02-05 RX ORDER — ONDANSETRON 2 MG/ML
INJECTION INTRAMUSCULAR; INTRAVENOUS AS NEEDED
Status: DISCONTINUED | OUTPATIENT
Start: 2025-02-05 | End: 2025-02-05

## 2025-02-05 RX ORDER — DEXAMETHASONE SODIUM PHOSPHATE 10 MG/ML
INJECTION, SOLUTION INTRAMUSCULAR; INTRAVENOUS AS NEEDED
Status: DISCONTINUED | OUTPATIENT
Start: 2025-02-05 | End: 2025-02-05

## 2025-02-05 RX ORDER — DOXYCYCLINE 100 MG/1
200 CAPSULE ORAL ONCE
Status: COMPLETED | OUTPATIENT
Start: 2025-02-05 | End: 2025-02-05

## 2025-02-05 RX ORDER — SODIUM CHLORIDE, SODIUM LACTATE, POTASSIUM CHLORIDE, CALCIUM CHLORIDE 600; 310; 30; 20 MG/100ML; MG/100ML; MG/100ML; MG/100ML
125 INJECTION, SOLUTION INTRAVENOUS CONTINUOUS
Status: DISCONTINUED | OUTPATIENT
Start: 2025-02-05 | End: 2025-02-05 | Stop reason: HOSPADM

## 2025-02-05 RX ORDER — ONDANSETRON 2 MG/ML
4 INJECTION INTRAMUSCULAR; INTRAVENOUS ONCE AS NEEDED
Status: DISCONTINUED | OUTPATIENT
Start: 2025-02-05 | End: 2025-02-05 | Stop reason: HOSPADM

## 2025-02-05 RX ORDER — OXYCODONE HYDROCHLORIDE 5 MG/1
5 TABLET ORAL EVERY 4 HOURS PRN
Status: DISCONTINUED | OUTPATIENT
Start: 2025-02-05 | End: 2025-02-05 | Stop reason: HOSPADM

## 2025-02-05 RX ORDER — MIDAZOLAM HYDROCHLORIDE 2 MG/2ML
INJECTION, SOLUTION INTRAMUSCULAR; INTRAVENOUS AS NEEDED
Status: DISCONTINUED | OUTPATIENT
Start: 2025-02-05 | End: 2025-02-05

## 2025-02-05 RX ORDER — IBUPROFEN 200 MG
600 TABLET ORAL EVERY 6 HOURS PRN
Start: 2025-02-05

## 2025-02-05 RX ORDER — PROPOFOL 10 MG/ML
INJECTION, EMULSION INTRAVENOUS AS NEEDED
Status: DISCONTINUED | OUTPATIENT
Start: 2025-02-05 | End: 2025-02-05

## 2025-02-05 RX ORDER — FENTANYL CITRATE 50 UG/ML
INJECTION, SOLUTION INTRAMUSCULAR; INTRAVENOUS AS NEEDED
Status: DISCONTINUED | OUTPATIENT
Start: 2025-02-05 | End: 2025-02-05

## 2025-02-05 RX ORDER — LIDOCAINE HYDROCHLORIDE 20 MG/ML
INJECTION, SOLUTION EPIDURAL; INFILTRATION; INTRACAUDAL; PERINEURAL AS NEEDED
Status: DISCONTINUED | OUTPATIENT
Start: 2025-02-05 | End: 2025-02-05

## 2025-02-05 RX ORDER — ONDANSETRON 2 MG/ML
4 INJECTION INTRAMUSCULAR; INTRAVENOUS ONCE
Status: COMPLETED | OUTPATIENT
Start: 2025-02-05 | End: 2025-02-05

## 2025-02-05 RX ORDER — PROPOFOL 10 MG/ML
INJECTION, EMULSION INTRAVENOUS CONTINUOUS PRN
Status: DISCONTINUED | OUTPATIENT
Start: 2025-02-05 | End: 2025-02-05

## 2025-02-05 RX ADMIN — DOXYCYCLINE HYCLATE 200 MG: 100 CAPSULE ORAL at 12:31

## 2025-02-05 RX ADMIN — KETOROLAC TROMETHAMINE 15 MG: 30 INJECTION, SOLUTION INTRAMUSCULAR; INTRAVENOUS at 13:37

## 2025-02-05 RX ADMIN — ONDANSETRON 4 MG: 2 INJECTION INTRAMUSCULAR; INTRAVENOUS at 13:24

## 2025-02-05 RX ADMIN — DEXAMETHASONE SODIUM PHOSPHATE 10 MG: 10 INJECTION INTRAMUSCULAR; INTRAVENOUS at 13:19

## 2025-02-05 RX ADMIN — FENTANYL CITRATE 25 MCG: 50 INJECTION INTRAMUSCULAR; INTRAVENOUS at 13:20

## 2025-02-05 RX ADMIN — FENTANYL CITRATE 25 MCG: 50 INJECTION INTRAMUSCULAR; INTRAVENOUS at 13:26

## 2025-02-05 RX ADMIN — LIDOCAINE HYDROCHLORIDE 80 MG: 20 INJECTION, SOLUTION EPIDURAL; INFILTRATION; INTRACAUDAL at 13:18

## 2025-02-05 RX ADMIN — MIDAZOLAM 2 MG: 1 INJECTION INTRAMUSCULAR; INTRAVENOUS at 13:14

## 2025-02-05 RX ADMIN — PROPOFOL 100 MCG/KG/MIN: 10 INJECTION, EMULSION INTRAVENOUS at 13:19

## 2025-02-05 RX ADMIN — SODIUM CHLORIDE, SODIUM LACTATE, POTASSIUM CHLORIDE, AND CALCIUM CHLORIDE 125 ML/HR: .6; .31; .03; .02 INJECTION, SOLUTION INTRAVENOUS at 12:30

## 2025-02-05 RX ADMIN — PROPOFOL 200 MG: 10 INJECTION, EMULSION INTRAVENOUS at 13:18

## 2025-02-05 RX ADMIN — FENTANYL CITRATE 25 MCG: 50 INJECTION INTRAMUSCULAR; INTRAVENOUS at 13:23

## 2025-02-05 RX ADMIN — ONDANSETRON 4 MG: 2 INJECTION INTRAMUSCULAR; INTRAVENOUS at 12:31

## 2025-02-05 NOTE — ANESTHESIA POSTPROCEDURE EVALUATION
Post-Op Assessment Note    CV Status:  Stable    Pain management: adequate       Mental Status:  Alert and awake   Hydration Status:  Euvolemic   PONV Controlled:  Controlled   Airway Patency:  Patent     Post Op Vitals Reviewed: Yes    No anethesia notable event occurred.    Staff: Anesthesiologist           Last Filed PACU Vitals:  Vitals Value Taken Time   Temp 97.5 °F (36.4 °C) 02/05/25 1350   Pulse 56 02/05/25 1414   BP 96/61 02/05/25 1405   Resp 17 02/05/25 1414   SpO2 100 % 02/05/25 1414   Vitals shown include unfiled device data.    Modified Josefa:     Vitals Value Taken Time   Activity 2 02/05/25 1350   Respiration 2 02/05/25 1350   Circulation 2 02/05/25 1350   Consciousness 1 02/05/25 1350   Oxygen Saturation 2 02/05/25 1350     Modified Josefa Score: 9

## 2025-02-05 NOTE — INTERVAL H&P NOTE
H&P reviewed. After examining the patient I find no changes in the patients condition since the H&P had been written.    Vitals:    02/05/25 1210   BP: 111/68   Pulse: 65   Resp: 16   Temp: 98.5 °F (36.9 °C)   SpO2: 99%     Final disposition signed - hospital disposition. E-consent signed. She declines genetic testing.

## 2025-02-05 NOTE — QUICK NOTE
Matthew updated on completion of procedure.  Operative course reviewed. Patient advised to contact me personally for any post-op concerns.

## 2025-02-05 NOTE — OP NOTE
OPERATIVE REPORT  PATIENT NAME: Jj Meier    :  1995  MRN: [id number]  Pt Location: [facility] OR ROOM 07    SURGERY DATE: 2025    Surgeons and Role:     * Stephanie Hall MD - Primary     * Jonathon Ramírez MD - Assisting    Preop Diagnosis:  Missed  [O02.1]    Post-Op Diagnosis Codes:     * Missed  [O02.1]    Procedure(s):  (D&E) ( 6 WEEKS). EUA    Specimen(s):  ID Type Source Tests Collected by Time Destination   1 :  Tissue Products of Conception TISSUE EXAM Stephanie Hall MD 2025 [id number]        Estimated Blood Loss:   75 mL    Drains:  * No LDAs found *    Anesthesia Type:   IV Sedation with Anesthesia    Operative Indications:  Missed  [O02.1]      Operative Findings:  Uterus anteverted, 8 week in size  Cervix non dilated         Complications:   None    Procedure and Technique:  The patient was correctly identified. Patient was then taken to the OR where general anesthesia was obtained without difficulty.  She received 200 mg of Doxycyline pre-operatively.    She was placed in the dorsal lithotomy position and was prepped and draped in a sterile fashion.   The patient was examined under anesthesia and was found to have an anteverted uterus enlarged to about 8 weeks with normal adnexa.  A weighted speculum  was inserted into the vagina. The cervix was non-dilated with no products of conception at cervical os.  The anterior lip of the cervix was the grasped with a single-tooth tenaculum.     Using Hanks dilators, the cervix was progressively dilated to admit an 8 mm curved suction curette.     An 8 mm curved suction curette was advanced gently to the uterine fundus.  The suction device was activated and the curette rotated to clear the uterus of the products of conception.    The specimen was sent for routine pathology.     The entire procedure was performed using ultrasound guidance.  The uterus was noted to be empty post procedure.    There was minimal  bleeding noted at the end of the procedure.  The tenaculum was removed.  Bleeding from tenaculum was controlled with direct pressure and Monsel's solution.    The speculum was then removed.  Sponge, and instrument counts were correct times two. The patient tolerated the procedure well.  She was taken to the recovery room in stable condition.       I was present for the entire procedure.    Patient Disposition:  PACU  and hemodynamically stable             SIGNATURE: Stephanie Hall MD  DATE: February 5, 2025  TIME: 1:43 PM

## 2025-02-05 NOTE — ANESTHESIA PREPROCEDURE EVALUATION
Procedure:  (D&E) ( 6 WEEKS), EUA (Uterus)    Relevant Problems   ANESTHESIA (within normal limits)      CARDIO (within normal limits)      ENDO (within normal limits)      GI/HEPATIC   (+) Focal nodular hyperplasia of liver      GYN   (+) Supervision of normal first pregnancy, antepartum      NEURO/PSYCH   (+) SALLIE (generalized anxiety disorder)      PULMONARY   (+) URI (upper respiratory infection) (Flu like symptoms 1 week ago, denies fever or shortness of breath currently, some residual cough, on amoxicillin)        Physical Exam    Airway    Mallampati score: I  TM Distance: >3 FB  Neck ROM: full     Dental        Cardiovascular  Cardiovascular exam normal    Pulmonary  Pulmonary exam normal     Other Findings        Anesthesia Plan  ASA Score- 2     Anesthesia Type- general with ASA Monitors.         Additional Monitors:     Airway Plan: LMA.           Plan Factors-    Chart reviewed.    Patient summary reviewed.                  Induction- intravenous.    Postoperative Plan-         Informed Consent- Anesthetic plan and risks discussed with patient.        NPO Status:  Vitals Value Taken Time   Date of last liquid 02/05/25 02/05/25 1212   Time of last liquid 1000 02/05/25 1212   Date of last solid 02/04/25 02/05/25 1212   Time of last solid 1830 02/05/25 1212

## 2025-02-10 PROCEDURE — 88305 TISSUE EXAM BY PATHOLOGIST: CPT | Performed by: PATHOLOGY

## 2025-02-11 ENCOUNTER — HOSPITAL ENCOUNTER (OUTPATIENT)
Dept: RADIOLOGY | Facility: HOSPITAL | Age: 30
Discharge: HOME | End: 2025-02-11
Payer: COMMERCIAL

## 2025-02-11 DIAGNOSIS — R05.9 COUGH, UNSPECIFIED TYPE: ICD-10-CM

## 2025-02-11 DIAGNOSIS — B34.9 VIRAL SYNDROME: ICD-10-CM

## 2025-02-11 PROCEDURE — 71046 X-RAY EXAM CHEST 2 VIEWS: CPT

## 2025-02-19 ENCOUNTER — OFFICE VISIT (OUTPATIENT)
Dept: OBGYN CLINIC | Facility: CLINIC | Age: 30
End: 2025-02-19

## 2025-02-19 VITALS
DIASTOLIC BLOOD PRESSURE: 80 MMHG | HEIGHT: 67 IN | SYSTOLIC BLOOD PRESSURE: 112 MMHG | BODY MASS INDEX: 24.33 KG/M2 | WEIGHT: 155 LBS

## 2025-02-19 DIAGNOSIS — O02.1 MISSED ABORTION: Primary | ICD-10-CM

## 2025-02-19 PROCEDURE — 99024 POSTOP FOLLOW-UP VISIT: CPT | Performed by: OBSTETRICS & GYNECOLOGY

## 2025-02-19 NOTE — PROGRESS NOTES
"Assessment      Doing well postoperatively.  Operative findings again reviewed. Pathology report discussed.      Plan     1. Continue to monitor menstrual cycles  2. Declines contraception  3. Activity restrictions: none  4. Anticipated return to work: not applicable.  5. Follow up: 1  month  for annual      Problem List Items Addressed This Visit          Obstetrics/Gynecology    Missed  - Primary         Subjective     Jj Meier is a 29 y.o. female who presents to the office 2 weeks status post  D and E  for  missed  . Eating a regular diet without difficulty. Bowel movements are normal. The patient is not having any pain.      She had some bleeding for 4 days, none since  She has no pain  She has no abnormal discharge.      The following portions of the patient's history were reviewed and updated as appropriate: allergies, current medications, past family history, past medical history, past social history, past surgical history, and problem list.    Review of Systems   Constitutional: Negative.    HENT: Negative.     Eyes: Negative.    Respiratory: Negative.     Cardiovascular: Negative.    Gastrointestinal: Negative.    Endocrine: Negative.    Genitourinary:         As noted in HPI   Musculoskeletal: Negative.    Skin: Negative.    Allergic/Immunologic: Negative.    Neurological: Negative.    Hematological: Negative.    Psychiatric/Behavioral: Negative.          Objective     /80 (BP Location: Right arm, Patient Position: Sitting, Cuff Size: Standard)   Ht 5' 7\" (1.702 m)   Wt 70.3 kg (155 lb)   LMP 2024 (Exact Date)   Breastfeeding No   BMI 24.28 kg/m²   General:  alert and oriented, in no acute distress   Abdomen: soft, bowel sounds active, non-tender, no abnormal masses      Cervix: No abnormalities, no abnormal vaginal discharge or bleeding.  No cervical motion tenderness, uterus nontender and normal in size            Future Appointments   Date Time Provider " Department Center   3/14/2025  8:00 AM Stephanie Hall MD Complete  Practice-Wom   2026  2:30 PM MANUELA Kerr SURG ONC [facility]-Onc   Tissue Exam: [id number]  Order: [id number]   Collected 2025  1:34 PM       Status: Final result       Dx: Missed     Test Result Released: No    0 Result Notes      Component  Ref Range & Units (hover)    Case Report   Surgical Pathology Report                         Case: [id number]                                   Authorizing Provider:  Stephanie Hall MD           Collected:           2025 1334               Ordering Location:     [facility]        Received:            2025 [address] [place] [address] Operating Room                                                     Pathologist:           Osmany Berg MD                                                                 Specimen:    Products of Conception                                                                    Final Diagnosis   A.  Products of conception (evacuation):     - Immature chorionic villi and placental tissue.     - Endometrium with gestational-related change, focal infarction and acute inflammation.   Electronically signed by Osmany Berg MD on 2/10/2025 at 0806 EST   Additional Information    All reported additional testing was performed with appropriately reactive controls.  These tests were developed and their performance characteristics determined by [facility] or appropriate performing facility, though some tests may be performed on tissues which have not been validated for performance characteristics (such as staining performed on alcohol exposed cell blocks and decalcified tissues).  Results should be interpreted with caution and in the context of the patients’ clinical condition. These tests may not be cleared or approved by the U.S. Food and Drug Administration, though the FDA has determined that such  "clearance or approval is not necessary. These tests are used for clinical purposes and they should not be regarded as investigational or for research. This laboratory has been approved by CLIA 88, designated as a high-complexity laboratory and is qualified to perform these tests.Interpretation performed at [facility], [address] [place] [address]..   Gross Description    A. The specimen is received in formalin, labeled with the patient's name and hospital number, and is designated \" product of conception\".  The specimen consists of multiple tan-brown mucinous, rubbery and friable tissue fragments measuring in aggregate of 5.0 x 3.8 x 2.2 cm.  There is no distinct fetal tissue grossly identified.  Representative sections. Three cassettes.     Note: The estimated total formalin fixation time based upon information provided by the submitting clinician and the standard processing schedule is under 72 hours.  -Wilson Carlson   Resulting Agency [facility]             Specimen Collected: 02/05/25  1:34 PM Last Resulted: 02/10/25  8:06 AM       Order Details        View Encounter        Lab and Collection Details        Routing        Result History     View All Conversations on this Encounter     Scans on Order [id number]    Lab Result Document - Document on 2/10/2025  8:06 AM           "

## 2025-03-12 NOTE — PROGRESS NOTES
Assessment        Diagnoses and all orders for this visit:    Encntr for gyn exam (general) (routine) w/o abn findings    Cervical cancer screening  -     Liquid-based pap, screening    Other fatigue  -     CBC and Platelet; Future  -     TSH, 3rd generation with Free T4 reflex; Future  -     Ferritin; Future    Cyst of ovary, unspecified laterality  -     US pelvis complete w transvaginal; Future             Plan      All questions answered.  Await pap smear results.  Contraception: none.  Follow up in 1 year.  Follow up as needed.  Pap smear.   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 female who presents for annual exam.      Chief Complaint   Patient presents with    Gynecologic Exam     Pt had a period after miscarriage  Declines contraception  Has CARLOS ALBERTO gene      States she may have an ovarian cyst but no pain now    During her last period she felt really tired    Last Pap: 2022  Last mammogram: Not on file  Colorectal cancer screening: Cologuard: Not on file Colonoscopy: Not on file   DEXA: Not on file     Contraception: condoms  HPV vaccine completed:yes - 3 doses  History of abnormal Pap smear: yes - ASCUS  History of abnormal mammogram: no  Family history of uterine or ovarian cancer: no  Family history of breast cancer: no  Family history of colon cancer: no       OB History    Para Term  AB Living   2 1 1 0 1 1   SAB IAB Ectopic Multiple Live Births   1 0 0 0 1      # Outcome Date GA Lbr Ubaldo/2nd Weight Sex Type Anes PTL Lv   2 SAB 25 6w0d          1 Term 11/15/23 39w1d / 00:30 3275 g (7 lb 3.5 oz) F Vag-Spont EPI N TAMIKA      Name: ADDIS,BABY GIRL (JJ)      Apgar1: 6  Apgar5: 9       Menstrual History:  OB History          2    Para   1    Term   1       0    AB    1    Living   1         SAB   1    IAB   0    Ectopic   0    Multiple   0    Live Births   1                Menarche age: 12  No LMP recorded.       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     Past Surgical History:   Procedure Laterality Date    KNEE SURGERY Left     excision of tumor-benign    LASIK      ID TX MISSED  FIRST TRIMESTER SURGICAL N/A 2025    Procedure: (D&E) ( 6 WEEKS), EUA;  Surgeon: Stephanie Hall MD;  Location: [facility];  Service: Gynecology    TONSILLECTOMY  age 17    WISDOM TOOTH EXTRACTION       Family History   Problem Relation Age of Onset    No Known Problems Mother     Stroke Father     Coronary artery disease Father     Testicular cancer Father     Heart attack Father         Multiple    Royce's disease Father     Leukemia Father         accute myeloid    Cancer Father         Testicular, chest mass, leukemia    Deep vein thrombosis Father         One occurrence approximately ?    Heart disease Father     Prostate cancer Father     No Known Problems Sister     No Known Problems Maternal Grandmother     Lung cancer Paternal Grandmother         non-smoker    Ovarian cancer Paternal Grandmother     Lupus Paternal Grandfather     Ovarian cancer Other     Fanconi anemia Cousin     Fanconi anemia Cousin        Social History     Tobacco Use    Smoking status: Never    Smokeless tobacco: Never   Vaping Use    Vaping status: Never Used   Substance Use Topics    Alcohol use: Yes      Comment: Social    Drug use: No          Current Outpatient Medications:     cholecalciferol (VITAMIN D3) 25 mcg (1,000 units) tablet, , Disp: , Rfl:     EPINEPHrine (EPIPEN) 0.3 mg/0.3 mL SOAJ, Inject 0.3 mL (0.3 mg total) into a muscle once for 1 dose, Disp: 0.6 mL, Rfl: 2    ibuprofen (MOTRIN) 200 mg tablet, Take 3 tablets (600 mg total) by mouth every 6 (six) hours as needed for moderate pain, Disp: , Rfl:     Prenatal Vit-Fe Fumarate-FA (PRENATAL PO), Take by mouth, Disp: , Rfl:     Probiotic Product (PROBIOTIC DAILY PO), Take by mouth Gummy probiotic, Disp: , Rfl:     Allergies   Allergen Reactions    Levofloxacin Hives           Review of Systems   Constitutional: Negative.    HENT: Negative.     Eyes: Negative.    Respiratory: Negative.     Cardiovascular: Negative.    Gastrointestinal: Negative.    Endocrine: Negative.    Genitourinary:         As noted in HPI   Musculoskeletal: Negative.    Skin: Negative.    Allergic/Immunologic: Negative.    Neurological: Negative.    Hematological: Negative.    Psychiatric/Behavioral: Negative.         /70   Wt 70.3 kg (155 lb)   BMI 24.28 kg/m²         Physical Exam  Constitutional:       Appearance: She is well-developed.   Genitourinary:      Vulva, bladder and rectum normal.      No lesions in the vagina.      Genitourinary Comments:         Right Labia: No rash, tenderness, lesions, skin changes or Bartholin's cyst.     Left Labia: No tenderness, lesions, skin changes, Bartholin's cyst or rash.     No inguinal adenopathy present in the right or left side.     No vaginal discharge, tenderness or bleeding.      No vaginal prolapse present.     No vaginal atrophy present.       Right Adnexa: not tender, not full and no mass present.     Left Adnexa: not tender, not full and no mass present.     No cervical motion tenderness, friability, lesion or polyp.      Uterus is not enlarged or tender.      Pelvic exam was performed with patient in the lithotomy  position.   Rectum:      No external hemorrhoid.   Breasts:     Right: No mass, nipple discharge, skin change or tenderness.      Left: No mass, nipple discharge, skin change or tenderness.   HENT:      Head: Normocephalic.      Nose: Nose normal.   Eyes:      Conjunctiva/sclera: Conjunctivae normal.   Neck:      Thyroid: No thyromegaly.   Cardiovascular:      Rate and Rhythm: Normal rate and regular rhythm.      Heart sounds: Normal heart sounds. No murmur heard.  Pulmonary:      Effort: Pulmonary effort is normal. No respiratory distress.      Breath sounds: Normal breath sounds. No wheezing or rales.   Abdominal:      General: There is no distension.      Palpations: Abdomen is soft. There is no mass.      Tenderness: There is no abdominal tenderness. There is no guarding or rebound.   Musculoskeletal:         General: No tenderness.      Cervical back: Neck supple. No muscular tenderness.   Lymphadenopathy:      Cervical: No cervical adenopathy.      Lower Body: No right inguinal adenopathy. No left inguinal adenopathy.   Neurological:      Mental Status: She is alert and oriented to person, place, and time.   Skin:     General: Skin is warm and dry.   Psychiatric:         Mood and Affect: Mood normal.         Behavior: Behavior normal.   Vitals and nursing note reviewed. Exam conducted with a chaperone present.             Future Appointments   Date Time Provider Department Center   1/8/2026  2:30 PM MANUELA Kerr SURG ONC [facility] Practice[facility]Onc

## 2025-03-14 ENCOUNTER — ANNUAL EXAM (OUTPATIENT)
Dept: OBGYN CLINIC | Facility: CLINIC | Age: 30
End: 2025-03-14
Payer: COMMERCIAL

## 2025-03-14 VITALS — WEIGHT: 155 LBS | SYSTOLIC BLOOD PRESSURE: 112 MMHG | BODY MASS INDEX: 24.28 KG/M2 | DIASTOLIC BLOOD PRESSURE: 70 MMHG

## 2025-03-14 DIAGNOSIS — Z15.09 ATM GENE MUTATION POSITIVE: ICD-10-CM

## 2025-03-14 DIAGNOSIS — Z01.419 ENCNTR FOR GYN EXAM (GENERAL) (ROUTINE) W/O ABN FINDINGS: Primary | ICD-10-CM

## 2025-03-14 DIAGNOSIS — N83.209 CYST OF OVARY, UNSPECIFIED LATERALITY: ICD-10-CM

## 2025-03-14 DIAGNOSIS — Z12.4 CERVICAL CANCER SCREENING: ICD-10-CM

## 2025-03-14 DIAGNOSIS — Z15.01 ATM GENE MUTATION POSITIVE: ICD-10-CM

## 2025-03-14 DIAGNOSIS — R53.83 OTHER FATIGUE: ICD-10-CM

## 2025-03-14 PROCEDURE — G0145 SCR C/V CYTO,THINLAYER,RESCR: HCPCS | Performed by: OBSTETRICS & GYNECOLOGY

## 2025-03-14 PROCEDURE — 99395 PREV VISIT EST AGE 18-39: CPT | Performed by: OBSTETRICS & GYNECOLOGY

## 2025-03-20 LAB
LAB AP GYN PRIMARY INTERPRETATION: NORMAL
Lab: NORMAL

## 2025-03-24 ENCOUNTER — RESULTS FOLLOW-UP (OUTPATIENT)
Age: 30
End: 2025-03-24

## 2025-03-27 DIAGNOSIS — Z31.41 FERTILITY TESTING: Primary | ICD-10-CM

## 2025-03-27 NOTE — PROGRESS NOTES
Patient unsure if ovulation kits are showing ovulation.  Progesterone day 21 was ordered including FSH and AMH

## 2025-03-28 ENCOUNTER — APPOINTMENT (OUTPATIENT)
Dept: LAB | Facility: CLINIC | Age: 30
End: 2025-03-28
Payer: COMMERCIAL

## 2025-03-28 DIAGNOSIS — Z13.21 ENCOUNTER FOR VITAMIN DEFICIENCY SCREENING: ICD-10-CM

## 2025-03-28 DIAGNOSIS — Z00.00 ROUTINE GENERAL MEDICAL EXAMINATION AT A HEALTH CARE FACILITY: Primary | ICD-10-CM

## 2025-03-28 DIAGNOSIS — Z13.29 SCREENING FOR THYROID DISORDER: ICD-10-CM

## 2025-03-28 DIAGNOSIS — Z13.9 SCREENING FOR UNSPECIFIED CONDITION: ICD-10-CM

## 2025-03-28 DIAGNOSIS — D64.9 ANEMIA, UNSPECIFIED: ICD-10-CM

## 2025-03-28 DIAGNOSIS — R53.83 OTHER FATIGUE: ICD-10-CM

## 2025-03-28 DIAGNOSIS — Z13.0 SCREENING, DEFICIENCY ANEMIA, IRON: ICD-10-CM

## 2025-03-28 DIAGNOSIS — Z31.41 FERTILITY TESTING: ICD-10-CM

## 2025-03-28 DIAGNOSIS — Z82.49 FAMILY HISTORY OF ISCHEMIC HEART DISEASE: ICD-10-CM

## 2025-03-28 LAB
25(OH)D3 SERPL-MCNC: 46.3 NG/ML (ref 30–100)
ALBUMIN SERPL BCG-MCNC: 4.2 G/DL (ref 3.5–5)
ALP SERPL-CCNC: 47 U/L (ref 34–104)
ALT SERPL W P-5'-P-CCNC: 14 U/L (ref 7–52)
ANION GAP SERPL CALCULATED.3IONS-SCNC: 5 MMOL/L (ref 4–13)
AST SERPL W P-5'-P-CCNC: 16 U/L (ref 13–39)
BILIRUB SERPL-MCNC: 0.46 MG/DL (ref 0.2–1)
BUN SERPL-MCNC: 13 MG/DL (ref 5–25)
CALCIUM SERPL-MCNC: 8.8 MG/DL (ref 8.4–10.2)
CHLORIDE SERPL-SCNC: 104 MMOL/L (ref 96–108)
CHOLEST SERPL-MCNC: 182 MG/DL (ref ?–200)
CO2 SERPL-SCNC: 28 MMOL/L (ref 21–32)
CREAT SERPL-MCNC: 0.76 MG/DL (ref 0.6–1.3)
ERYTHROCYTE [DISTWIDTH] IN BLOOD BY AUTOMATED COUNT: 13.2 % (ref 11.6–15.1)
FERRITIN SERPL-MCNC: 17 NG/ML (ref 11–307)
FSH SERPL-ACNC: 4.9 MIU/ML
GFR SERPL CREATININE-BSD FRML MDRD: 106 ML/MIN/1.73SQ M
GLUCOSE P FAST SERPL-MCNC: 78 MG/DL (ref 65–99)
HCT VFR BLD AUTO: 42.3 % (ref 34.8–46.1)
HDLC SERPL-MCNC: 67 MG/DL
HGB BLD-MCNC: 13.6 G/DL (ref 11.5–15.4)
LDLC SERPL CALC-MCNC: 101 MG/DL (ref 0–100)
MCH RBC QN AUTO: 30.9 PG (ref 26.8–34.3)
MCHC RBC AUTO-ENTMCNC: 32.2 G/DL (ref 31.4–37.4)
MCV RBC AUTO: 96 FL (ref 82–98)
NONHDLC SERPL-MCNC: 115 MG/DL
PLATELET # BLD AUTO: 252 THOUSANDS/UL (ref 149–390)
PMV BLD AUTO: 10.8 FL (ref 8.9–12.7)
POTASSIUM SERPL-SCNC: 4 MMOL/L (ref 3.5–5.3)
PROGEST SERPL-MCNC: 0.26 NG/ML
PROT SERPL-MCNC: 7.2 G/DL (ref 6.4–8.4)
RBC # BLD AUTO: 4.4 MILLION/UL (ref 3.81–5.12)
SODIUM SERPL-SCNC: 137 MMOL/L (ref 135–147)
T4 SERPL-MCNC: 5.67 UG/DL (ref 6.09–12.23)
TRIGL SERPL-MCNC: 68 MG/DL (ref ?–150)
TSH SERPL DL<=0.05 MIU/L-ACNC: 3.18 UIU/ML (ref 0.45–4.5)
WBC # BLD AUTO: 4.77 THOUSAND/UL (ref 4.31–10.16)

## 2025-03-28 PROCEDURE — 36415 COLL VENOUS BLD VENIPUNCTURE: CPT

## 2025-03-28 PROCEDURE — 84443 ASSAY THYROID STIM HORMONE: CPT

## 2025-03-28 PROCEDURE — 83001 ASSAY OF GONADOTROPIN (FSH): CPT

## 2025-03-28 PROCEDURE — 84436 ASSAY OF TOTAL THYROXINE: CPT

## 2025-03-28 PROCEDURE — 82728 ASSAY OF FERRITIN: CPT

## 2025-03-28 PROCEDURE — 80053 COMPREHEN METABOLIC PANEL: CPT

## 2025-03-28 PROCEDURE — 80061 LIPID PANEL: CPT

## 2025-03-28 PROCEDURE — 82306 VITAMIN D 25 HYDROXY: CPT

## 2025-03-28 PROCEDURE — 84144 ASSAY OF PROGESTERONE: CPT

## 2025-03-28 PROCEDURE — 85027 COMPLETE CBC AUTOMATED: CPT

## 2025-03-28 PROCEDURE — 82166 ASSAY ANTI-MULLERIAN HORM: CPT

## 2025-03-29 ENCOUNTER — RESULTS FOLLOW-UP (OUTPATIENT)
Dept: OTHER | Facility: HOSPITAL | Age: 30
End: 2025-03-29

## 2025-04-05 LAB — MIS SERPL-MCNC: 2.53 NG/ML

## 2025-04-07 ENCOUNTER — TELEPHONE (OUTPATIENT)
Dept: OBGYN CLINIC | Facility: CLINIC | Age: 30
End: 2025-04-07

## 2025-04-07 NOTE — TELEPHONE ENCOUNTER
Patient has been placed on synthroid due to her thyroid levels, 50 mcg of synthriod. Pt is questioning her AMH levels- she is not sure what it means or if she should be concerned about it. Since it has decreased since she had it drawn previously.

## 2025-05-09 ENCOUNTER — TELEMEDICINE (OUTPATIENT)
Dept: PERINATAL CARE | Facility: CLINIC | Age: 30
End: 2025-05-09

## 2025-05-09 DIAGNOSIS — Z31.69 ENCOUNTER FOR PRECONCEPTION CONSULTATION: Primary | ICD-10-CM

## 2025-05-09 PROBLEM — Z34.00 SUPERVISION OF NORMAL FIRST PREGNANCY, ANTEPARTUM: Status: RESOLVED | Noted: 2023-04-07 | Resolved: 2025-05-09

## 2025-05-09 PROCEDURE — NC001 PR NO CHARGE: Performed by: OBSTETRICS & GYNECOLOGY

## 2025-05-09 NOTE — ASSESSMENT & PLAN NOTE
Had daughter Stephanie 11/15/2023 7 lb 3.5 oz at 39w1d.  On prenatal vitamins.  Is on synthroid 50mcg for likely subclinical hypothyroidism.  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  Had loss in February; had D&E 2/5/25, still has not ovulated but has had several shorter cycles.  Seeing Dr. Budinetz 5/19/25.  May be offered IVF/PGD given CARLOS ALBERTO mutation as above however monitoring per her is sufficient.  Is exercising; running regularly, taking better care of herself.  Nothing to add today as she has great team of providers and is doing appropriate workup.  All questions answered; she can reach out PRN.       Neurosurgery  Established Patient    SUBJECTIVE:     History of Present Illness:  Ms. Betts, is a 50 year old female, with multiple complaints of headache, dizziness, bilateral arm and bilateral leg paresthesias, in a non dermatomal fashion.  She has a past medical history of interstitial cystitis, gastroparesis, status post gastric pacemaker in place, GERD, GI bleed x2 and cannot take NSAIDs, bilateral cubital tunnel release and a previous back injury in her 20s.  Upon workup for headaches and dizziness she underwent a CTA of the head which showed some anatomical variance with some concern for a partially empty sella.  She notes that her headaches were previously 2 to 3 times a month are now daily.  They are 6-7/10 out of severity want to the time.  She denies any visual obscurations, she denies any tinnitus, she denies any dermatomal quality to her paresthesias in the upper lower extremities.  She does note some neck pain.  She is unable to get a MRI secondary to her gastric pacemaker.  She most recently underwent an EMG of the bilateral lower extremities and has 1 plan for the bilateral upper extremities.  She is seeing Dr. Dan for her neck and back pain, and she has a CT myelogram planned.  She had also been evaluated by an ophthalmologist, outside the [facility] system, with no evidence, per report, of any papilledema, or any visual field abnormalities.  She does have a 37 year pack history of smoking.  She does not have any history of diabetes or IV drug use.  She is not on any anticoagulation or take chronic narcotics.  She is accompanied by her spouse.       Interval history:  We would recommended patient undergo a CT venogram, to assess the overall patency of the venous system given that she was not unable to undergo an MRV secondary to her gastric pacemaker she presents now after undergoing a CTA, no CTV was done.  She did however have lumbar puncture with associated myelogram.    Review of patient's allergies  "indicates:   Allergen Reactions    Ketorolac Anaphylaxis and Shortness Of Breath     Other reaction(s): Throat swelling, Unknown    Morphine Anaphylaxis    Tramadol Shortness Of Breath     Other reaction(s): Throat swelling, Unknown    Codeine Itching    Gabapentin Other (See Comments)     Causes suicidal thoughts        Ibuprofen Other (See Comments)     G L Bleeding        Nsaids (non-steroidal anti-inflammatory drug) Other (See Comments)     GI BLEEDING        Hydrocodone      Other reaction(s): Unknown    Hydrocodone-acetaminophen     Metoclopramide Other (See Comments)     Headaches and insomnia          Reglan [metoclopramide hcl] Other (See Comments)     Headaches and insomnia      Topiramate Other (See Comments)     Ulcers in the mouth and dry mouth  per pt, gets "fog brain"            Corticosteroids (glucocorticoids) Nausea And Vomiting and Other (See Comments)     Acid reflux  Acid reflux  Increases acid reflux    Prednisone Other (See Comments)     Increases acid reflux       Current Outpatient Medications   Medication Sig Dispense Refill    (Magic mouthwash) 1:1:1 diphenhydramine(Benadryl) 12.5mg/5ml liq, aluminum & magnesium hydroxide-simethicone (Maalox), LIDOcaine viscous 2% Swish and spit 5 mLs every 4 (four) hours as needed (mouth sores). for mouth sores 240 mL 3    acetaminophen (TYLENOL) 500 MG tablet Take 1 tablet (500 mg total) by mouth every 6 (six) hours as needed for Pain. 28 tablet 0    ALPRAZolam (XANAX) 1 MG tablet Take 1 mg by mouth 2 (two) times daily as needed.       busPIRone (BUSPAR) 5 MG Tab Take 5 mg by mouth 2 (two) times daily.      CREON 24,000-76,000 -120,000 unit capsule TAKE 1 CAPSULE BY MOUTH 3  TIMES DAILY WITH MEALS 500 capsule 3    DEXILANT 60 mg capsule TAKE 1 CAPSULE BY MOUTH  ONCE DAILY 90 capsule 3    diphenhydrAMINE (BENADRYL) 50 MG tablet Take 50 mg by mouth every 4 (four) hours as needed for Itching.      estradioL (ESTRACE) 2 MG tablet Take 1.5 tablets (3 mg " total) by mouth once daily. 135 tablet 3    flavoxATE (URISPAS) 100 mg Tab 1 tablet      fluticasone propionate (FLONASE) 50 mcg/actuation nasal spray 1 spray by Each Nostril route once daily.      fremanezumab-vfrm (AJOVY AUTOINJECTOR) 225 mg/1.5 mL autoinjector Inject 1.5 mLs (225 mg total) into the skin every 28 days. 1 each 10    LIDOCAINE VISCOUS 2 % solution Take 5 mLs by mouth 4 (four) times daily as needed.      loperamide (IMODIUM) 2 mg capsule TAKE 2 CAPSULES BY MOUTH  TWICE DAILY AS NEEDED 360 capsule 1    meclizine (ANTIVERT) 25 mg tablet Take by mouth 3 (three) times daily as needed.      methocarbamol (ROBAXIN-750 ORAL) Take by mouth.      metoprolol succinate (TOPROL-XL) 25 MG 24 hr tablet       oxyCODONE-acetaminophen (PERCOCET) 5-325 mg per tablet Take 1 tablet by mouth every 8 (eight) hours as needed for Pain. 10 tablet 0    pantoprazole (PROTONIX) 40 mg injection Inject 40 mg into the vein 2 (two) times a day. 60 each 11    promethazine (PHENERGAN) 25 mg/mL injection Inject 25 mg into the vein every 4 (four) hours.      simvastatin (ZOCOR) 40 MG tablet Take 40 mg by mouth every evening.       SODIUM CHLORIDE 0.45 % IV Inject 1,000 mLs into the vein as needed.       sodium chloride 0.9% SolP 100 mL with pantoprazole 40 mg SolR 40 mg Inject 40 mg into the vein every 12 (twelve) hours. 60 vial 0    sucralfate (CARAFATE) 1 gram tablet TAKE 1 TABLET BY MOUTH 4  TIMES DAILY BEFORE MEALS  AND AT NIGHT 360 tablet 3    tiZANidine (ZANAFLEX) 4 MG tablet Take 4 mg by mouth 3 (three) times daily.      triamcinolone acetonide 0.1% (KENALOG) 0.1 % ointment AAA hands bid - may occlude qhs 454 g 3    simethicone (MYLICON) 125 mg Cap capsule Take 250 mg by mouth 2 (two) times a day.       No current facility-administered medications for this visit.     Facility-Administered Medications Ordered in Other Visits   Medication Dose Route Frequency Provider Last Rate Last Admin    LIDOcaine (PF) 10 mg/ml (1%) injection  10 mg  1 mL Intradermal Once Wes Stahl MD        midazolam (VERSED) 1 mg/mL injection 0.5-4 mg  0.5-4 mg Intravenous PRN Wes Stahl MD   2 mg at 10/07/22 0754       Past Medical History:   Diagnosis Date    Abnormal Pap smear of vagina     Anxiety     Cataract     Chronic pain     TMJ and abdomen    Colon polyp     Epilepsy     as a child    Essential (primary) hypertension     Gastroparesis     Idiopathic    GERD (gastroesophageal reflux disease)     H/O abnormal cervical Papanicolaou smear     Hypercholesteremia 06/21/2016    Hyperlipidemia     IC (interstitial cystitis)     Internal hemorrhoids     Interstitial cystitis     Irritable bowel syndrome (IBS)     Irritable bowel syndrome with diarrhea 06/21/2016    Rapid heart rate     Spastic colon     TMJ (temporomandibular joint disorder)     TMJ (temporomandibular joint syndrome) 06/21/2016     Past Surgical History:   Procedure Laterality Date    APPENDECTOMY      BREAST BIOPSY Bilateral     CATARACT EXTRACTION      CHOLECYSTECTOMY      COLONOSCOPY      COLONOSCOPY N/A 7/31/2018    Procedure: COLONOSCOPY;  Surgeon: Zack Lehman MD;  Location: [address]);  Service: Endoscopy;  Laterality: N/A;    CYSTOSCOPY N/A 8/25/2020    Procedure: CYSTOSCOPY;  Surgeon: Christal Khan MD;  [address];  Service: Urology;  Laterality: N/A;    ELBOW SURGERY      ENDOSCOPIC CARPAL TUNNEL RELEASE Right 10/7/2022    Procedure: RELEASE, CARPAL TUNNEL, ENDOSCOPIC;  Surgeon: William Laboy MD;  Location: [facility];  Service: Orthopedics;  Laterality: Right;    ESOPHAGOGASTRODUODENOSCOPY N/A 7/31/2018    Procedure: EGD (ESOPHAGOGASTRODUODENOSCOPY);  Surgeon: Zack Lehman MD;  Location: [address]);  Service: Endoscopy;  Laterality: N/A;  RUQ gastric pacemaker    Left upper arm PICC line    PONV    ESOPHAGOGASTRODUODENOSCOPY N/A 9/3/2019    Procedure: EGD (ESOPHAGOGASTRODUODENOSCOPY);  Surgeon: Zack Lehman MD;  Location: [facility]  [facility] (4TH FLR);  Service: Endoscopy;  Laterality: N/A;  history of gastoparesis, per change in protocol, pt instructed to do full liquid diet x 3 days prior to procedure and clear liquids x 1 day prior to procedure-BB  pt has gastric pacemaker, monitored by Dr. Lehman-YASMANY  hx of epilepsy, last seizure during chi    ESOPHAGOGASTRODUODENOSCOPY N/A 9/10/2020    Procedure: ESOPHAGOGASTRODUODENOSCOPY (EGD);  Surgeon: Zack Lehman MD;  Location: [facility] (4TH FLR);  Service: Endoscopy;  Laterality: N/A;  3 days Full liquid diet/ 1 day Clear liquids  waiting for Pt to cb with date - ERW  Left upper arm -  PICC  COVID screening 9/7/20 [facility]- [facility]    ESOPHAGOGASTRODUODENOSCOPY N/A 11/3/2021    Procedure: EGD (ESOPHAGOGASTRODUODENOSCOPY);  Surgeon: Zack Lehman MD;  Location: [facility] (4TH FLR);  Service: Endoscopy;  Laterality: N/A;  10/15 fully vaccinated as of 7/15/21; gastric pacemaker-pt does not have remote;  pt has a port does not want IV started; 3 days full liquid diet; instr. to portal-st    GASTRIC STIMULATOR IMPLANT SURGERY      left side on 03/30/2020    gastric stimulator placement      LASIK Bilateral 2006    MANDIBLE SURGERY Bilateral 10/17/2016    MYELOGRAPHY N/A 8/22/2022    Procedure: Myelogram lumbar;  Surgeon: Malia Surgeon;  Location: [facility];  Service: Anesthesiology;  Laterality: N/A;    OOPHORECTOMY Bilateral     PYLOROPLASTY  03/2016    STOMACH SURGERY      gastric pacemaker    TEMPOROMANDIBULAR JOINT SURGERY  12/2016    TONSILLECTOMY      TOTAL ABDOMINAL HYSTERECTOMY  2005    EUGENIA/BSO, Trachelectomy  2012    UPPER GASTROINTESTINAL ENDOSCOPY      WRIST SURGERY       Family History       Problem Relation (Age of Onset)    Cancer Paternal Aunt    Coronary artery disease Mother, Father (43), Paternal Grandfather    Glaucoma Maternal Grandmother    Heart attacks under age 50 Father    Hodgkin's lymphoma Mother    Hypertension Mother, Father    Lymphoma Sister    Pancreatic cancer  "Paternal Aunt    Stroke Paternal Grandfather, Maternal Grandfather          Social History     Socioeconomic History    Marital status:    Tobacco Use    Smoking status: Former     Packs/day: 1.00     Years: 20.00     Pack years: 20.00     Types: Cigarettes     Start date: 1986     Quit date: 2022     Years since quittin.1    Smokeless tobacco: Never   Substance and Sexual Activity    Alcohol use: No     Alcohol/week: 0.0 standard drinks    Drug use: No    Sexual activity: Yes     Partners: Male     Comment:  since        Review of Systems    OBJECTIVE:     Vital Signs  Temp: 98.2 °F (36.8 °C)  Pulse: 73  BP: (!) 146/88  Pain Score:   8  Height: 5' 8" (172.7 cm)  Weight: 113.4 kg (250 lb)  Body mass index is 38.01 kg/m².    Neurosurgery Physical Exam  General: no acute distress  Head: Non-traumatic, normocephalic  Eyes: Pupils equal, EOMI  Neck: Supple, normal ROM, no tenderness to palpation  CVS: Normal rate and rhythm, distal pulses present  Pulm: Symmetric expansion, no respiratory distress  GI: Abdomen nondistended, nontender     MSK: Moves all extremities without restriction, atraumatic  Skin: Dry, intact  Psych: Normal thought content and cognition     Neuro:  Alert, awake, oriented, to self, place, time  Language:  Speech is fluent, goal directed without any noted dysarthria or aphasia     Cranial nerves:    CNII-XII: Intact on fine exam,   Pupils equal round react to light,   Extraocular muscles are intact  V1 to V3 is intact to light touch,   no facial asymmetry,   Hearing is intact to finger rub and voice  tongue/uvula/palate midline,   shoulder shrug equal,      No pronator drift     Extremities:  Motor:           Upper Extremity     Deltoid Triceps Biceps Wrist  Extension Wrist  Flexion Interosseous   R 5/5 5/5 5/5 5/5 5/5 5/5   L 5/5 5/5 5/5 5/5 5/5 5/5         Thumb   Abduction Thumb  ADDuction Finger  Flexion Finger  Extension       R 5/5 5/5 5/5 5/5       L 5/5 5/5 5/5 " 5/5           Lower Extremity      Iliopsoas Quadriceps Hamstring Plantarflexion Dorsiflexion EHL   R 5/5 5/5 5/5 5/5 5/5 5/5   L 5/5 5/5 5/5 5/5 5/5 5/5         Reflexes:     DTR:    2+ biceps                       2+ triceps              2+ brachioradialis              2+ patellar  2+ Achilles     Abobtt's: Negative     Clonus: Negative     Sensory:      Sensation intact to light touch, temperature sensation, vibration, pinprick     Coordination:      Coordination intact throughout, no dysmetria, normal rapid alternating movements, no dysdiadochokinesia  Cerebellar:  Normal finger-to-nose, normal heel-to-shin  Cervical spine:  No midline cervical tenderness, negative Lhermitte, negative Spurling  Thoracic spine:  No midline thoracic tenderness  Lumbar spine:  No midline lumbar tenderness         Diagnostic Results:  I personally reviewed patient's Diagnostic Imaging.  Again CTA of the head does not show any significant extracranial vertebral artery stenosis.  There is diminutive hypoplastic right P1.  The veins appeared to be patent, with some dominance of the right transverse/sigmoid sinus.  CT myelogram does not show any significant spinal canal stenosis in the cervical, thoracic, or lumbar spine.     ASSESSMENT/PLAN:     50-year-old female with multiple complaints, nondermatomal upper lower extremity paresthesias as well as headache and dizziness.  Concern for reading of partial empty sella.     1. No focal neurologic deficits on examination here in clinic  2. CTA of the head and neck did not show any extracranial carotid or vertebral artery stenosis.  There is a diminutive normal variant with a hypoplastic right P1 and a dominant/fetal PCA.  Normal-appearing bilateral SCA configuration.  CT myelogram does not show any significant cervical spinal, thoracic or lumbar stenosis.  There is no obvious venous sinus stenosis.  There is some venous dominance to the right there was no dedicated CT venogram to  visualize.  3. Had long discussion with patient.  I would recommend CTV, given patient is not able to undergo MRI secondary to her gastric pacemaker.  I would also recommend follow-up with Neurology for her diffuse symptomatology.  I discussed with the patient she is able to get an MRI of the brain, cervical spine, thoracic spine with and without contrast, if she is able to pause her gastric pacemaker that may be of added utility.  I answered all the patient's questions and to her satisfaction.     Thank you so very much for allowing me to participate in the care of this patient.  Please feel free to call any questions, comments, or concerns.     Dennis Valiente MD,MSc  Department of Neurosurgery   Department of Radiology  Department of Neurology  [facility]    [facility]   [employer] [facility] [employer] [facility]     Total time spent in counseling and discussion about further management options including relevant lab work, treatment,  prognosis, medications and intended side effects was more than 60 minutes. More than 50 % of the time was spent in counseling and coordination of care.  I spent a total of 79 minutes on the day of the visit.This includes face to face time and non-face to face time preparing to see the patient (eg, review of tests), Obtaining and/or reviewing separately obtained history, Documenting clinical information in the electronic or other health record, Independently interpreting resultsand communicating results to the patient/family/caregiver, or Care coordination             Note dictated with voice recognition software, please excuse any grammatical errors.

## 2025-05-09 NOTE — LETTER
May 9, 2025     Stephanie Hall MD  [address]  [address]  [place] [address]    Patient: Jj Meier   YOB: 1995   Date of Visit: 5/9/2025       Dear Dr. Stephanie Hall MD:    I saw Jj Meier for a brief followup. Below are my notes for this consultation.    If you have questions, please do not hesitate to call me.        Sincerely,        Zhanna Dela Cruz MD        CC: No Recipients    Zhanna Dela Cruz MD  5/9/2025 12:37 PM  Sign when Signing Visit  Virtual Regular Visit    Verification of patient location: Jj Meier is located in the following state in which I hold an active license [place].    The patient was identified by name and date of birth.   She was informed that this is a telemedicine visit and that the visit is being conducted through the Epic Embedded platform. She agrees to proceed.    My office door was closed.   No one else was in the room.    She acknowledged consent and understanding of privacy and security of the platform, agrees to participate, and understands they can discontinue the visit at any time.    Patient is aware this is a billable service.     Problem List Items Addressed This Visit          Other    Encounter for preconception consultation - Primary    Had daughter Stephanie 11/15/2023 7 lb 3.5 oz at 39w1d.  On prenatal vitamins.  Is on synthroid 50mcg for likely subclinical hypothyroidism.  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  Had loss in February; had D&E 2/5/25, still has not ovulated but has had several shorter cycles.  Seeing Dr. Budinetz 5/19/25.  May be offered IVF/PGD given CARLOS ALBERTO mutation as above however monitoring per her is sufficient.  Is exercising; running regularly, taking better care of herself.  Nothing to add today as she has great team of  providers and is doing appropriate workup.  All questions answered; she can reach out PRN.              The time spent on this patient on the encounter date included 3 minutes previsit service time reviewing records and precharting, 20 minutes face-to-face service time counseling regarding results and coordinating care, and  4 minutes charting, totalling 27 minutes.

## 2025-05-09 NOTE — PROGRESS NOTES
Virtual Regular Visit    Verification of patient location: Jj Meier is located in the following state in which I hold an active license [place].    The patient was identified by name and date of birth.   She was informed that this is a telemedicine visit and that the visit is being conducted through the Epic Embedded platform. She agrees to proceed.    My office door was closed.   No one else was in the room.    She acknowledged consent and understanding of privacy and security of the platform, agrees to participate, and understands they can discontinue the visit at any time.    Patient is aware this is a billable service.     Problem List Items Addressed This Visit          Other    Encounter for preconception consultation - Primary    Had daughter Stephanie 11/15/2023 7 lb 3.5 oz at 39w1d.  On prenatal vitamins.  Is on synthroid 50mcg for likely subclinical hypothyroidism.  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  Had loss in February; had D&E 2/5/25, still has not ovulated but has had several shorter cycles.  Seeing Dr. Budinetz 5/19/25.  May be offered IVF/PGD given CARLOS ALBERTO mutation as above however monitoring per her is sufficient.  Is exercising; running regularly, taking better care of herself.  Nothing to add today as she has great team of providers and is doing appropriate workup.  All questions answered; she can reach out PRN.              The time spent on this patient on the encounter date included 3 minutes previsit service time reviewing records and precharting, 20 minutes face-to-face service time counseling regarding results and coordinating care, and  4 minutes charting, totalling 27 minutes.

## 2025-05-21 ENCOUNTER — DOCUMENTATION (OUTPATIENT)
Dept: OBGYN CLINIC | Facility: CLINIC | Age: 30
End: 2025-05-21

## 2025-05-21 DIAGNOSIS — Z34.90 EARLY STAGE OF PREGNANCY: Primary | ICD-10-CM

## 2025-05-21 RX ORDER — PROGESTERONE 200 MG/1
200 CAPSULE ORAL
Qty: 30 CAPSULE | Refills: 3 | Status: SHIPPED | OUTPATIENT
Start: 2025-05-21

## 2025-05-23 ENCOUNTER — TELEPHONE (OUTPATIENT)
Dept: OBGYN CLINIC | Facility: CLINIC | Age: 30
End: 2025-05-23

## 2025-05-23 DIAGNOSIS — Z32.01 POSITIVE PREGNANCY TEST: Primary | ICD-10-CM

## 2025-05-27 ENCOUNTER — APPOINTMENT (OUTPATIENT)
Dept: LAB | Facility: HOSPITAL | Age: 30
End: 2025-05-27
Payer: COMMERCIAL

## 2025-05-27 ENCOUNTER — RESULTS FOLLOW-UP (OUTPATIENT)
Dept: LABOR AND DELIVERY | Facility: HOSPITAL | Age: 30
End: 2025-05-27

## 2025-05-27 DIAGNOSIS — Z32.01 POSITIVE PREGNANCY TEST: ICD-10-CM

## 2025-05-27 DIAGNOSIS — Z34.90 EARLY STAGE OF PREGNANCY: Primary | ICD-10-CM

## 2025-05-27 LAB
B-HCG SERPL-ACNC: 2302.5 MIU/ML (ref 0–5)
PROGEST SERPL-MCNC: 59.97 NG/ML

## 2025-05-27 PROCEDURE — 36415 COLL VENOUS BLD VENIPUNCTURE: CPT

## 2025-05-27 PROCEDURE — 84144 ASSAY OF PROGESTERONE: CPT

## 2025-05-27 PROCEDURE — 84702 CHORIONIC GONADOTROPIN TEST: CPT

## 2025-05-30 ENCOUNTER — RESULTS FOLLOW-UP (OUTPATIENT)
Dept: LABOR AND DELIVERY | Facility: HOSPITAL | Age: 30
End: 2025-05-30

## 2025-05-31 ENCOUNTER — OFFICE VISIT (OUTPATIENT)
Dept: LAB | Facility: HOSPITAL | Age: 30
End: 2025-05-31
Payer: COMMERCIAL

## 2025-05-31 DIAGNOSIS — Z34.90 EARLY STAGE OF PREGNANCY: ICD-10-CM

## 2025-05-31 LAB — B-HCG SERPL-ACNC: 6512.5 MIU/ML (ref 0–5)

## 2025-05-31 PROCEDURE — 84702 CHORIONIC GONADOTROPIN TEST: CPT

## 2025-05-31 PROCEDURE — 36415 COLL VENOUS BLD VENIPUNCTURE: CPT

## 2025-06-02 ENCOUNTER — RESULTS FOLLOW-UP (OUTPATIENT)
Dept: OBGYN CLINIC | Facility: CLINIC | Age: 30
End: 2025-06-02

## 2025-06-02 DIAGNOSIS — Z34.90 EARLY STAGE OF PREGNANCY: Primary | ICD-10-CM

## 2025-06-02 NOTE — TELEPHONE ENCOUNTER
Patient aware of hCG results increasing about every 2.9 days.  She can have an ultrasound at 6 to 7 weeks for dating and viability

## 2025-06-11 RX ORDER — LEVOTHYROXINE SODIUM 50 UG/1
TABLET ORAL
COMMUNITY
Start: 2025-04-21

## 2025-06-12 ENCOUNTER — ULTRASOUND (OUTPATIENT)
Dept: OBGYN CLINIC | Facility: CLINIC | Age: 30
End: 2025-06-12
Payer: COMMERCIAL

## 2025-06-12 VITALS
SYSTOLIC BLOOD PRESSURE: 118 MMHG | HEIGHT: 67 IN | DIASTOLIC BLOOD PRESSURE: 68 MMHG | BODY MASS INDEX: 25.15 KG/M2 | WEIGHT: 160.2 LBS

## 2025-06-12 DIAGNOSIS — Z36.89 CONFIRM FETAL CARDIAC ACTIVITY USING ULTRASOUND: ICD-10-CM

## 2025-06-12 DIAGNOSIS — Z32.00 ENCOUNTER FOR CONFIRMATION OF PREGNANCY TEST RESULT WITH PHYSICAL EXAMINATION: ICD-10-CM

## 2025-06-12 DIAGNOSIS — Z3A.01 7 WEEKS GESTATION OF PREGNANCY: Primary | ICD-10-CM

## 2025-06-12 PROCEDURE — 99214 OFFICE O/P EST MOD 30 MIN: CPT | Performed by: OBSTETRICS & GYNECOLOGY

## 2025-06-12 PROCEDURE — 76817 TRANSVAGINAL US OBSTETRIC: CPT | Performed by: OBSTETRICS & GYNECOLOGY

## 2025-06-12 NOTE — PROGRESS NOTES
Assessment/Plan     Diagnoses and all orders for this visit:    7 weeks gestation of pregnancy  -     Ambulatory Referral to Maternal Fetal Medicine; Future  -     AMB US OB < 14 weeks single or first gestation level 1    Confirm fetal cardiac activity using ultrasound  -     Ambulatory Referral to Maternal Fetal Medicine; Future  -     AMB US OB < 14 weeks single or first gestation level 1    Encounter for confirmation of pregnancy test result with physical examination  -     AMB US OB < 14 weeks single or first gestation level 1    Other orders  -     CHOLINE  -     levothyroxine (Synthroid) 50 mcg tablet        Previous history of miscarriage.  She is on progesterone supplementation.  This was also recommended by MAGALIS.  She will continue this until 12 weeks.    To return in 2 weeks for repeat ultrasound for dating and viability due to early gestational age on ultrasound today and previous history.  Referred to [facility] for 12-week ultrasound for nuchal translucency as well as genetic testing is desired as well as level 2 ultrasound.  Advised to call if with any pregnancy problems or concerns.    Subjective   Jj Meier is an 29 y.o. woman who presents for pregnancy confirmation.    Chief Complaint   Patient presents with    Pregnancy Ultrasound       Patient is here for a pregnancy confirmation.  Patient's last menstrual period was 2025.  7   Estimated Date of Delivery: None noted. 26        OB History    Para Term  AB Living   3 1 1 0 1 1   SAB IAB Ectopic Multiple Live Births   1 0 0 0 1      # Outcome Date GA Lbr Ubaldo/2nd Weight Sex Type Anes PTL Lv   3 Current            2 SAB 25 6w0d          1 Term 11/15/23 39w1d / 00:30 3275 g (7 lb 3.5 oz) F Vag-Spont EPI N TAMIKA      Name: ADDIS,BABY GIRL (JJ)      Apgar1: 6  Apgar5: 9       Past Medical History[1]    Past Surgical History[2]    Social History[3]    Allergies   Allergen Reactions    Levofloxacin Hives  "      Current Medications[4]    The following portions of the patient's history were reviewed and updated as appropriate: allergies, current medications, past family history, past medical history, past social history, past surgical history, and problem list.      Review of Systems   Constitutional: Negative.    HENT: Negative.     Eyes: Negative.    Respiratory: Negative.     Cardiovascular: Negative.    Gastrointestinal: Negative.    Endocrine: Negative.    Genitourinary:         As noted in HPI   Musculoskeletal: Negative.    Skin: Negative.    Allergic/Immunologic: Negative.    Neurological: Negative.    Hematological: Negative.    Psychiatric/Behavioral: Negative.         /68   Ht 5' 7\" (1.702 m)   Wt 72.7 kg (160 lb 3.2 oz)   LMP 04/22/2025   BMI 25.09 kg/m²     Physical Exam  Constitutional:       General: She is not in acute distress.     Appearance: Normal appearance. She is well-developed.   Abdominal:      Palpations: Abdomen is soft.      Tenderness: There is no abdominal tenderness. There is no guarding.     Neurological:      Mental Status: She is alert and oriented to person, place, and time.     Skin:     General: Skin is warm and dry.     Psychiatric:         Behavior: Behavior normal.           FIRST TRIMESTER OBSTETRIC ULTRASOUND  06/12/25    Stephanie Hall MD       INDICATION: Amenorrhea, viability    COMPARISON: None.     TECHNIQUE:   Transvaginal imaging was performed to assess the gestation, myometrial/endometrial architecture and ovarian parenchymal detail.    The study includes volumetric sweeps and traditional still imaging technique.      FINDINGS:     A single intrauterine gestation is identified.  Cardiac activity is detected at 137 bpm.      YOLK SAC:  Present and normal in size and appearance.  MEAN CROWN RUMP LENGTH:  9.9 mm = 7 weeks 0 days   AMNIOTIC FLUID/SAC SHAPE:  Within expected normal range.     UTERUS/ADNEXA:   No adnexal mass or pathologic cyst.  No free fluid " identified.       Small subchorionic hemorrhage measuring 0.59 cm  x 0.45 cm  x 0.37 cm    IMPRESSION:     Single intrauterine pregnancy of 7 weeks 0 days gestational age.  EDITA by US 26  Fetal cardiac activity detected.  No adnexal masses seen.  The gestational age by LMP is </= 8w 6d and demonstrates 5 or fewer days difference from the gestational age by CRL, therefore the final EDITA will be based on the LMP      Final EDITA: 26 by LMP.        Ultrasound Probe Disinfection    A transvaginal ultrasound was performed.   Prior to use, disinfection was performed with High Level Disinfection Process ([employer]).  Probe serial number F: [id number] was used.      Stephanie Hall MD  25  9:10 AM      Future Appointments   Date Time Provider Department Center   2025 11:45 AM Nova Jane MD OBGYN [facility]   2025  3:00 PM OBGYN OB INTAKE [facility]OM[facility] Complete  Practice-Wom   7/10/2025  9:15 AM Stephanie Hall MD COMP  [facility] Practice-Wom   2026  2:30 PM MANUELA Kerr SURG ONC ALL Practice-Onc   3/16/2026  9:30 AM Stephanie Hall MD Complete  Practice-Wom            [1]   Past Medical History:  Diagnosis Date    Anemia     iron deficiency    Anxiety     grief reaction    Asthma     exercise induced, inhalers not used, has effectively resolved with more exercise    GERD (gastroesophageal reflux disease)     Liver mass     focal nodular hyperplasia; 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   [2]   Past Surgical History:  Procedure Laterality Date    KNEE SURGERY Left     excision of tumor-benign    LASIK      IL TX MISSED  FIRST TRIMESTER SURGICAL N/A 2025    Procedure: (D&E) ( 6 WEEKS), EUA;  Surgeon: Stephanie Hall MD;  Location: [facility];  Service: Gynecology    TONSILLECTOMY  age 17    WISDOM TOOTH EXTRACTION     [3]   Social History  Tobacco Use    Smoking status: Never     Smokeless tobacco: Never   Vaping Use    Vaping status: Never Used   Substance Use Topics    Alcohol use: Yes     Comment: Social    Drug use: No   [4]   Current Outpatient Medications:     cholecalciferol (VITAMIN D3) 25 mcg (1,000 units) tablet, , Disp: , Rfl:     CHOLINE, , Disp: , Rfl:     levothyroxine (Synthroid) 50 mcg tablet, , Disp: , Rfl:     Prenatal Vit-Fe Fumarate-FA (PRENATAL PO), Take by mouth, Disp: , Rfl:     Progesterone (Prometrium) 200 MG CAPS, Take 200 mg by mouth daily at bedtime, Disp: 30 capsule, Rfl: 3    EPINEPHrine (EPIPEN) 0.3 mg/0.3 mL SOAJ, Inject 0.3 mL (0.3 mg total) into a muscle once for 1 dose, Disp: 0.6 mL, Rfl: 2

## 2025-06-27 ENCOUNTER — ULTRASOUND (OUTPATIENT)
Age: 30
End: 2025-06-27
Payer: COMMERCIAL

## 2025-06-27 VITALS
DIASTOLIC BLOOD PRESSURE: 76 MMHG | HEIGHT: 67 IN | WEIGHT: 161.4 LBS | BODY MASS INDEX: 25.33 KG/M2 | SYSTOLIC BLOOD PRESSURE: 114 MMHG

## 2025-06-27 DIAGNOSIS — Z36.89 CONFIRM FETAL CARDIAC ACTIVITY USING ULTRASOUND: Primary | ICD-10-CM

## 2025-06-27 PROBLEM — Z31.69 ENCOUNTER FOR PRECONCEPTION CONSULTATION: Status: RESOLVED | Noted: 2025-05-09 | Resolved: 2025-06-27

## 2025-06-27 PROBLEM — O02.1 MISSED ABORTION: Status: RESOLVED | Noted: 2025-02-05 | Resolved: 2025-06-27

## 2025-06-27 PROCEDURE — 99213 OFFICE O/P EST LOW 20 MIN: CPT | Performed by: OBSTETRICS & GYNECOLOGY

## 2025-06-27 PROCEDURE — 76817 TRANSVAGINAL US OBSTETRIC: CPT | Performed by: OBSTETRICS & GYNECOLOGY

## 2025-06-27 NOTE — ASSESSMENT & PLAN NOTE
Viable pregnancy was noted on ultrasound today.  EDITA was finalized.  Patient referred to maternal-fetal medicine for first trimester ultrasound, genetic screening if desired level 2 ultrasound.  Discussed with patient pregnancy warning signs and to call if with pregnancy problems or concerns.  She was advised to continue to take prenatal vitamins.  Follow-up in 2 weeks for OB intake.

## 2025-06-27 NOTE — PROGRESS NOTES
"    Subjective   Patient ID: Jj Meier is a 30 y.o. female.    Patient is here for a problem visit.    Chief Complaint   Patient presents with    Pregnancy Ultrasound     9w3d     Had US 2 weeks ago   H/o SAB, on progesterone, per MAGALIS to continue until 12 weeks   SLIUP c/w 7 week US  MFM NT appt is scheduled   No cramping or VB  Nausea is mild, no vomiting     Menstrual History:  OB History          3    Para   1    Term   1       0    AB   1    Living   1         SAB   1    IAB   0    Ectopic   0    Multiple   0    Live Births   1                  Patient's last menstrual period was 2025.         Past Medical History[1]    Past Surgical History[2]    Social History[3]     Allergies   Allergen Reactions    Levofloxacin Hives       Current Medications[4]      Review of Systems   Constitutional:  Negative for appetite change, chills and fever.   Eyes:  Negative for visual disturbance.   Respiratory:  Negative for cough, chest tightness and shortness of breath.    Cardiovascular:  Negative for chest pain.   Gastrointestinal:  Negative for abdominal distention, abdominal pain, constipation, diarrhea, nausea and vomiting.   Endocrine: Negative for cold intolerance and heat intolerance.   Genitourinary:  Negative for difficulty urinating, dyspareunia, dysuria, frequency, genital sores, pelvic pain, urgency, vaginal bleeding, vaginal discharge and vaginal pain.   Musculoskeletal:  Negative for arthralgias.   Neurological:  Negative for light-headedness and headaches.   Hematological:  Does not bruise/bleed easily.   Psychiatric/Behavioral:  Negative for behavioral problems.    All other systems reviewed and are negative.        /76 (BP Location: Right arm, Patient Position: Sitting, Cuff Size: Standard)   Ht 5' 7\" (1.702 m)   Wt 73.2 kg (161 lb 6.4 oz)   LMP 2025   BMI 25.28 kg/m²       Physical Exam  Constitutional:       General: She is not in acute distress.     " Appearance: Normal appearance.   Genitourinary:      Right Labia: No rash, tenderness, lesions or skin changes.     Left Labia: No tenderness, lesions, skin changes or rash.     Pelvic exam was performed with patient in the lithotomy position.   HENT:      Head: Normocephalic and atraumatic.     Cardiovascular:      Rate and Rhythm: Normal rate.   Pulmonary:      Effort: Pulmonary effort is normal. No respiratory distress.   Abdominal:      General: There is no distension.      Palpations: Abdomen is soft.      Tenderness: There is no abdominal tenderness. There is no guarding or rebound.     Neurological:      General: No focal deficit present.      Mental Status: She is alert.     Psychiatric:         Mood and Affect: Mood normal.         Behavior: Behavior normal.   Vitals and nursing note reviewed.           AMB US OB < 14 weeks single or first gestation level 1  Narrative: FIRST TRIMESTER OBSTETRIC ULTRASOUND  06/27/25    Nova Jane MD       INDICATION: Amenorrhea, viability    COMPARISON: None.     TECHNIQUE:   Transvaginal imaging was performed to assess the gestation,   myometrial/endometrial architecture and ovarian parenchymal detail.    The study includes volumetric sweeps and traditional still imaging   technique.      FINDINGS:     A single intrauterine gestation is identified.  Cardiac activity is detected at 174 bpm.      YOLK SAC:  Present and normal in size and appearance.  MEAN CROWN RUMP LENGTH:  24.2 mm = 9 weeks 0 days   AMNIOTIC FLUID/SAC SHAPE:  Within expected normal range.     UTERUS/ADNEXA:   No adnexal mass or pathologic cyst.  No free fluid identified.     Impression: Single intrauterine pregnancy of 9 weeks 0 days gestational age.  EDITA by US 1/30/26  Fetal cardiac activity detected.  No adnexal masses seen.     Additional Findings: none     Assigning a Final EDITA  Please choose how you are assigning the EDITA: The gestational age by LMP is   </= 8w 6d and demonstrates 5 or fewer  days difference from the gestational   age by CRL, therefore the final EDITA will be based on the LMP    Final EDITA: 1/27/26 by LMP.    Ultrasound Probe Disinfection    A transvaginal ultrasound was performed.   Prior to use, disinfection was performed with High Level Disinfection   Process ([employer]).  Probe serial number F: [id number] was used.    Nova Jane M.D.  OB/GYN COMPLETE WOMENS [facility] [address]   [facility] [address]  Dept: [phone]  Dept Fax: [phone]  Loc Appt: [phone]  Loc: [phone]  Loc Fax: [phone]      (Images unable to be uploaded to media today due to technical issues)     Appropriate laboratory testing, imaging studies, and prior external records were reviewed:     Assessment/Plan:       Problem List Items Addressed This Visit       Confirm fetal cardiac activity using ultrasound - Primary    Viable pregnancy was noted on ultrasound today.  EDITA was finalized.  Patient referred to maternal-fetal medicine for first trimester ultrasound, genetic screening if desired level 2 ultrasound.  Discussed with patient pregnancy warning signs and to call if with pregnancy problems or concerns.  She was advised to continue to take prenatal vitamins.  Follow-up in 2 weeks for OB intake.           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   [2]   Past Surgical History:  Procedure Laterality Date    KNEE SURGERY Left     excision of tumor-benign    LASIK      MS TX MISSED   FIRST TRIMESTER SURGICAL N/A 2025    Procedure: (D&E) ( 6 WEEKS), EUA;  Surgeon: Stephanie Hall MD;  Location: [facility];  Service: Gynecology    TONSILLECTOMY  age 17    WISDOM TOOTH EXTRACTION     [3]   Social History  Tobacco Use    Smoking status: Never    Smokeless tobacco: Never   Vaping Use    Vaping status: Never Used   Substance Use Topics    Alcohol use: Yes     Comment: Social    Drug use: No   [4]   Current Outpatient Medications:     cholecalciferol (VITAMIN D3) 25 mcg (1,000 units) tablet, , Disp: , Rfl:     CHOLINE, , Disp: , Rfl:     EPINEPHrine (EPIPEN) 0.3 mg/0.3 mL SOAJ, Inject 0.3 mL (0.3 mg total) into a muscle once for 1 dose, Disp: 0.6 mL, Rfl: 2    levothyroxine (Synthroid) 50 mcg tablet, , Disp: , Rfl:     Prenatal Vit-Fe Fumarate-FA (PRENATAL PO), Take by mouth, Disp: , Rfl:     Progesterone (Prometrium) 200 MG CAPS, Take 200 mg by mouth daily at bedtime, Disp: 30 capsule, Rfl: 3

## 2025-06-30 ENCOUNTER — INITIAL PRENATAL (OUTPATIENT)
Dept: OBGYN CLINIC | Facility: CLINIC | Age: 30
End: 2025-06-30

## 2025-06-30 VITALS — HEIGHT: 67 IN | BODY MASS INDEX: 25.28 KG/M2

## 2025-06-30 DIAGNOSIS — Z3A.09 9 WEEKS GESTATION OF PREGNANCY: Primary | ICD-10-CM

## 2025-06-30 DIAGNOSIS — Z34.81 PRENATAL CARE, SUBSEQUENT PREGNANCY, FIRST TRIMESTER: ICD-10-CM

## 2025-06-30 PROCEDURE — OBC

## 2025-06-30 NOTE — PATIENT INSTRUCTIONS
Congratulations!! Please review our Pregnancy Essential Guide and [facility] L&D Virtual tour from our networks website.     [facility] Pregnancy Essentials Guide  [facility] ([email or web address])       [email or web address]         Madeline BOSE RN  OB Nurse Navigator

## 2025-06-30 NOTE — PROGRESS NOTES
OB INTAKE INTERVIEW 2025    The patient was identified by name and date of birth and was informed that this is a virtual visit being conducted through a secure, HIPAA-complaint platform. I am in a private office space with the door closed. Patient acknowledged understanding of privacy.  She agrees to proceed and is aware that she may discontinue the visit at any time.      Patient is 30 y.o. who presents for OB intake at 9w6d.  She is not accompanied by anyone during this encounter.  The father of her baby (Ramone) is involved in the pregnancy.      Patient's last menstrual period was 2025.  Ultrasound: Measured 7 weeks 0 days on 2025  Estimated Date of Delivery: 26 confirmed by dating ultrasound.    Signs/Symptoms of Pregnancy:  Current pregnancy symptoms: fatigue ,nausea   Constipation no  Headaches no  Cramping/spotting no  PICA cravings no    Diabetes:  Body mass index is 25.28 kg/m².  If patient has 1 or more, please order early 1 hour GTT  History of GDM no  BMI >30 no  BMI >35 no  History of PCOS or current metformin use no  History of LGA/macrosomic infant (4000g/9lbs) no    If patient has 2 or more, please order early 1 hour GTT  BMI>30 no  AMA no  First degree relative with type 2 diabetes no  History of chronic HTN, hyperlipidemia, elevated A1C no  High risk race (, , ,  or ) no    Hypertension: if you answer yes to any of the following, please order baseline preeclampsia labs (cbc, comprehensive metabolic panel, urine protein creatinine ratio, uric acid)  History of of chronic HTN no  History of gestational HTN no  History of preeclampsia, eclampsia, or HELLP syndrome no  History of diabetes no  History of lupus, autoimmune disease, kidney disease no    Thyroid: if yes order TSH with reflex T4  History of thyroid disease YES    Bleeding Disorder or Hx of DVT - patient or first degree relative with history of. Order the  following if not done previously.   (Factor V, antithrombin III, prothrombin gene mutation, protein C and S Ag, lupus anticoagulant, anticardiolipin, beta-2 glycoprotein)   YES- father- completed panel    OB/GYN-  History of abnormal pap smear no       Date of last pap smear 3/14/2025  History of HPV no  History of Herpes/HSV no  History of other STI (gonorrhea, chlamydia, trich) no  History of prior  YES  History of prior  no  History of  delivery prior to 36 weeks 6 days no  History of Varicella or Vaccination hx   History of blood transfusion no  Ok for blood transfusion YES     Substance screening:   History of tobacco use no  Currently using tobacco no  Currently using alcohol no  Presently using drugs no  Past drug use  no  IV drug use- no  Partner drug use no  Parent/Family drug use no  Substance Use Screen Level N/A    MRSA Screening:   Does the pt have a hx of MRSA? no    Mental Health:  Hx of/or current dx of depression: no, Anxiety: YES,  Medications: no   EPDS Screen:  Negative / score 0    Dental Health:  Patient has seen a dentist in the past 6 months YES    Immunizations:  Influenza vaccine given this season YES   Discussed Tdap vaccine YES   Discussed COVID Vaccine YES     Genetic/MFM:  Do you or your partner have a history of any of the following in yourselves or first degree relatives?  Cystic fibrosis no  Spinal muscular atrophy no  Hemoglobinopathy/Sickle Cell/Thalassemia no  Fragile X Intellectual Disability no    If yes, discuss Carrier Screening and recommend consultation with MFM/Genetic Counseling and place specific MFM Referral for.    Discussed Carrier Screening being completed once in a lifetime as a standard of care lab test. Place orders for Cystic Fibrosis Gene Test (ZIA863) and Spinal Muscular Atrophy DNA ([id number]). Cost will be based on your medical policy, deductible, and co-insurance. Billing is done directly with [employer] and your insurance.  If you have questions  "please reach out to [facility] directly [phone]. They may ask you for CPT codes,  CF is 23909 and SMA is 29092.\" Patient completed in previous pregnancy desire testing for Cystic Fibrosis and Spinal Muscular Atrophy.    ACOG Patient Education Cystic Fibrosis and Spinal Muscular Atrophy prenatal screening given.    Appointment for Nuchal Translucency Ultrasound at [facility] is scheduled for 7/21.    Interview education:  [facility] Pregnancy Essentials Book reviewed, discussed and attached to their AVS YES     Nurse/Family Partnership-patient may qualify NO; referral placed NO     Prenatal lab work scripts YES    Extra labs ordered: TSH and T4 free    Aspirin/Preeclampsia Screen      Risk Level Risk Factor Recommendation   LOW Prior Uncomplicated full-term delivery YES No Aspirin recommendation        MODERATE Nulliparity no Recommend low-dose aspirin if     BMI>30 no 2 or more moderate risk factors    Family History Preeclampsia (mother/sister) no     35yr old or greater no     Black Race, Concern for SDOH/Low Socioeconomic no     IVF Pregnancy  no     Personal History Risks (low birth weight, prior adverse preg outcome, >10yr preg interval) no         HIGH History of Preeclampsia no Recommend low-dose aspirin if     Multifetal gestation no 1 or more high risk factors    Chronic HTN no     Type 1 or 2 Diabetes no     Renal Disease no     Autoimmune Disease  no          Contraindications to ASA therapy:  NSAID/ ASA allergy: no  Nasal polyps: no  Asthma with history of ASA induced bronchospasm: no    Relative contraindications:  History of GI bleed: no  Active peptic ulcer disease: no  Severe hepatic dysfunction: no    Patient does not meet recommendation to take ASA 162mg during this pregnancy from 12-36 wks to lower her risk of preeclampsia.       The patient has a history now or in prior pregnancy notable for:       Hypothyroidism  Hx anemia  Liver mass  Hx shingles (in previous pregnancy)  GERD  Carrier " CARLOS ALBERTO    Details that I feel the provider should be aware of: Jj and Ramone welcome their second child. Jj is overall feeling well. Pn1 labs ordered and reviewed. Patient is a carrier of CARLOS ALBERTO- partner Ramone is not a carrier. Tsh level ordered and reviewed. Blue folder reviewed.    PN1 visit scheduled. The patient was oriented to our practice, the navigator role, reviewed delivering physicians and [facility] for delivery. All questions were answered.    Interviewed by: Madeline Anders RN

## 2025-07-01 ENCOUNTER — APPOINTMENT (OUTPATIENT)
Dept: LAB | Facility: CLINIC | Age: 30
End: 2025-07-01
Payer: COMMERCIAL

## 2025-07-01 DIAGNOSIS — E03.9 HYPOTHYROIDISM, UNSPECIFIED TYPE: Primary | ICD-10-CM

## 2025-07-01 DIAGNOSIS — Z34.81 PRENATAL CARE, SUBSEQUENT PREGNANCY, FIRST TRIMESTER: ICD-10-CM

## 2025-07-01 DIAGNOSIS — Z3A.09 9 WEEKS GESTATION OF PREGNANCY: ICD-10-CM

## 2025-07-01 LAB
ABO GROUP BLD: NORMAL
BACTERIA UR QL AUTO: ABNORMAL /HPF
BASOPHILS # BLD AUTO: 0.02 THOUSANDS/ÂΜL (ref 0–0.1)
BASOPHILS NFR BLD AUTO: 0 % (ref 0–1)
BILIRUB UR QL STRIP: NEGATIVE
BLD GP AB SCN SERPL QL: NEGATIVE
CLARITY UR: ABNORMAL
COLOR UR: ABNORMAL
EOSINOPHIL # BLD AUTO: 0.03 THOUSAND/ÂΜL (ref 0–0.61)
EOSINOPHIL NFR BLD AUTO: 0 % (ref 0–6)
ERYTHROCYTE [DISTWIDTH] IN BLOOD BY AUTOMATED COUNT: 12.2 % (ref 11.6–15.1)
GLUCOSE UR STRIP-MCNC: NEGATIVE MG/DL
HCT VFR BLD AUTO: 39.2 % (ref 34.8–46.1)
HGB BLD-MCNC: 13.2 G/DL (ref 11.5–15.4)
HGB UR QL STRIP.AUTO: NEGATIVE
IMM GRANULOCYTES # BLD AUTO: 0.03 THOUSAND/UL (ref 0–0.2)
IMM GRANULOCYTES NFR BLD AUTO: 0 % (ref 0–2)
KETONES UR STRIP-MCNC: NEGATIVE MG/DL
LEUKOCYTE ESTERASE UR QL STRIP: NEGATIVE
LYMPHOCYTES # BLD AUTO: 1.39 THOUSANDS/ÂΜL (ref 0.6–4.47)
LYMPHOCYTES NFR BLD AUTO: 18 % (ref 14–44)
MCH RBC QN AUTO: 31.1 PG (ref 26.8–34.3)
MCHC RBC AUTO-ENTMCNC: 33.7 G/DL (ref 31.4–37.4)
MCV RBC AUTO: 92 FL (ref 82–98)
MONOCYTES # BLD AUTO: 0.38 THOUSAND/ÂΜL (ref 0.17–1.22)
MONOCYTES NFR BLD AUTO: 5 % (ref 4–12)
NEUTROPHILS # BLD AUTO: 5.84 THOUSANDS/ÂΜL (ref 1.85–7.62)
NEUTS SEG NFR BLD AUTO: 77 % (ref 43–75)
NITRITE UR QL STRIP: NEGATIVE
NON-SQ EPI CELLS URNS QL MICRO: ABNORMAL /HPF
NRBC BLD AUTO-RTO: 0 /100 WBCS
PH UR STRIP.AUTO: 6.5 [PH]
PLATELET # BLD AUTO: 245 THOUSANDS/UL (ref 149–390)
PMV BLD AUTO: 10.4 FL (ref 8.9–12.7)
PROT UR STRIP-MCNC: NEGATIVE MG/DL
RBC # BLD AUTO: 4.25 MILLION/UL (ref 3.81–5.12)
RBC #/AREA URNS AUTO: ABNORMAL /HPF
RH BLD: POSITIVE
RUBV IGG SERPL IA-ACNC: 26.9 IU/ML
SP GR UR STRIP.AUTO: 1.01 (ref 1–1.03)
T4 FREE SERPL-MCNC: 0.84 NG/DL (ref 0.61–1.12)
T4 SERPL-MCNC: 7.88 UG/DL (ref 6.09–12.23)
TSH SERPL DL<=0.05 MIU/L-ACNC: 1.21 UIU/ML (ref 0.45–4.5)
UROBILINOGEN UR STRIP-ACNC: <2 MG/DL
WBC # BLD AUTO: 7.69 THOUSAND/UL (ref 4.31–10.16)
WBC #/AREA URNS AUTO: ABNORMAL /HPF

## 2025-07-01 PROCEDURE — 86901 BLOOD TYPING SEROLOGIC RH(D): CPT

## 2025-07-01 PROCEDURE — 85025 COMPLETE CBC W/AUTO DIFF WBC: CPT

## 2025-07-01 PROCEDURE — 36415 COLL VENOUS BLD VENIPUNCTURE: CPT

## 2025-07-01 PROCEDURE — 86850 RBC ANTIBODY SCREEN: CPT

## 2025-07-01 PROCEDURE — 81001 URINALYSIS AUTO W/SCOPE: CPT

## 2025-07-01 PROCEDURE — 84436 ASSAY OF TOTAL THYROXINE: CPT

## 2025-07-01 PROCEDURE — 87389 HIV-1 AG W/HIV-1&-2 AB AG IA: CPT

## 2025-07-01 PROCEDURE — 87086 URINE CULTURE/COLONY COUNT: CPT

## 2025-07-01 PROCEDURE — 86762 RUBELLA ANTIBODY: CPT

## 2025-07-01 PROCEDURE — 86900 BLOOD TYPING SEROLOGIC ABO: CPT

## 2025-07-01 PROCEDURE — 84443 ASSAY THYROID STIM HORMONE: CPT

## 2025-07-01 PROCEDURE — 86803 HEPATITIS C AB TEST: CPT

## 2025-07-01 PROCEDURE — 86780 TREPONEMA PALLIDUM: CPT

## 2025-07-01 PROCEDURE — 84439 ASSAY OF FREE THYROXINE: CPT

## 2025-07-01 PROCEDURE — 86376 MICROSOMAL ANTIBODY EACH: CPT

## 2025-07-01 PROCEDURE — 87340 HEPATITIS B SURFACE AG IA: CPT

## 2025-07-01 PROCEDURE — 86800 THYROGLOBULIN ANTIBODY: CPT

## 2025-07-02 LAB
BACTERIA UR CULT: NORMAL
HBV SURFACE AG SER QL: NORMAL
HCV AB SER QL: NORMAL
HIV 1+2 AB+HIV1 P24 AG SERPL QL IA: NORMAL
THYROGLOB AB SERPL-ACNC: <1 IU/ML (ref 0–0.9)
THYROPEROXIDASE AB SERPL-ACNC: <9 IU/ML (ref 0–34)
TREPONEMA PALLIDUM IGG+IGM AB [PRESENCE] IN SERUM OR PLASMA BY IMMUNOASSAY: NORMAL

## 2025-07-10 ENCOUNTER — INITIAL PRENATAL (OUTPATIENT)
Age: 30
End: 2025-07-10

## 2025-07-10 VITALS
BODY MASS INDEX: 25.74 KG/M2 | SYSTOLIC BLOOD PRESSURE: 116 MMHG | HEIGHT: 67 IN | DIASTOLIC BLOOD PRESSURE: 70 MMHG | WEIGHT: 164 LBS

## 2025-07-10 DIAGNOSIS — Z36.89 CONFIRM FETAL CARDIAC ACTIVITY USING ULTRASOUND: ICD-10-CM

## 2025-07-10 DIAGNOSIS — Z3A.11 11 WEEKS GESTATION OF PREGNANCY: Primary | ICD-10-CM

## 2025-07-10 DIAGNOSIS — Z11.3 SCREEN FOR STD (SEXUALLY TRANSMITTED DISEASE): ICD-10-CM

## 2025-07-10 DIAGNOSIS — Z36.1 NEED FOR MATERNAL SERUM ALPHA-PROTEIN (MSAFP) SCREENING: ICD-10-CM

## 2025-07-10 DIAGNOSIS — Z34.01 SUPERVISION OF LOW-RISK FIRST PREGNANCY, FIRST TRIMESTER: ICD-10-CM

## 2025-07-10 PROCEDURE — 87591 N.GONORRHOEAE DNA AMP PROB: CPT | Performed by: OBSTETRICS & GYNECOLOGY

## 2025-07-10 PROCEDURE — PNV: Performed by: OBSTETRICS & GYNECOLOGY

## 2025-07-10 PROCEDURE — 87491 CHLMYD TRACH DNA AMP PROBE: CPT | Performed by: OBSTETRICS & GYNECOLOGY

## 2025-07-10 NOTE — PROGRESS NOTES
OB/GYN  PRENATAL H&P VISIT  Jj Meier  7/10/2025  9:52 AM  Dr. Stephanie Hall MD      SUBJECTIVE  Patient is here for initial PE.  OB history was reviewed.      Estimated Date of Delivery: 26  11w2d      Bleeding no  Cramping/Pelvic Pain occasional cramping  Abnormal Discharge no  Nausea occasional  Vomiting no    OB History    Para Term  AB Living   3 1 1 0 1 1   SAB IAB Ectopic Multiple Live Births   1 0 0 0 1      # Outcome Date GA Lbr Ubaldo/2nd Weight Sex Type Anes PTL Lv   3 Current            2 SAB 25 6w0d          1 Term 11/15/23 39w1d / 00:30 3275 g (7 lb 3.5 oz) F Vag-Spont EPI N TAMIKA      Name: ADDIS,BABY GIRL (JJ)      Apgar1: 6  Apgar5: 9      Obstetric Comments   Menarche, 12       Review of Systems   Constitutional: Negative.    HENT: Negative.     Eyes: Negative.    Respiratory: Negative.     Cardiovascular: Negative.    Gastrointestinal: Negative.    Endocrine: Negative.    Genitourinary:         As noted in HPI   Musculoskeletal: Negative.    Skin: Negative.    Allergic/Immunologic: Negative.    Neurological: Negative.    Hematological: Negative.    Psychiatric/Behavioral: Negative.         Past Medical History[1]    Past Surgical History[2]    Social History     Socioeconomic History    Marital status: /Civil Union     Spouse name: Not on file    Number of children: Not on file    Years of education: Not on file    Highest education level: Not on file   Occupational History    Occupation: EMPLOYED   Tobacco Use    Smoking status: Never    Smokeless tobacco: Never   Vaping Use    Vaping status: Never Used   Substance and Sexual Activity    Alcohol use: Not Currently     Comment: Social- prior to preg    Drug use: Never    Sexual activity: Yes     Partners: Male     Birth control/protection: None     Comment: TTC   Other Topics Concern    Not on file   Social History Narrative    EMPLOYED     Social Drivers of Health     Financial Resource  "Strain: Not on file   Food Insecurity: No Food Insecurity (6/30/2025)    Hunger Vital Sign     Worried About Running Out of Food in the Last Year: Never true     Ran Out of Food in the Last Year: Never true   Transportation Needs: No Transportation Needs (6/30/2025)    PRAPARE - Transportation     Lack of Transportation (Medical): No     Lack of Transportation (Non-Medical): No   Physical Activity: Not on file   Stress: Not on file   Social Connections: Unknown (6/18/2024)    Received from [employer]    Social [employer]     How often do you feel lonely or isolated from those around you? (Adult - for ages 18 years and over): Not on file   Intimate Partner Violence: Not on file   Housing Stability: Low Risk  (6/30/2025)    Housing Stability Vital Sign     Unable to Pay for Housing in the Last Year: No     Number of Times Moved in the Last Year: 0     Homeless in the Last Year: No       OBJECTIVE    /70 (BP Location: Right arm, Patient Position: Sitting, Cuff Size: Large)   Ht 5' 7\" (1.702 m)   Wt 74.4 kg (164 lb)   LMP 04/22/2025   BMI 25.69 kg/m²       Physical Exam  Constitutional:       Appearance: She is well-developed.   Genitourinary:      No vaginal discharge.        Right Adnexa: not tender and no mass present.     Left Adnexa: no mass present.     No cervical motion tenderness, lesion or polyp.      Uterus is enlarged.      Uterus is not tender.   Breasts:     Right: No mass, skin change or tenderness.      Left: No mass, skin change or tenderness.   HENT:      Head: Normocephalic.     Cardiovascular:      Rate and Rhythm: Normal rate.   Pulmonary:      Effort: Pulmonary effort is normal.   Abdominal:      General: There is no distension.      Palpations: Abdomen is soft.      Tenderness: There is no abdominal tenderness. There is no guarding.     Neurological:      Mental Status: She is alert and oriented to person, place, and time.     Skin:     General: Skin is warm and dry.     Psychiatric:    "      Behavior: Behavior normal.               ASSESSMENT AND PLAN    Problem List          Digestive    Focal nodular hyperplasia of liver       Behavioral Health    SALLIE (generalized anxiety disorder)       Obstetrics/Gynecology    Confirm fetal cardiac activity using ultrasound    11 weeks gestation of pregnancy       Other    Seasonal allergies    Family history of thromboembolic disease    Overview   Thrombophilia labs: negative         Family history of cardiovascular disease    CARLOS ALBERTO gene mutation positive     Other Visit Diagnoses         Screen for STD (sexually transmitted disease)          Supervision of low-risk first pregnancy, first trimester          Need for maternal serum alpha-protein (MSAFP) screening                       1. Pregnancy: H&P completed today. PN Labs reviewed today.     2. Screening: Pap smear 2025   GC/CT collected.     3. Genetic testing: Pt has appt with MFM, [id number] normal, AFP ordered    4. Immunizations: reviewed    5. Follow up: Return in 4 weeks.       Future Appointments   Date Time Provider Department Center   2025  8:00 AM  US 3 [facility]   2025  9:15 AM Stephanie Hall MD Complete  Practice-Wom   9/3/2025  2:45 PM Nova Jnae MD Complete  Practice-Wom   10/1/2025 10:45 AM Stephanie Hall MD Complete  Practice-Wom   2025  3:00 PM Stephanie Hall MD Complete  Practice-Wom   2026  2:30 PM MANUELA Kerr SURG ONC ALL Practice-Onc   3/16/2026  9:30 AM Stephanie Hall MD Complete  Practice-Wom         Stephanie Hall MD  9:52 AM         [1]   Past Medical History:  Diagnosis Date    Allergic     Seasonal    Anemia     iron deficiency    Asthma     exercise induced, inhalers not used, has effectively resolved with more exercise    GERD (gastroesophageal reflux disease)     Hypothyroid     Liver mass     focal nodular hyperplasia; 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    Shingles     previous pregnancy    Varicella     hx   [2]   Past Surgical History:  Procedure Laterality Date    KNEE SURGERY Left     excision of tumor-benign    LASIK      [place] TX MISSED  FIRST TRIMESTER SURGICAL N/A 2025    Procedure: (D&E) ( 6 WEEKS), EUA;  Surgeon: Stephanie Hall MD;  Location: [facility];  Service: Gynecology    TONSILLECTOMY  age 17    WISDOM TOOTH EXTRACTION

## 2025-07-13 LAB
C TRACH DNA SPEC QL NAA+PROBE: NEGATIVE
N GONORRHOEA DNA SPEC QL NAA+PROBE: NEGATIVE

## 2025-07-15 PROBLEM — Z3A.12 12 WEEKS GESTATION OF PREGNANCY: Status: ACTIVE | Noted: 2025-07-10

## 2025-07-21 ENCOUNTER — ROUTINE PRENATAL (OUTPATIENT)
Dept: PERINATAL CARE | Facility: OTHER | Age: 30
End: 2025-07-21
Attending: OBSTETRICS & GYNECOLOGY
Payer: COMMERCIAL

## 2025-07-21 VITALS
BODY MASS INDEX: 25.58 KG/M2 | WEIGHT: 163 LBS | DIASTOLIC BLOOD PRESSURE: 78 MMHG | HEART RATE: 82 BPM | SYSTOLIC BLOOD PRESSURE: 118 MMHG | HEIGHT: 67 IN

## 2025-07-21 DIAGNOSIS — Z82.49 FAMILY HISTORY OF THROMBOEMBOLIC DISEASE: ICD-10-CM

## 2025-07-21 DIAGNOSIS — Z33.1 NORMAL PREGNANCY, INCIDENTAL: ICD-10-CM

## 2025-07-21 DIAGNOSIS — Z3A.12 12 WEEKS GESTATION OF PREGNANCY: Primary | ICD-10-CM

## 2025-07-21 DIAGNOSIS — Z36.89 CONFIRM FETAL CARDIAC ACTIVITY USING ULTRASOUND: ICD-10-CM

## 2025-07-21 DIAGNOSIS — Z15.09 ATM GENE MUTATION POSITIVE: ICD-10-CM

## 2025-07-21 DIAGNOSIS — Z36.82 ENCOUNTER FOR ANTENATAL SCREENING FOR NUCHAL TRANSLUCENCY: ICD-10-CM

## 2025-07-21 DIAGNOSIS — Z15.01 ATM GENE MUTATION POSITIVE: ICD-10-CM

## 2025-07-21 DIAGNOSIS — Z82.49 FAMILY HISTORY OF CARDIAC DISORDER: ICD-10-CM

## 2025-07-21 PROCEDURE — 76813 OB US NUCHAL MEAS 1 GEST: CPT | Performed by: OBSTETRICS & GYNECOLOGY

## 2025-07-21 NOTE — ASSESSMENT & PLAN NOTE
She completed genetic counseling regarding this finding on 12/2/2024. Her , Ramone, was negative for this mutation. Please see genetic counseling note as well as surgical oncology note from 1/7/25 for additional information. No additional screening is warranted at this time.

## 2025-07-21 NOTE — ASSESSMENT & PLAN NOTE
Nips already resulted as low risk.  MSAFP screening is advised at 16-18 weeks.  A return visit in 7 weeks time is advised for detailed anatomic survey.

## 2025-07-21 NOTE — ASSESSMENT & PLAN NOTE
Jj's father had a DVT. She previously completed a thrombophilia panel in January 2023, which was negative. Those labs were reviewed prior to her visit today.

## 2025-07-21 NOTE — ASSESSMENT & PLAN NOTE
Jj already completed noninasive prenatal testing on 7/2, which resulted as low risk. She does not wish to pursue diagnostic testing at this time.   - MSAFP screening should be ordered through the OB office at 15-20 weeks gestation, and completed prior to fetal anatomic survey.    - A detailed anatomic survey as well as transvaginal cervical length screening are recommended between 20-22 weeks gestation.     Orders:    Ambulatory Referral to Maternal Fetal Medicine

## 2025-07-21 NOTE — LETTER
2025     Stephanie Hall MD  [address]  [address]  [place] [address]    Patient: Jj Meier   YOB: 1995   Date of Visit: 2025       Dear Dr. Stephanie Hall MD:    Thank you for referring Jj Meier to me for evaluation. Below are my notes for this consultation.    If you have questions, please do not hesitate to call me. I look forward to following your patient along with you.         Sincerely,        Zhanna Dela Cruz MD        CC: No Recipients    Zhanna Dela Cruz MD  2025  9:47 AM  Sign when Signing Visit  Consultation - Maternal Fetal Medicine   Jj Meier 30 y.o. MRN: [id number]  Encounter: [id number]    Assessment & Plan Jj is a 30 y.o. year-old  with EDITA of 2026, by Last Menstrual Period at 12w6d, here for consultation with ultrasound if applicable, with history notable for CARLOS ALBERTO mutation positive.  By issue:    Problem List Items Addressed This Visit          Obstetrics/Gynecology    Confirm fetal cardiac activity using ultrasound    12 weeks gestation of pregnancy - Primary    Jj already completed noninasive prenatal testing on , which resulted as low risk. She does not wish to pursue diagnostic testing at this time.   - MSAFP screening should be ordered through the OB office at 15-20 weeks gestation, and completed prior to fetal anatomic survey.    - A detailed anatomic survey as well as transvaginal cervical length screening are recommended between 20-22 weeks gestation.             Other    Family history of thromboembolic disease    Jj's father had a DVT. She previously completed a thrombophilia panel in 2023, which was negative. Those labs were reviewed prior to her visit today.          CARLOS ALBERTO gene mutation positive    She completed genetic counseling regarding this finding on 2024. Her , Ramone, was negative for this mutation. Please see genetic counseling note as well as surgical  "oncology note from 25 for additional information. No additional screening is warranted at this time.          Encounter for  screening for nuchal translucency    Nips already resulted as low risk.  MSAFP screening is advised at 16-18 weeks.  A return visit in 7 weeks time is advised for detailed anatomic survey.         Family history of cardiac disorder    Daughter Stephanie had PACs (in utero) and postnatally has a PFO.  Dr. Walker indicated at last exam \"the previously seen increased velocity into the left pulmonary artery causing a flow murmur has resolved and she also has a benign cardiac exam.\"  Will plan for fetal echocardiogram in this pregnancy.            History of Present Illness    Reason for consultation: consultation at the time of nuchal translucency    Referring physician:   Stephanie Hall Md  [address]  [address]  [place] [address]    Dear Dr. Hall,    Thank you for your kind referral of patient Jj Meier for [facility] consultation.  Jj is a 30 y.o. year-old  at 12w6d.  Past medical and past surgical histories were reviewed using an office screening tool and are notable for hypothyroidism.   Regarding her obstetric history, she delivered in 2023. She denies current use of alcohol, drugs of abuse, tobacco, and cannabis products.  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    Family history has been addressed previously.  Pregravid BMI was 24.90.    Detailed history is as follows:    OB History    Para Term  AB Living   3 1 1 0 1 1   SAB IAB Ectopic Multiple Live Births   1 0 0 0 1      # Outcome Date GA Lbr Ubaldo/2nd Weight Sex Type Anes PTL Lv   3 Current            2 SAB 25 6w0d          1 Term 11/15/23 39w1d / 00:30 3275 g (7 lb 3.5 oz) F Vag-Spont EPI N TAMIKA      Obstetric Comments   Menarche, 12     Past Medical History[1]      Past Surgical History[2]      Social History    Social " "History[3]   Family History[4]    Meds/Allergies  Current Medications[5]  Allergies   Allergen Reactions   • Levofloxacin Hives     Objective  Vitals: Blood pressure 118/78, pulse 82, height 5' 7\" (1.702 m), weight 73.9 kg (163 lb), last menstrual period 2025, not currently breastfeeding.Body mass index is 25.53 kg/m².  Physical Exam    Fetal data from today's visit: Report with images are contained in the ultrasound report located under Chart Review tab in Epic.       At the conclusion of today's encounter, all questions were answered to apparent patient satisfaction.  Please note that while the recent CURES Act permits medical records be visible for patient review, clinical documentation is intended for utilization by healthcare professionals, and also, if the patient reviews their own medical records, they may unintentionally review clinical information such as fetal sex. Thank you very much for this kind referral and please do not hesitate to contact me with any further questions or concerns.    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   • Shingles     previous pregnancy   • Varicella     hx   [2]   Past Surgical History:  Procedure Laterality Date   • KNEE SURGERY Left     excision of tumor-benign   • LASIK     • [place] TX MISSED  FIRST TRIMESTER SURGICAL N/A 2025    Procedure: (D&E) ( 6 WEEKS), EUA;  Surgeon: Stephanie Hall MD;  [facility];  Service: Gynecology   • TONSILLECTOMY  age 17   • " WISDOM TOOTH EXTRACTION     [3]   Social History  Tobacco Use   • Smoking status: Never   • Smokeless tobacco: Never   Vaping Use   • Vaping status: Never Used   Substance Use Topics   • Alcohol use: Not Currently     Comment: Social- prior to preg   • Drug use: Never   [4]   Family History  Problem Relation Name Age of Onset   • No Known Problems Mother     • Stroke Father Alex    • Coronary artery disease Father Alex    • Testicular cancer Father Alex    • Heart attack Father Alex         Multiple   • Royce's disease Father Alex    • Leukemia Father Alex         accute myeloid   • Cancer Father Alex         Testicular, chest mass, leukemia   • Deep vein thrombosis Father Alex         One occurrence approximately 1990?   • Heart disease Father Alex    • Prostate cancer Father Alex    • Clotting disorder Father Alex    • No Known Problems Sister     • No Known Problems Maternal Grandmother     • Lung cancer Paternal Grandmother Adela         non-smoker   • Bone cancer Paternal Grandmother Adela         Metastatic Lung Cancer-  Mets to Bone & Brain   • Ovarian cancer Paternal Grandmother Adela         Great grandmother - Adela's mom   • Brain cancer Paternal Grandmother Adela         Metastatic Lung Cancer-  Mets to Bone & Brain   • Cancer Paternal Grandmother Adela         Lung cancer (metastatic to brain & bone)   • Lupus Paternal Grandfather Roman    • Fanconi anemia Cousin     • Fanconi anemia Cousin     • Ovarian cancer Paternal Great-Grandmother     [5]   Current Outpatient Medications:   •  cholecalciferol (VITAMIN D3) 25 mcg (1,000 units) tablet, , Disp: , Rfl:   •  levothyroxine (Synthroid) 50 mcg tablet, , Disp: , Rfl:   •  Prenatal Vit-Fe Fumarate-FA (PRENATAL PO), Take by mouth, Disp: , Rfl:   •  EPINEPHrine (EPIPEN) 0.3 mg/0.3 mL SOAJ, Inject 0.3 mL (0.3 mg total) into a muscle once for 1 dose, Disp: 0.6 mL, Rfl: 2

## 2025-07-21 NOTE — ASSESSMENT & PLAN NOTE
"Daughter Stephanie had PACs (in utero) and postnatally has a PFO.  Dr. Walker indicated at last exam \"the previously seen increased velocity into the left pulmonary artery causing a flow murmur has resolved and she also has a benign cardiac exam.\"  Will plan for fetal echocardiogram in this pregnancy.  "

## 2025-07-21 NOTE — PROGRESS NOTES
"Consultation - Maternal Fetal Medicine   Jj Meier 30 y.o. MRN: [id number]  Encounter: [id number]    Assessment & Plan  Jj is a 30 y.o. year-old  with EDITA of 2026, by Last Menstrual Period at 12w6d, here for consultation with ultrasound if applicable, with history notable for CARLOS ALBERTO mutation positive.  By issue:    Problem List Items Addressed This Visit          Obstetrics/Gynecology    Confirm fetal cardiac activity using ultrasound    12 weeks gestation of pregnancy - Primary    Jj already completed noninasive prenatal testing on , which resulted as low risk. She does not wish to pursue diagnostic testing at this time.   - MSAFP screening should be ordered through the OB office at 15-20 weeks gestation, and completed prior to fetal anatomic survey.    - A detailed anatomic survey as well as transvaginal cervical length screening are recommended between 20-22 weeks gestation.             Other    Family history of thromboembolic disease    Jj's father had a DVT. She previously completed a thrombophilia panel in 2023, which was negative. Those labs were reviewed prior to her visit today.          CARLOS ALBERTO gene mutation positive    She completed genetic counseling regarding this finding on 2024. Her , Ramone, was negative for this mutation. Please see genetic counseling note as well as surgical oncology note from 25 for additional information. No additional screening is warranted at this time.          Encounter for  screening for nuchal translucency    Nips already resulted as low risk.  MSAFP screening is advised at 16-18 weeks.  A return visit in 7 weeks time is advised for detailed anatomic survey.         Family history of cardiac disorder    Daughter Stephanie had PACs (in utero) and postnatally has a PFO.  Dr. Walker indicated at last exam \"the previously seen increased velocity into the left pulmonary artery causing a flow murmur has resolved and she " "also has a benign cardiac exam.\"  Will plan for fetal echocardiogram in this pregnancy.            History of Present Illness     Reason for consultation: consultation at the time of nuchal translucency    Referring physician:   Stephanie Hall Md  [address]  [address]  [place] [address]    Dear Dr. Hall,    Thank you for your kind referral of patient Jj Meier for [facility] consultation.  Jj is a 30 y.o. year-old  at 12w6d.  Past medical and past surgical histories were reviewed using an office screening tool and are notable for hypothyroidism.   Regarding her obstetric history, she delivered in 2023. She denies current use of alcohol, drugs of abuse, tobacco, and cannabis products.  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    Family history has been addressed previously.  Pregravid BMI was 24.90.    Detailed history is as follows:    OB History    Para Term  AB Living   3 1 1 0 1 1   SAB IAB Ectopic Multiple Live Births   1 0 0 0 1      # Outcome Date GA Lbr Ubaldo/2nd Weight Sex Type Anes PTL Lv   3 Current            2 SAB 25 6w0d          1 Term 11/15/23 39w1d / 00:30 3275 g (7 lb 3.5 oz) F Vag-Spont EPI N TAMIKA      Obstetric Comments   Menarche, 12     Past Medical History[1]      Past Surgical History[2]      Social History     Social History[3]   Family History[4]    Meds/Allergies   Current Medications[5]  Allergies   Allergen Reactions    Levofloxacin Hives     Objective   Vitals: Blood pressure 118/78, pulse 82, height 5' 7\" (1.702 m), weight 73.9 kg (163 lb), last menstrual period 2025, not currently breastfeeding.Body mass index is 25.53 kg/m².  Physical Exam    Fetal data from today's visit: Report with images are contained in the ultrasound report located under Chart Review tab in Epic.       At the conclusion of today's encounter, all questions were answered to apparent patient satisfaction.  " Please note that while the recent CURES Act permits medical records be visible for patient review, clinical documentation is intended for utilization by healthcare professionals, and also, if the patient reviews their own medical records, they may unintentionally review clinical information such as fetal sex. Thank you very much for this kind referral and please do not hesitate to contact me with any further questions or concerns.    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    Shingles     previous pregnancy    Varicella     hx   [2]   Past Surgical History:  Procedure Laterality Date    KNEE SURGERY Left     excision of tumor-benign    LASIK      [place] TX MISSED  FIRST TRIMESTER SURGICAL N/A 2025    Procedure: (D&E) ( 6 WEEKS), EUA;  Surgeon: Stephanie Hall MD;  [facility];  Service: Gynecology    TONSILLECTOMY  age 17    WISDOM TOOTH EXTRACTION     [3]   Social History  Tobacco Use    Smoking status: Never    Smokeless tobacco: Never   Vaping Use    Vaping status: Never Used   Substance Use Topics    Alcohol use: Not Currently     Comment: Social- prior to preg    Drug use: Never   [4]   Family History  Problem Relation Name Age of Onset    No Known Problems Mother      Stroke Father Alex     Coronary artery disease Father Alex     Testicular cancer Father Alex     Heart attack Father Alex         Multiple    Hudspeth's disease Father Alex     Leukemia Father Alex         accute myeloid    Cancer Father Alex          Testicular, chest mass, leukemia    Deep vein thrombosis Father Alex         One occurrence approximately 1990?    Heart disease Father Alex     Prostate cancer Father Alex     Clotting disorder Father Alex     No Known Problems Sister      No Known Problems Maternal Grandmother      Lung cancer Paternal Grandmother Adela         non-smoker    Bone cancer Paternal Grandmother Adela         Metastatic Lung Cancer-  Mets to Bone & Brain    Ovarian cancer Paternal Grandmother Adela         Great grandmother - Adela's mom    Brain cancer Paternal Grandmother Adela         Metastatic Lung Cancer-  Mets to Bone & Brain    Cancer Paternal Grandmother Adela         Lung cancer (metastatic to brain & bone)    Lupus Paternal Grandfather Roman     Fanconi anemia Cousin      Fanconi anemia Cousin      Ovarian cancer Paternal Great-Grandmother     [5]   Current Outpatient Medications:     cholecalciferol (VITAMIN D3) 25 mcg (1,000 units) tablet, , Disp: , Rfl:     levothyroxine (Synthroid) 50 mcg tablet, , Disp: , Rfl:     Prenatal Vit-Fe Fumarate-FA (PRENATAL PO), Take by mouth, Disp: , Rfl:     EPINEPHrine (EPIPEN) 0.3 mg/0.3 mL SOAJ, Inject 0.3 mL (0.3 mg total) into a muscle once for 1 dose, Disp: 0.6 mL, Rfl: 2

## 2025-07-21 NOTE — ASSESSMENT & PLAN NOTE
Jj already completed noninasive prenatal testing on 7/2, which resulted as low risk. She does not wish to pursue diagnostic testing at this time.   - MSAFP screening should be ordered through the OB office at 15-20 weeks gestation, and completed prior to fetal anatomic survey.    - A detailed anatomic survey as well as transvaginal cervical length screening are recommended between 20-22 weeks gestation.

## 2025-08-06 ENCOUNTER — ROUTINE PRENATAL (OUTPATIENT)
Dept: OBGYN CLINIC | Facility: CLINIC | Age: 30
End: 2025-08-06

## 2025-08-06 VITALS
SYSTOLIC BLOOD PRESSURE: 120 MMHG | WEIGHT: 166 LBS | BODY MASS INDEX: 26.06 KG/M2 | HEIGHT: 67 IN | HEART RATE: 81 BPM | OXYGEN SATURATION: 99 % | DIASTOLIC BLOOD PRESSURE: 64 MMHG

## 2025-08-06 DIAGNOSIS — Z3A.15 15 WEEKS GESTATION OF PREGNANCY: Primary | ICD-10-CM

## 2025-08-06 DIAGNOSIS — Z34.82 ENCOUNTER FOR SUPERVISION OF NORMAL PREGNANCY IN MULTIGRAVIDA IN SECOND TRIMESTER: ICD-10-CM

## 2025-08-06 DIAGNOSIS — R00.2 PALPITATIONS: ICD-10-CM

## 2025-08-06 PROBLEM — Z36.82 ENCOUNTER FOR ANTENATAL SCREENING FOR NUCHAL TRANSLUCENCY: Status: RESOLVED | Noted: 2025-07-21 | Resolved: 2025-08-06

## 2025-08-06 PROBLEM — Z36.89 CONFIRM FETAL CARDIAC ACTIVITY USING ULTRASOUND: Status: RESOLVED | Noted: 2025-06-27 | Resolved: 2025-08-06

## 2025-08-06 PROCEDURE — PNV: Performed by: OBSTETRICS & GYNECOLOGY

## 2025-08-10 ENCOUNTER — OFFICE VISIT (OUTPATIENT)
Dept: LAB | Facility: HOSPITAL | Age: 30
End: 2025-08-10
Payer: COMMERCIAL

## 2025-08-10 ENCOUNTER — APPOINTMENT (OUTPATIENT)
Dept: LAB | Facility: HOSPITAL | Age: 30
End: 2025-08-10
Attending: OBSTETRICS & GYNECOLOGY
Payer: COMMERCIAL

## 2025-08-12 ENCOUNTER — DOCUMENTATION (OUTPATIENT)
Dept: CARDIOLOGY CLINIC | Facility: CLINIC | Age: 30
End: 2025-08-12

## 2025-08-12 ENCOUNTER — NURSE TRIAGE (OUTPATIENT)
Age: 30
End: 2025-08-12

## 2025-08-14 ENCOUNTER — HOSPITAL ENCOUNTER (OUTPATIENT)
Dept: NON INVASIVE DIAGNOSTICS | Facility: HOSPITAL | Age: 30
Discharge: HOME | End: 2025-08-14
Payer: COMMERCIAL

## 2025-08-19 ENCOUNTER — APPOINTMENT (OUTPATIENT)
Dept: LAB | Facility: CLINIC | Age: 30
End: 2025-08-19
Payer: COMMERCIAL

## 2025-08-19 PROCEDURE — 82105 ALPHA-FETOPROTEIN SERUM: CPT

## 2025-08-26 PROBLEM — R00.2 PALPITATIONS: Status: ACTIVE | Noted: 2025-08-26

## (undated) DEVICE — SCD SEQUENTIAL COMPRESSION COMFORT SLEEVE MEDIUM KNEE LENGTH: Brand: KENDALL SCD

## (undated) DEVICE — PREMIUM DRY TRAY LF: Brand: MEDLINE INDUSTRIES, INC.

## (undated) DEVICE — D + E SAFE TOUCH TISSUE TRAP (CIRCON)

## (undated) DEVICE — CURETTE VACURETTE CRVD 8MM

## (undated) DEVICE — D + E CONNECTION HOSE

## (undated) DEVICE — GLOVE PI ULTRA TOUCH SZ.6.5

## (undated) DEVICE — COLLECTION SET, DISPOSABLE WITH HANDLE AND TAPERED FITTINGS TUBING, 6 FT (183 CM) (10/PK): Brand: GYRUS ACMI

## (undated) DEVICE — DRAPE EQUIPMENT RF WAND

## (undated) DEVICE — BETHLEHEM UNIVERSAL MINOR VAG: Brand: CARDINAL HEALTH

## (undated) DEVICE — D + E SUCTION CANISTER

## (undated) DEVICE — GLOVE INDICATOR PI UNDERGLOVE SZ 6.5 BLUE

## (undated) DEVICE — PVC URETHRAL CATHETER: Brand: DOVER